# Patient Record
Sex: MALE | Race: WHITE | NOT HISPANIC OR LATINO | Employment: OTHER | URBAN - METROPOLITAN AREA
[De-identification: names, ages, dates, MRNs, and addresses within clinical notes are randomized per-mention and may not be internally consistent; named-entity substitution may affect disease eponyms.]

---

## 2017-08-03 ENCOUNTER — OFFICE VISIT (OUTPATIENT)
Dept: CARDIOLOGY | Facility: CLINIC | Age: 82
End: 2017-08-03

## 2017-08-03 VITALS
WEIGHT: 182 LBS | HEART RATE: 56 BPM | HEIGHT: 65 IN | SYSTOLIC BLOOD PRESSURE: 118 MMHG | DIASTOLIC BLOOD PRESSURE: 80 MMHG | OXYGEN SATURATION: 100 % | BODY MASS INDEX: 30.32 KG/M2

## 2017-08-03 DIAGNOSIS — Z95.5 HISTORY OF CORONARY ARTERY STENT PLACEMENT: ICD-10-CM

## 2017-08-03 DIAGNOSIS — I25.10 CORONARY ARTERY DISEASE INVOLVING NATIVE CORONARY ARTERY OF NATIVE HEART WITHOUT ANGINA PECTORIS: Primary | ICD-10-CM

## 2017-08-03 PROBLEM — E78.5 HLD (HYPERLIPIDEMIA): Status: ACTIVE | Noted: 2017-08-03

## 2017-08-03 PROBLEM — I71.9 AA (AORTIC ANEURYSM) (HCC): Status: ACTIVE | Noted: 2017-08-03

## 2017-08-03 PROCEDURE — 93000 ELECTROCARDIOGRAM COMPLETE: CPT | Performed by: PHYSICIAN ASSISTANT

## 2017-08-03 PROCEDURE — 99213 OFFICE O/P EST LOW 20 MIN: CPT | Performed by: PHYSICIAN ASSISTANT

## 2017-08-03 RX ORDER — LANOLIN ALCOHOL/MO/W.PET/CERES
1000 CREAM (GRAM) TOPICAL DAILY
COMMUNITY
End: 2020-01-01 | Stop reason: HOSPADM

## 2017-08-03 NOTE — PROGRESS NOTES
Date of Office Visit: 2017  Encounter Provider: AVTAR Bojorquez  Place of Service: [facility] CARDIOLOGY  Patient Name: Nicholas Lino  :1928    Chief Complaint   Patient presents with   • Coronary Artery Disease     1 year follow up   :     HPI: Nicholas Lino is a 88 y.o. male , new to me, who presents today for follow-up.  Old records have been obtained and reviewed by me.  He is a patient of Dr. Stewart's with a past medical history significant for hypertension, hyperlipidemia, and coronary artery disease.  In  he had 3 bare metal stents placed in his proximal LAD.  In  he came back with recurrent angina, and it was found that his first diagonal was severely compromised.  He underwent bare metal stent placement to this vessel as well.  At that time, his circumflex and RCA were free of disease and he had normal LV function.  He was last in our office to see Dr. Stewart on 2016.  At that visit, he was doing well without complaints of chest pain or heart failure.   Over the past year he's been doing great.  He really feels fantastic.  He walks on a treadmill 6 days a week, and on his off day he plays golf.  He is able to do all this without difficulty.  He still teaches watercolor classes regularly as well.  He denies any chest pain, shortness of breath, palpitations, edema, dizziness, or syncope.      Past Medical History:   Diagnosis Date   • Aortic aneurysm    • CAD (coronary artery disease)    • Hyperlipidemia    • Hypertension        Past Surgical History:   Procedure Laterality Date   • APPENDECTOMY     • ARTERIAL ANEURYSM REPAIR     • CARDIAC CATHETERIZATION     • CORONARY ANGIOPLASTY WITH STENT PLACEMENT     • COSMETIC SURGERY     • FEMORAL ARTERY REPAIR     • MOLE REMOVAL         Social History     Social History   • Marital status:      Spouse name: N/A   • Number of children: N/A   • Years of education: N/A     Occupational History   •  Not on file.     Social History Main Topics   • Smoking status: Never Smoker   • Smokeless tobacco: Not on file   • Alcohol use No   • Drug use: No   • Sexual activity: Defer     Other Topics Concern   • Not on file     Social History Narrative       Family History   Problem Relation Age of Onset   • Heart disease Father    • Stroke Father    • No Known Problems Mother    • No Known Problems Maternal Grandmother    • No Known Problems Maternal Grandfather    • No Known Problems Paternal Grandmother    • No Known Problems Paternal Grandfather        Review of Systems   Constitution: Negative for chills, fever, malaise/fatigue, weight gain and weight loss.   HENT: Negative for ear pain, headaches, hearing loss, nosebleeds and sore throat.    Eyes: Negative for double vision, pain and visual disturbance.   Cardiovascular: Negative for chest pain, dyspnea on exertion, irregular heartbeat, leg swelling, near-syncope, orthopnea, palpitations, paroxysmal nocturnal dyspnea and syncope.   Respiratory: Negative for cough, shortness of breath, sleep disturbances due to breathing, snoring and wheezing.    Endocrine: Negative for cold intolerance, heat intolerance and polyuria.   Skin: Negative for itching and rash.   Musculoskeletal: Negative for joint pain, joint swelling and myalgias.   Gastrointestinal: Negative for abdominal pain, diarrhea, melena, nausea and vomiting.   Genitourinary: Negative for frequency, hematuria and hesitancy.   Neurological: Negative for excessive daytime sleepiness, light-headedness, numbness, paresthesias and seizures.   Psychiatric/Behavioral: Negative for altered mental status and depression.   Allergic/Immunologic: Negative.    All other systems reviewed and are negative.      Allergies   Allergen Reactions   • Clindamycin/Lincomycin    • Keflex [Cephalexin]          Current Outpatient Prescriptions:   •  aspirin 81 MG chewable tablet, Chew 81 mg daily., Disp: , Rfl:   •  calcium  "citrate-vitamin d (CITRACAL) 200-250 MG-UNIT tablet tablet, Take 1 tablet by mouth Daily., Disp: , Rfl:   •  hydrochlorothiazide (MICROZIDE) 12.5 MG capsule, TAKE ONE CAPSULE BY MOUTH DAILY, Disp: 90 capsule, Rfl: 3  •  lisinopril (PRINIVIL,ZESTRIL) 40 MG tablet, Take 40 mg by mouth daily., Disp: , Rfl:   •  metoprolol tartrate (LOPRESSOR) 25 MG tablet, Take 25 mg by mouth 2 (two) times a day., Disp: , Rfl:   •  MULTIPLE VITAMINS PO, Take 1 tablet by mouth Daily., Disp: , Rfl:   •  Potassium 99 MG tablet, Take 1 tablet by mouth Daily., Disp: , Rfl:   •  simvastatin (ZOCOR) 80 MG tablet, Take 80 mg by mouth every night., Disp: , Rfl:   •  vitamin B-12 (CYANOCOBALAMIN) 1000 MCG tablet, Take 1,000 mcg by mouth Daily., Disp: , Rfl:   •  vitamin C (ASCORBIC ACID) 250 MG tablet, Take 1,000 mg by mouth daily., Disp: , Rfl:      Objective:     Vitals:    08/03/17 1030 08/03/17 1042   BP: 110/70 118/80   BP Location: Right arm Left arm   Pulse: 56    SpO2: 100%    Weight: 182 lb (82.6 kg)    Height: 65\" (165.1 cm)      Body mass index is 30.29 kg/(m^2).    PHYSICAL EXAM:    Physical Exam   Constitutional: He is oriented to person, place, and time. He appears well-developed and well-nourished. No distress.   HENT:   Head: Normocephalic and atraumatic.   Eyes: Pupils are equal, round, and reactive to light.   Neck: No JVD present. No thyromegaly present.   Cardiovascular: Normal rate, regular rhythm, normal heart sounds and intact distal pulses.    No murmur heard.  Pulmonary/Chest: Effort normal and breath sounds normal. No respiratory distress.   Abdominal: Soft. Bowel sounds are normal. He exhibits no distension. There is no splenomegaly or hepatomegaly. There is no tenderness.   Musculoskeletal: Normal range of motion. He exhibits no edema.   Neurological: He is alert and oriented to person, place, and time.   Skin: Skin is warm and dry. He is not diaphoretic. No erythema.   Psychiatric: He has a normal mood and affect. " His behavior is normal. Judgment normal.         ECG 12 Lead  Date/Time: 8/3/2017 10:49 AM  Performed by: OVIDIO RASHEED.  Authorized by: OVIDIO RASHEED   Comparison: compared with previous ECG from 7/25/2016  Similar to previous ECG  Rhythm: sinus rhythm  BPM: 56  Conduction: 1st degree  Q waves: III, aVR and V1  Clinical impression: abnormal ECG  Comments: Indication: Coronary artery disease, status post stent placement.              Assessment:       Diagnosis Plan   1. Coronary artery disease involving native coronary artery of native heart without angina pectoris  ECG 12 Lead   2. History of coronary artery stent placement  ECG 12 Lead     Orders Placed This Encounter   Procedures   • ECG 12 Lead     This order was created via procedure documentation          Plan:       1.  Coronary Artery Disease  Assessment  • The patient has no angina    Plan  • Lifestyle modifications discussed include adhering to a heart healthy diet and regular exercise    Subjective - Objective  • There has been a previous stent procedure using BMS  • Current antiplatelet therapy includes aspirin 81 mg  • He is doing really fantastic.  This is a man who is 88 years old and is truly enjoying life and living it to the fullest.  I'm not going to make any changes to his medical regimen today.  He will follow-up with Dr. Stewart in 1 year or sooner if needed.      As always, it has been a pleasure to participate in your patient's care.      Sincerely,         Ovidio Rasheed PA-C

## 2017-08-14 ENCOUNTER — TRANSCRIBE ORDERS (OUTPATIENT)
Dept: CARDIOLOGY | Facility: HOSPITAL | Age: 82
End: 2017-08-14

## 2017-08-14 ENCOUNTER — HOSPITAL ENCOUNTER (OUTPATIENT)
Dept: GENERAL RADIOLOGY | Facility: HOSPITAL | Age: 82
Discharge: HOME | End: 2017-08-14
Attending: SURGERY | Admitting: SURGERY

## 2017-08-14 DIAGNOSIS — Z95.1 S/P 2-VESSEL CORONARY ARTERY BYPASS: Primary | ICD-10-CM

## 2017-08-14 DIAGNOSIS — Z95.1 S/P 2-VESSEL CORONARY ARTERY BYPASS: ICD-10-CM

## 2017-08-14 DIAGNOSIS — I71.40 ABDOMINAL AORTIC ANEURYSM (AAA) WITHOUT RUPTURE (HCC): ICD-10-CM

## 2017-08-14 PROCEDURE — 74010 HC ABDOMEN SERIES FOR ANEURYSM: CPT

## 2017-08-21 RX ORDER — HYDROCHLOROTHIAZIDE 12.5 MG/1
CAPSULE, GELATIN COATED ORAL
Qty: 90 CAPSULE | Refills: 2 | Status: SHIPPED | OUTPATIENT
Start: 2017-08-21 | End: 2018-05-15 | Stop reason: SDUPTHER

## 2018-05-15 RX ORDER — HYDROCHLOROTHIAZIDE 12.5 MG/1
12.5 CAPSULE, GELATIN COATED ORAL DAILY
Qty: 90 CAPSULE | Refills: 0 | Status: SHIPPED | OUTPATIENT
Start: 2018-05-15 | End: 2018-08-11 | Stop reason: SDUPTHER

## 2018-08-06 ENCOUNTER — OFFICE VISIT (OUTPATIENT)
Dept: CARDIOLOGY | Facility: CLINIC | Age: 83
End: 2018-08-06

## 2018-08-06 VITALS
HEART RATE: 56 BPM | SYSTOLIC BLOOD PRESSURE: 110 MMHG | BODY MASS INDEX: 29.99 KG/M2 | DIASTOLIC BLOOD PRESSURE: 62 MMHG | WEIGHT: 180 LBS | HEIGHT: 65 IN

## 2018-08-06 DIAGNOSIS — E78.49 OTHER HYPERLIPIDEMIA: ICD-10-CM

## 2018-08-06 DIAGNOSIS — Z95.5 HISTORY OF CORONARY ARTERY STENT PLACEMENT: ICD-10-CM

## 2018-08-06 DIAGNOSIS — I25.10 CORONARY ARTERY DISEASE INVOLVING NATIVE CORONARY ARTERY OF NATIVE HEART WITHOUT ANGINA PECTORIS: Primary | ICD-10-CM

## 2018-08-06 PROCEDURE — 99214 OFFICE O/P EST MOD 30 MIN: CPT | Performed by: INTERNAL MEDICINE

## 2018-08-06 PROCEDURE — 93000 ELECTROCARDIOGRAM COMPLETE: CPT | Performed by: INTERNAL MEDICINE

## 2018-08-06 NOTE — PROGRESS NOTES
Date of Office Visit: 2018  Encounter Provider: Kieran Stewart MD  Place of Service: [facility] CARDIOLOGY  Patient Name: Nicholas Lino  :1928  [id number]    CC:CAD    HPI: Nicholas Lino is a 89 y.o. male  He is here for follow-up.  He is a suleman that I have only done this to one time before.  I put three bare-metal stents in his proximal left anterior descending and into his first diagonal in  and , so he has a bare-metal stent bifurcation stent and he has done fantastic ever since then.  He has never had any more problems since then.  He had a normal circumflex and right coronary artery and normal left ventricular function.  He is a watercolor .  He still plays golf and he walks 20 minutes every day on a treadmill, all without limitation and he is doing well.         Past Medical History:   Diagnosis Date   • Aortic aneurysm (CMS/HCC)    • CAD (coronary artery disease)    • Hyperlipidemia    • Hypertension        Past Surgical History:   Procedure Laterality Date   • APPENDECTOMY     • ARTERIAL ANEURYSM REPAIR     • CARDIAC CATHETERIZATION     • CORONARY ANGIOPLASTY WITH STENT PLACEMENT     • COSMETIC SURGERY     • FEMORAL ARTERY REPAIR     • MOLE REMOVAL         Social History     Social History   • Marital status:      Spouse name: N/A   • Number of children: N/A   • Years of education: N/A     Occupational History   • Not on file.     Social History Main Topics   • Smoking status: Never Smoker   • Smokeless tobacco: Not on file   • Alcohol use No   • Drug use: No   • Sexual activity: Defer     Other Topics Concern   • Not on file     Social History Narrative   • No narrative on file       Family History   Problem Relation Age of Onset   • Heart disease Father    • Stroke Father    • No Known Problems Mother    • No Known Problems Maternal Grandmother    • No Known Problems Maternal Grandfather    • No Known Problems Paternal Grandmother    • No  Known Problems Paternal Grandfather        Review of Systems   Constitution: Negative for decreased appetite, fever, malaise/fatigue and weight loss.   HENT: Negative for nosebleeds.    Eyes: Negative for double vision.   Cardiovascular: Negative for chest pain, claudication, cyanosis, dyspnea on exertion, irregular heartbeat, leg swelling, near-syncope, orthopnea, palpitations, paroxysmal nocturnal dyspnea and syncope.   Respiratory: Negative for cough, hemoptysis and shortness of breath.    Hematologic/Lymphatic: Negative for bleeding problem.   Skin: Negative for rash.   Musculoskeletal: Negative for falls and myalgias.   Gastrointestinal: Negative for hematochezia, jaundice, melena, nausea and vomiting.   Genitourinary: Negative for hematuria.   Neurological: Negative for dizziness and seizures.   Psychiatric/Behavioral: Negative for altered mental status and memory loss.       Allergies   Allergen Reactions   • Clindamycin/Lincomycin    • Keflex [Cephalexin]          Current Outpatient Prescriptions:   •  aspirin 81 MG chewable tablet, Chew 81 mg daily., Disp: , Rfl:   •  calcium citrate-vitamin d (CITRACAL) 200-250 MG-UNIT tablet tablet, Take 1 tablet by mouth Daily., Disp: , Rfl:   •  hydrochlorothiazide (MICROZIDE) 12.5 MG capsule, Take 1 capsule by mouth Daily., Disp: 90 capsule, Rfl: 0  •  lisinopril (PRINIVIL,ZESTRIL) 40 MG tablet, Take 40 mg by mouth daily., Disp: , Rfl:   •  metoprolol tartrate (LOPRESSOR) 25 MG tablet, Take 25 mg by mouth 2 (two) times a day., Disp: , Rfl:   •  MULTIPLE VITAMINS PO, Take 1 tablet by mouth Daily., Disp: , Rfl:   •  Potassium 99 MG tablet, Take 1 tablet by mouth Daily., Disp: , Rfl:   •  simvastatin (ZOCOR) 80 MG tablet, Take 80 mg by mouth every night., Disp: , Rfl:   •  vitamin B-12 (CYANOCOBALAMIN) 1000 MCG tablet, Take 1,000 mcg by mouth Daily., Disp: , Rfl:   •  vitamin C (ASCORBIC ACID) 250 MG tablet, Take 1,000 mg by mouth daily., Disp: , Rfl:       Objective:  "    Vitals:    08/06/18 1258   BP: 110/62   Pulse: 56   Weight: 81.6 kg (180 lb)   Height: 165.1 cm (65\")     Body mass index is 29.95 kg/m².    Physical Exam   Constitutional: He is oriented to person, place, and time. He appears well-developed and well-nourished.   HENT:   Head: Normocephalic.   Eyes: No scleral icterus.   Neck: No JVD present. No thyromegaly present.   Cardiovascular: Normal rate, regular rhythm and normal heart sounds.  Exam reveals no gallop and no friction rub.    No murmur heard.  Pulmonary/Chest: Effort normal and breath sounds normal. He has no wheezes. He has no rales.   Abdominal: Soft. There is no hepatosplenomegaly. There is no tenderness.   Musculoskeletal: Normal range of motion. He exhibits no edema.   Lymphadenopathy:     He has no cervical adenopathy.   Neurological: He is alert and oriented to person, place, and time.   Skin: Skin is warm and dry. No rash noted.   Psychiatric: He has a normal mood and affect.         ECG 12 Lead  Date/Time: 8/6/2018 1:35 PM  Performed by: QIAN GUERRA  Authorized by: QIAN GUERRA   Comparison: compared with previous ECG   Similar to previous ECG  Rhythm: sinus bradycardia  Conduction: 1st degree  Clinical impression: normal ECG             Assessment:       Diagnosis Plan   1. Coronary artery disease involving native coronary artery of native heart without angina pectoris     2. History of coronary artery stent placement     3. Other hyperlipidemia            Plan:       He is doing great.  He has no symptoms.  Risk modification is fantastic.  He is 88, so this has been a win.  I am not going to make any changes.  I will have him come back and see us in a year or sooner if he has trouble.    Coronary Artery Disease  Assessment  • The patient has no angina    Plan  • Lifestyle modifications discussed include adhering to a heart healthy diet, maintenance of a healthy weight, medication compliance, regular exercise and regular monitoring of " cholesterol and blood pressure    Subjective - Objective  • There has been a previous stent procedure using BMS  • Current antiplatelet therapy includes aspirin 81 mg        As always, it has been a pleasure to participate in your patient's care.      Sincerely,       Kieran Stewart MD

## 2018-08-13 RX ORDER — HYDROCHLOROTHIAZIDE 12.5 MG/1
CAPSULE, GELATIN COATED ORAL
Qty: 90 CAPSULE | Refills: 2 | Status: SHIPPED | OUTPATIENT
Start: 2018-08-13 | End: 2019-01-01 | Stop reason: SDUPTHER

## 2019-01-01 ENCOUNTER — HOSPITAL ENCOUNTER (OUTPATIENT)
Dept: PHYSICAL THERAPY | Facility: HOSPITAL | Age: 84
Setting detail: THERAPIES SERIES
Discharge: HOME | End: 2019-10-03

## 2019-01-01 ENCOUNTER — TRANSCRIBE ORDERS (OUTPATIENT)
Dept: ADMINISTRATIVE | Facility: HOSPITAL | Age: 84
End: 2019-01-01

## 2019-01-01 ENCOUNTER — APPOINTMENT (OUTPATIENT)
Dept: PREADMISSION TESTING | Facility: HOSPITAL | Age: 84
End: 2019-01-01

## 2019-01-01 ENCOUNTER — TRANSCRIBE ORDERS (OUTPATIENT)
Dept: PHYSICAL THERAPY | Facility: HOSPITAL | Age: 84
End: 2019-01-01

## 2019-01-01 ENCOUNTER — HOSPITAL ENCOUNTER (OUTPATIENT)
Dept: PHYSICAL THERAPY | Facility: HOSPITAL | Age: 84
Setting detail: THERAPIES SERIES
Discharge: HOME | End: 2019-10-24

## 2019-01-01 ENCOUNTER — HOSPITAL ENCOUNTER (OUTPATIENT)
Dept: PHYSICAL THERAPY | Facility: HOSPITAL | Age: 84
Setting detail: THERAPIES SERIES
Discharge: HOME | End: 2019-10-29

## 2019-01-01 ENCOUNTER — HOSPITAL ENCOUNTER (OUTPATIENT)
Dept: CARDIOLOGY | Facility: HOSPITAL | Age: 84
Discharge: HOME | End: 2019-11-26
Admitting: INTERNAL MEDICINE

## 2019-01-01 ENCOUNTER — HOSPITAL ENCOUNTER (OUTPATIENT)
Dept: PHYSICAL THERAPY | Facility: HOSPITAL | Age: 84
Setting detail: THERAPIES SERIES
Discharge: HOME | End: 2019-10-01

## 2019-01-01 ENCOUNTER — OFFICE VISIT (OUTPATIENT)
Dept: CARDIOLOGY | Facility: CLINIC | Age: 84
End: 2019-01-01

## 2019-01-01 ENCOUNTER — HOSPITAL ENCOUNTER (OUTPATIENT)
Dept: PHYSICAL THERAPY | Facility: HOSPITAL | Age: 84
Setting detail: THERAPIES SERIES
Discharge: HOME | End: 2019-10-22

## 2019-01-01 ENCOUNTER — TELEPHONE (OUTPATIENT)
Dept: PHARMACY | Facility: HOSPITAL | Age: 84
End: 2019-01-01

## 2019-01-01 ENCOUNTER — HOSPITAL ENCOUNTER (OUTPATIENT)
Dept: PHYSICAL THERAPY | Facility: HOSPITAL | Age: 84
Setting detail: THERAPIES SERIES
Discharge: HOME | End: 2019-10-17

## 2019-01-01 ENCOUNTER — HOSPITAL ENCOUNTER (OUTPATIENT)
Dept: CARDIOLOGY | Facility: HOSPITAL | Age: 84
Discharge: HOME | End: 2019-08-19
Admitting: INTERNAL MEDICINE

## 2019-01-01 ENCOUNTER — HOSPITAL ENCOUNTER (OUTPATIENT)
Dept: PHYSICAL THERAPY | Facility: HOSPITAL | Age: 84
Setting detail: THERAPIES SERIES
Discharge: HOME | End: 2019-10-10

## 2019-01-01 ENCOUNTER — HOSPITAL ENCOUNTER (OUTPATIENT)
Dept: PHYSICAL THERAPY | Facility: HOSPITAL | Age: 84
Setting detail: THERAPIES SERIES
Discharge: HOME | End: 2019-10-08

## 2019-01-01 ENCOUNTER — HOSPITAL ENCOUNTER (OUTPATIENT)
Dept: GENERAL RADIOLOGY | Facility: HOSPITAL | Age: 84
Discharge: HOME | End: 2019-08-19

## 2019-01-01 ENCOUNTER — TELEPHONE (OUTPATIENT)
Dept: CARDIOLOGY | Facility: CLINIC | Age: 84
End: 2019-01-01

## 2019-01-01 ENCOUNTER — HOSPITAL ENCOUNTER (OUTPATIENT)
Dept: PHYSICAL THERAPY | Facility: HOSPITAL | Age: 84
Setting detail: THERAPIES SERIES
Discharge: HOME | End: 2019-09-19

## 2019-01-01 ENCOUNTER — OFFICE VISIT (OUTPATIENT)
Dept: ORTHOPEDIC SURGERY | Facility: CLINIC | Age: 84
End: 2019-01-01

## 2019-01-01 ENCOUNTER — HOSPITAL ENCOUNTER (OUTPATIENT)
Dept: GENERAL RADIOLOGY | Facility: HOSPITAL | Age: 84
Discharge: HOME | End: 2019-02-18
Admitting: SURGERY

## 2019-01-01 ENCOUNTER — HOSPITAL ENCOUNTER (OUTPATIENT)
Dept: PHYSICAL THERAPY | Facility: HOSPITAL | Age: 84
Setting detail: THERAPIES SERIES
Discharge: HOME | End: 2019-10-15

## 2019-01-01 VITALS
DIASTOLIC BLOOD PRESSURE: 70 MMHG | HEART RATE: 65 BPM | OXYGEN SATURATION: 95 % | SYSTOLIC BLOOD PRESSURE: 128 MMHG | BODY MASS INDEX: 28.45 KG/M2 | WEIGHT: 177 LBS | HEIGHT: 66 IN

## 2019-01-01 VITALS
WEIGHT: 175.2 LBS | BODY MASS INDEX: 28.16 KG/M2 | HEIGHT: 66 IN | DIASTOLIC BLOOD PRESSURE: 62 MMHG | TEMPERATURE: 98.6 F | SYSTOLIC BLOOD PRESSURE: 130 MMHG

## 2019-01-01 VITALS — HEIGHT: 68 IN | WEIGHT: 169 LBS | BODY MASS INDEX: 25.61 KG/M2

## 2019-01-01 VITALS
HEART RATE: 48 BPM | WEIGHT: 175 LBS | SYSTOLIC BLOOD PRESSURE: 146 MMHG | BODY MASS INDEX: 28.12 KG/M2 | HEIGHT: 66 IN | OXYGEN SATURATION: 100 % | TEMPERATURE: 97.6 F | DIASTOLIC BLOOD PRESSURE: 78 MMHG | RESPIRATION RATE: 20 BRPM

## 2019-01-01 VITALS — WEIGHT: 177.2 LBS | HEIGHT: 67 IN | BODY MASS INDEX: 27.81 KG/M2 | TEMPERATURE: 97.8 F

## 2019-01-01 DIAGNOSIS — Z86.79 STATUS POST AAA (ABDOMINAL AORTIC ANEURYSM) REPAIR: Primary | ICD-10-CM

## 2019-01-01 DIAGNOSIS — M79.661 PAIN AND SWELLING OF RIGHT LOWER LEG: Primary | ICD-10-CM

## 2019-01-01 DIAGNOSIS — M16.11 PRIMARY OSTEOARTHRITIS OF RIGHT HIP: ICD-10-CM

## 2019-01-01 DIAGNOSIS — M79.604 RIGHT LEG PAIN: ICD-10-CM

## 2019-01-01 DIAGNOSIS — M25.551 RIGHT HIP PAIN: Primary | ICD-10-CM

## 2019-01-01 DIAGNOSIS — I25.10 CORONARY ARTERY DISEASE INVOLVING NATIVE CORONARY ARTERY OF NATIVE HEART WITHOUT ANGINA PECTORIS: Primary | ICD-10-CM

## 2019-01-01 DIAGNOSIS — M16.11 PRIMARY OSTEOARTHRITIS OF RIGHT HIP: Primary | ICD-10-CM

## 2019-01-01 DIAGNOSIS — M79.89 PAIN AND SWELLING OF RIGHT LOWER LEG: ICD-10-CM

## 2019-01-01 DIAGNOSIS — I25.10 CHRONIC CORONARY ARTERY DISEASE: Primary | ICD-10-CM

## 2019-01-01 DIAGNOSIS — M79.89 PAIN AND SWELLING OF RIGHT LOWER LEG: Primary | ICD-10-CM

## 2019-01-01 DIAGNOSIS — Z98.890 STATUS POST AAA (ABDOMINAL AORTIC ANEURYSM) REPAIR: Primary | ICD-10-CM

## 2019-01-01 DIAGNOSIS — Z98.890 STATUS POST AAA (ABDOMINAL AORTIC ANEURYSM) REPAIR: ICD-10-CM

## 2019-01-01 DIAGNOSIS — M54.31 RIGHT SIDED SCIATICA: Primary | ICD-10-CM

## 2019-01-01 DIAGNOSIS — I10 ESSENTIAL HYPERTENSION: ICD-10-CM

## 2019-01-01 DIAGNOSIS — Z86.79 STATUS POST AAA (ABDOMINAL AORTIC ANEURYSM) REPAIR: ICD-10-CM

## 2019-01-01 DIAGNOSIS — I25.10 CHRONIC CORONARY ARTERY DISEASE: ICD-10-CM

## 2019-01-01 DIAGNOSIS — M79.661 PAIN AND SWELLING OF RIGHT LOWER LEG: ICD-10-CM

## 2019-01-01 LAB
ANION GAP SERPL CALCULATED.3IONS-SCNC: 10.9 MMOL/L (ref 5–15)
BACTERIA UR QL AUTO: ABNORMAL /HPF
BH CV LOWER VASCULAR LEFT COMMON FEMORAL AUGMENT: NORMAL
BH CV LOWER VASCULAR LEFT COMMON FEMORAL COMPETENT: NORMAL
BH CV LOWER VASCULAR LEFT COMMON FEMORAL COMPRESS: NORMAL
BH CV LOWER VASCULAR LEFT COMMON FEMORAL PHASIC: NORMAL
BH CV LOWER VASCULAR LEFT COMMON FEMORAL SPONT: NORMAL
BH CV LOWER VASCULAR RIGHT COMMON FEMORAL AUGMENT: NORMAL
BH CV LOWER VASCULAR RIGHT COMMON FEMORAL COMPETENT: NORMAL
BH CV LOWER VASCULAR RIGHT COMMON FEMORAL COMPRESS: NORMAL
BH CV LOWER VASCULAR RIGHT COMMON FEMORAL PHASIC: NORMAL
BH CV LOWER VASCULAR RIGHT COMMON FEMORAL SPONT: NORMAL
BH CV LOWER VASCULAR RIGHT DISTAL FEMORAL COMPRESS: NORMAL
BH CV LOWER VASCULAR RIGHT GASTRONEMIUS COMPRESS: NORMAL
BH CV LOWER VASCULAR RIGHT GREATER SAPH AK COMPRESS: NORMAL
BH CV LOWER VASCULAR RIGHT GREATER SAPH BK COMPRESS: NORMAL
BH CV LOWER VASCULAR RIGHT MID FEMORAL AUGMENT: NORMAL
BH CV LOWER VASCULAR RIGHT MID FEMORAL COMPETENT: NORMAL
BH CV LOWER VASCULAR RIGHT MID FEMORAL COMPRESS: NORMAL
BH CV LOWER VASCULAR RIGHT MID FEMORAL PHASIC: NORMAL
BH CV LOWER VASCULAR RIGHT MID FEMORAL SPONT: NORMAL
BH CV LOWER VASCULAR RIGHT PERONEAL COMPRESS: NORMAL
BH CV LOWER VASCULAR RIGHT POPLITEAL AUGMENT: NORMAL
BH CV LOWER VASCULAR RIGHT POPLITEAL COMPETENT: NORMAL
BH CV LOWER VASCULAR RIGHT POPLITEAL COMPRESS: NORMAL
BH CV LOWER VASCULAR RIGHT POPLITEAL PHASIC: NORMAL
BH CV LOWER VASCULAR RIGHT POPLITEAL SPONT: NORMAL
BH CV LOWER VASCULAR RIGHT POSTERIOR TIBIAL COMPRESS: NORMAL
BH CV LOWER VASCULAR RIGHT PROXIMAL FEMORAL COMPRESS: NORMAL
BH CV LOWER VASCULAR RIGHT SAPHENOFEMORAL JUNCTION COMPRESS: NORMAL
BH CV NUCLEAR PRIOR STUDY: 3
BH CV STRESS BP STAGE 1: NORMAL
BH CV STRESS COMMENTS STAGE 1: NORMAL
BH CV STRESS DOSE REGADENOSON STAGE 1: 0.4
BH CV STRESS DURATION MIN STAGE 1: 0
BH CV STRESS DURATION SEC STAGE 1: 10
BH CV STRESS HR STAGE 1: 62
BH CV STRESS PROTOCOL 1: NORMAL
BH CV STRESS RECOVERY BP: NORMAL MMHG
BH CV STRESS RECOVERY HR: 62 BPM
BH CV STRESS STAGE 1: 1
BILIRUB UR QL STRIP: NEGATIVE
BUN BLD-MCNC: 14 MG/DL (ref 8–23)
BUN/CREAT SERPL: 14.9 (ref 7–25)
CALCIUM SPEC-SCNC: 10.3 MG/DL (ref 8.2–9.6)
CHLORIDE SERPL-SCNC: 99 MMOL/L (ref 98–107)
CLARITY UR: CLEAR
CO2 SERPL-SCNC: 30.1 MMOL/L (ref 22–29)
COLOR UR: YELLOW
CREAT BLD-MCNC: 0.94 MG/DL (ref 0.76–1.27)
DEPRECATED RDW RBC AUTO: 43.3 FL (ref 37–54)
ERYTHROCYTE [DISTWIDTH] IN BLOOD BY AUTOMATED COUNT: 12.5 % (ref 12.3–15.4)
GFR SERPL CREATININE-BSD FRML MDRD: 75 ML/MIN/1.73
GLUCOSE BLD-MCNC: 73 MG/DL (ref 65–99)
GLUCOSE UR STRIP-MCNC: NEGATIVE MG/DL
HCT VFR BLD AUTO: 44.2 % (ref 37.5–51)
HGB BLD-MCNC: 14.8 G/DL (ref 13–17.7)
HGB UR QL STRIP.AUTO: NEGATIVE
HYALINE CASTS UR QL AUTO: ABNORMAL /LPF
KETONES UR QL STRIP: NEGATIVE
LEUKOCYTE ESTERASE UR QL STRIP.AUTO: ABNORMAL
LV EF NUC BP: 68 %
MAXIMAL PREDICTED HEART RATE: 129 BPM
MCH RBC QN AUTO: 31 PG (ref 26.6–33)
MCHC RBC AUTO-ENTMCNC: 33.5 G/DL (ref 31.5–35.7)
MCV RBC AUTO: 92.7 FL (ref 79–97)
NITRITE UR QL STRIP: NEGATIVE
PERCENT MAX PREDICTED HR: 48.06 %
PH UR STRIP.AUTO: 7 [PH] (ref 5–8)
PLATELET # BLD AUTO: 182 10*3/MM3 (ref 140–450)
PMV BLD AUTO: 10.9 FL (ref 6–12)
POTASSIUM BLD-SCNC: 4.6 MMOL/L (ref 3.5–5.2)
PROT UR QL STRIP: NEGATIVE
RBC # BLD AUTO: 4.77 10*6/MM3 (ref 4.14–5.8)
RBC # UR: ABNORMAL /HPF
REF LAB TEST METHOD: ABNORMAL
SODIUM BLD-SCNC: 140 MMOL/L (ref 136–145)
SP GR UR STRIP: 1.02 (ref 1–1.03)
SQUAMOUS #/AREA URNS HPF: ABNORMAL /HPF
STRESS BASELINE BP: NORMAL MMHG
STRESS BASELINE HR: 52 BPM
STRESS PERCENT HR: 57 %
STRESS POST EXERCISE DUR SEC: 10 SEC
STRESS POST PEAK BP: NORMAL MMHG
STRESS POST PEAK HR: 62 BPM
STRESS TARGET HR: 110 BPM
UROBILINOGEN UR QL STRIP: ABNORMAL
WBC NRBC COR # BLD: 8.18 10*3/MM3 (ref 3.4–10.8)
WBC UR QL AUTO: ABNORMAL /HPF

## 2019-01-01 PROCEDURE — 93016 CV STRESS TEST SUPVJ ONLY: CPT | Performed by: INTERNAL MEDICINE

## 2019-01-01 PROCEDURE — 97110 THERAPEUTIC EXERCISES: CPT | Performed by: PHYSICAL THERAPIST

## 2019-01-01 PROCEDURE — 97110 THERAPEUTIC EXERCISES: CPT

## 2019-01-01 PROCEDURE — 78452 HT MUSCLE IMAGE SPECT MULT: CPT

## 2019-01-01 PROCEDURE — 0 TECHNETIUM TETROFOSMIN KIT: Performed by: INTERNAL MEDICINE

## 2019-01-01 PROCEDURE — 97112 NEUROMUSCULAR REEDUCATION: CPT

## 2019-01-01 PROCEDURE — 72110 X-RAY EXAM L-2 SPINE 4/>VWS: CPT

## 2019-01-01 PROCEDURE — 99204 OFFICE O/P NEW MOD 45 MIN: CPT | Performed by: ORTHOPAEDIC SURGERY

## 2019-01-01 PROCEDURE — 73502 X-RAY EXAM HIP UNI 2-3 VIEWS: CPT | Performed by: NURSE PRACTITIONER

## 2019-01-01 PROCEDURE — 73502 X-RAY EXAM HIP UNI 2-3 VIEWS: CPT

## 2019-01-01 PROCEDURE — 93017 CV STRESS TEST TRACING ONLY: CPT

## 2019-01-01 PROCEDURE — 81001 URINALYSIS AUTO W/SCOPE: CPT | Performed by: ORTHOPAEDIC SURGERY

## 2019-01-01 PROCEDURE — 25010000002 REGADENOSON 0.4 MG/5ML SOLUTION: Performed by: INTERNAL MEDICINE

## 2019-01-01 PROCEDURE — S0260 H&P FOR SURGERY: HCPCS | Performed by: NURSE PRACTITIONER

## 2019-01-01 PROCEDURE — 93018 CV STRESS TEST I&R ONLY: CPT | Performed by: INTERNAL MEDICINE

## 2019-01-01 PROCEDURE — 97162 PT EVAL MOD COMPLEX 30 MIN: CPT | Performed by: PHYSICAL THERAPIST

## 2019-01-01 PROCEDURE — 99214 OFFICE O/P EST MOD 30 MIN: CPT | Performed by: NURSE PRACTITIONER

## 2019-01-01 PROCEDURE — 36415 COLL VENOUS BLD VENIPUNCTURE: CPT

## 2019-01-01 PROCEDURE — 80048 BASIC METABOLIC PNL TOTAL CA: CPT | Performed by: ORTHOPAEDIC SURGERY

## 2019-01-01 PROCEDURE — 93000 ELECTROCARDIOGRAM COMPLETE: CPT | Performed by: NURSE PRACTITIONER

## 2019-01-01 PROCEDURE — 85027 COMPLETE CBC AUTOMATED: CPT | Performed by: ORTHOPAEDIC SURGERY

## 2019-01-01 PROCEDURE — 73560 X-RAY EXAM OF KNEE 1 OR 2: CPT

## 2019-01-01 PROCEDURE — 78452 HT MUSCLE IMAGE SPECT MULT: CPT | Performed by: INTERNAL MEDICINE

## 2019-01-01 PROCEDURE — 74021 RADEX ABDOMEN 3+ VIEWS: CPT

## 2019-01-01 PROCEDURE — 93971 EXTREMITY STUDY: CPT

## 2019-01-01 PROCEDURE — A9502 TC99M TETROFOSMIN: HCPCS | Performed by: INTERNAL MEDICINE

## 2019-01-01 RX ORDER — HYDROCODONE BITARTRATE AND ACETAMINOPHEN 5; 325 MG/1; MG/1
1 TABLET ORAL EVERY 6 HOURS PRN
COMMUNITY
Start: 2019-01-01 | End: 2019-01-01

## 2019-01-01 RX ORDER — CHLORHEXIDINE GLUCONATE 500 MG/1
1 CLOTH TOPICAL
COMMUNITY
End: 2020-01-01 | Stop reason: HOSPADM

## 2019-01-01 RX ORDER — HYDROCHLOROTHIAZIDE 12.5 MG/1
CAPSULE, GELATIN COATED ORAL
Qty: 90 CAPSULE | Refills: 1 | Status: SHIPPED | OUTPATIENT
Start: 2019-01-01 | End: 2019-01-01 | Stop reason: SDUPTHER

## 2019-01-01 RX ORDER — ACETAMINOPHEN 500 MG
1000 TABLET ORAL EVERY 6 HOURS PRN
COMMUNITY
End: 2020-01-01 | Stop reason: HOSPADM

## 2019-01-01 RX ORDER — MELOXICAM 15 MG/1
15 TABLET ORAL ONCE
Status: CANCELLED | OUTPATIENT
Start: 2020-01-01 | End: 2019-01-01

## 2019-01-01 RX ORDER — HYDROCHLOROTHIAZIDE 12.5 MG/1
12.5 CAPSULE, GELATIN COATED ORAL DAILY
Qty: 90 CAPSULE | Refills: 2 | Status: SHIPPED | OUTPATIENT
Start: 2019-01-01

## 2019-01-01 RX ORDER — PREGABALIN 75 MG/1
150 CAPSULE ORAL ONCE
Status: CANCELLED | OUTPATIENT
Start: 2020-01-01 | End: 2019-01-01

## 2019-01-01 RX ORDER — LORATADINE 10 MG/1
10 CAPSULE, LIQUID FILLED ORAL DAILY
COMMUNITY

## 2019-01-01 RX ORDER — MULTIVIT WITH MINERALS/LUTEIN
1000 TABLET ORAL DAILY
COMMUNITY
End: 2020-01-01 | Stop reason: HOSPADM

## 2019-01-01 RX ADMIN — TETROFOSMIN 1 DOSE: 1.38 INJECTION, POWDER, LYOPHILIZED, FOR SOLUTION INTRAVENOUS at 09:39

## 2019-01-01 RX ADMIN — TETROFOSMIN 1 DOSE: 1.38 INJECTION, POWDER, LYOPHILIZED, FOR SOLUTION INTRAVENOUS at 08:40

## 2019-01-01 RX ADMIN — REGADENOSON 0.4 MG: 0.08 INJECTION, SOLUTION INTRAVENOUS at 09:39

## 2019-01-01 ASSESSMENT — HOOS JR
HOOS JR SCORE: 67.516
HOOS JR SCORE: 7

## 2019-07-30 PROBLEM — I10 HYPERTENSION: Status: ACTIVE | Noted: 2019-01-01

## 2019-07-30 NOTE — PROGRESS NOTES
Date of Office Visit: 2019  Encounter Provider: KATIE Hill  Place of Service: [facility] CARDIOLOGY  Patient Name: Nicholas Lino  :1928    Chief Complaint   Patient presents with   • Coronary Artery Disease     1 Yr. Follow Up   :     HPI: Nicholas Lino is a 89 y.o. male, new to me, who presents today for follow-up.  Old records have been obtained and reviewed by me.  He is a patient of Dr. Stewart's with a past cardiac history significant for hypertension, hyperlipidemia, and coronary artery disease.  In , he had 3 bare-metal stents placed to his proximal LAD.  In , he came back with recurrent angina and it was found that his first diagonal was severely compromised.  He underwent bare-metal stent placement to this vessel as well.  At that time, his circumflex and RCA were free of disease and he had normal LV function.  He was last seen in the office on 2018 at which time he was doing well with no complaints of angina or heart failure.  No changes were made to his medical regimen and he was advised to follow-up in 1 year.   Over the past year he has been doing well from a cardiac standpoint.  He denies any chest pain, shortness of air, palpitations, dizziness, or syncope.  He has been having some bilateral lower extremity edema that is worse on the right than the left.  He states that this is worse in the evenings and better when he wakes up in the morning.  He has been having some trouble with his right hip and he attributes the swelling up to this.  He is an avid golfer and golfs every Wednesday.  He walks on the treadmill for 20 minutes a day without any difficulties.  He and his wife cook most of their meals at home and are careful about the sodium.    Past Medical History:   Diagnosis Date   • Aortic aneurysm (CMS/HCC)    • CAD (coronary artery disease)    • Hyperlipidemia    • Hypertension        Past Surgical History:   Procedure Laterality  Date   • APPENDECTOMY     • ARTERIAL ANEURYSM REPAIR     • CARDIAC CATHETERIZATION     • CORONARY ANGIOPLASTY WITH STENT PLACEMENT     • COSMETIC SURGERY     • FEMORAL ARTERY REPAIR     • MOLE REMOVAL         Social History     Socioeconomic History   • Marital status:      Spouse name: Not on file   • Number of children: Not on file   • Years of education: Not on file   • Highest education level: Not on file   Tobacco Use   • Smoking status: Never Smoker   Substance and Sexual Activity   • Alcohol use: No   • Drug use: No   • Sexual activity: Defer       Family History   Problem Relation Age of Onset   • Heart disease Father    • Stroke Father    • No Known Problems Mother    • No Known Problems Maternal Grandmother    • No Known Problems Maternal Grandfather    • No Known Problems Paternal Grandmother    • No Known Problems Paternal Grandfather        Review of Systems   Constitution: Negative for chills, fever and malaise/fatigue.   Cardiovascular: Positive for leg swelling. Negative for chest pain, dyspnea on exertion, near-syncope, orthopnea, palpitations, paroxysmal nocturnal dyspnea and syncope.        R > L   Respiratory: Negative for cough and shortness of breath.    Musculoskeletal: Negative for joint pain, joint swelling and myalgias.   Gastrointestinal: Negative for abdominal pain, diarrhea, melena, nausea and vomiting.   Genitourinary: Negative for frequency and hematuria.   Neurological: Negative for light-headedness, numbness, paresthesias and seizures.   Allergic/Immunologic: Negative.    All other systems reviewed and are negative.      Allergies   Allergen Reactions   • Clindamycin/Lincomycin    • Keflex [Cephalexin]          Current Outpatient Medications:   •  aspirin 81 MG chewable tablet, Chew 81 mg daily., Disp: , Rfl:   •  calcium citrate-vitamin d (CITRACAL) 200-250 MG-UNIT tablet tablet, Take 1 tablet by mouth Daily., Disp: , Rfl:   •  hydrochlorothiazide (MICROZIDE) 12.5 MG capsule,  "TAKE ONE CAPSULE BY MOUTH DAILY, Disp: 90 capsule, Rfl: 1  •  HYDROcodone-acetaminophen (NORCO) 5-325 MG per tablet, Take 1 tablet by mouth Every 6 (Six) Hours As Needed., Disp: , Rfl:   •  lisinopril (PRINIVIL,ZESTRIL) 40 MG tablet, Take 40 mg by mouth daily., Disp: , Rfl:   •  Loratadine 10 MG capsule, Take  by mouth., Disp: , Rfl:   •  metoprolol tartrate (LOPRESSOR) 25 MG tablet, Take 25 mg by mouth 2 (two) times a day., Disp: , Rfl:   •  MULTIPLE VITAMINS PO, Take 1 tablet by mouth Daily., Disp: , Rfl:   •  Potassium 99 MG tablet, Take 1 tablet by mouth Daily., Disp: , Rfl:   •  simvastatin (ZOCOR) 80 MG tablet, Take 80 mg by mouth every night., Disp: , Rfl:   •  vitamin B-12 (CYANOCOBALAMIN) 1000 MCG tablet, Take 1,000 mcg by mouth Daily., Disp: , Rfl:   •  vitamin C (ASCORBIC ACID) 250 MG tablet, Take 1,000 mg by mouth daily., Disp: , Rfl:       Objective:     Vitals:    07/30/19 1306 07/30/19 1307   BP: 124/62 128/70   BP Location: Right arm Left arm   Patient Position: Sitting Sitting   Pulse: 65    SpO2: 95%    Weight: 80.3 kg (177 lb)    Height: 167.6 cm (66\")      Body mass index is 28.57 kg/m².    PHYSICAL EXAM:    Physical Exam   Constitutional: He is oriented to person, place, and time. He appears well-developed and well-nourished. No distress.   HENT:   Head: Normocephalic and atraumatic.   Eyes: Pupils are equal, round, and reactive to light.   Neck: No JVD present. No thyromegaly present.   Cardiovascular: Normal rate, regular rhythm, normal heart sounds and intact distal pulses.   No murmur heard.  Pulmonary/Chest: Effort normal and breath sounds normal. No respiratory distress.   Abdominal: Soft. Bowel sounds are normal. He exhibits no distension. There is no splenomegaly or hepatomegaly. There is no tenderness.   Musculoskeletal: Normal range of motion. He exhibits edema.   BLE R +1 pitting L +2   Neurological: He is alert and oriented to person, place, and time.   Skin: Skin is warm and dry. He " is not diaphoretic. No erythema.   Psychiatric: He has a normal mood and affect. His behavior is normal. Judgment normal.         ECG 12 Lead  Date/Time: 7/30/2019 1:13 PM  Performed by: Priya Stark APRN  Authorized by: Priya Stark APRN   Comparison: compared with previous ECG from 8/6/2018  Similar to previous ECG  Rhythm: sinus rhythm  Rate: normal  BPM: 61  Conduction: 1st degree AV block  T inversion: V1    Clinical impression: normal ECG  Comments: Indication: CAD              Assessment:       Diagnosis Plan   1. Coronary artery disease involving native coronary artery of native heart without angina pectoris  ECG 12 Lead   2. Essential hypertension       Orders Placed This Encounter   Procedures   • ECG 12 Lead     This order was created via procedure documentation          Plan:       1. Coronary Artery Disease  Assessment  • The patient has no angina    Plan  • Lifestyle modifications discussed include adhering to a heart healthy diet, medication compliance, regular exercise and regular monitoring of cholesterol and blood pressure    Subjective - Objective  • There has been a previous stent procedure using BMS  • Current antiplatelet therapy includes aspirin 81 mg      2. Hypertension.  Blood pressure looks good today.  Continue current regimen.      Overall think he is stable from a cardiac standpoint.  He denies any symptoms of angina or heart failure.  I think his lower extremity swelling is likely due to venous insufficiency.  I encouraged him to be mindful of his sodium intake, keep his feet elevated when possible, and wear compression stockings.  Not going to make any changes to his medical regimen and he will follow-up with Dr. Stewart in 1 year or sooner if needed.      As always, it has been a pleasure to participate in your patient's care.      Sincerely,         KATIE Paris

## 2019-09-19 NOTE — THERAPY EVALUATION
Outpatient Physical Therapy Ortho Initial Evaluation  [facility]     Patient Name: Nicholas Lino  : 1928  MRN: [id number]  Today's Date: 2019      Visit Date: 2019    Patient Active Problem List   Diagnosis   • Coronary artery disease involving native coronary artery of native heart without angina pectoris   • AA (aortic aneurysm) (CMS/HCC)   • HLD (hyperlipidemia)   • History of coronary artery stent placement   • Hypertension        Past Medical History:   Diagnosis Date   • Aortic aneurysm (CMS/HCC)    • CAD (coronary artery disease)    • Hyperlipidemia    • Hypertension         Past Surgical History:   Procedure Laterality Date   • APPENDECTOMY     • ARTERIAL ANEURYSM REPAIR     • CARDIAC CATHETERIZATION     • CORONARY ANGIOPLASTY WITH STENT PLACEMENT     • COSMETIC SURGERY     • FEMORAL ARTERY REPAIR     • MOLE REMOVAL         Visit Dx:     ICD-10-CM ICD-9-CM   1. Right hip pain M25.551 719.45         Patient History     Row Name 19 1600             History    Chief Complaint  Difficulty Walking;Difficulty with daily activities;Joint stiffness;Pain  -GJ      Type of Pain  Hip pain R  -GJ      Date Current Problem(s) Began  -- 3-4 months  -GJ      Brief Description of Current Complaint  Mr. Lino is a 89 y/o male.  He reports an apparent insidious onset of R hip pain from posterior lateral hip to anterior thigh to knee.  He had been an avid golfer, once a week, and is now unable to do so.  Aggravating activities include inability to don/doff socks on R, lift leg/move R leg, walking.   Denies red flags, changes in bowel/bladder. Does report intermittent sensation of numbness lateral R knee.  Denies groin pain.  He has been using a cane for a couple of weeks now.  Imaging studies suggest severe R hip joint degeneration, moderate to severe L spine joint space narrowing.    -GJ      Previous treatment for THIS PROBLEM  -- nothing to date  -GJ      Patient/Caregiver Goals  Relieve  pain;Return to prior level of function;Improve mobility;Improve strength;Know what to do to help the symptoms  -GJ      Occupation/sports/leisure activities  golf, painting  -GJ      What clinical tests have you had for this problem?  X-ray  -GJ      Results of Clinical Tests  see epic  -GJ         Pain     Pain Location  Hip R  -GJ      Pain at Present  1  -GJ      Pain at Best  0  -GJ      Pain at Worst  4  -GJ      What Performance Factors Make the Current Problem(s) WORSE?  donning/doffing socks, walking, lifting R leg, change of positios  -GJ         Fall Risk Assessment    Any falls in the past year:  No  -GJ         Daily Activities    Primary Language  English  -GJ      Teaching needs identified  Home Exercise Program;Management of Condition  -GJ      Patient is concerned about/has problems with  Climbing Stairs;Flexibility;Performing home management (household chores, shopping, care of dependents);Performing sports, recreation, and play activities;Sitting;Standing;Transfers (getting out of a chair, bed);Walking  -GJ      Barriers to learning  Hearing  -GJ      Pt Participated in POC and Goals  Yes  -GJ         Safety    Are you being hurt, hit, or frightened by anyone at home or in your life?  No  -GJ      Are you being neglected by a caregiver  No  -GJ        User Key  (r) = Recorded By, (t) = Taken By, (c) = Cosigned By    Initials Name Provider Type    Yosvnay Lindsey PT Physical Therapist          PT Ortho     Row Name 09/19/19 1600       Posture/Observations    Posture/Observations Comments  forward flexed posture,   -GJ       Quarter Clearing    Quarter Clearing  Lower Quarter Clearing  -GJ       DTR- Lower Quarter Clearing    Patellar tendon (L2-4)  Right:;1- Minimal response;Left:;2- Normal response  -GJ    Achilles tendon (S1-2)  Left:;2- Normal response;Right:;1- Minimal response  -GJ       Neural Tension Signs- Lower Quarter Clearing    Slump  Bilateral:;Negative  -GJ    SLR   Bilateral:;Negative  -GJ       Myotomal Screen- Lower Quarter Clearing    Hip flexion (L2)  Left:;4+ (Good +);Right:;4- (Good -)  -GJ    Knee extension (L3)  Bilateral:;4+ (Good +)  -GJ    Ankle DF (L4)  Bilateral:;4+ (Good +)  -GJ    Knee flexion (S2)  Bilateral:;4+ (Good +)  -GJ       SI/Hip Screen- Lower Quarter Clearing    Baylee's/Te's test  Right:;Positive  -GJ       Special Tests/Palpation    Special Tests/Palpation  Lumbar/SI;Hip  -GJ       Lumbar/SI Special Tests    SLR (Neural Tension)  Bilateral:;Negative  -GJ    Thigh Thrust/Posterior Shear (SI Dysfunction)  Bilateral:;Negative  -GJ    BAYLEE (hip vs. SI Dysfunction)  Right:;Positive  -GJ       Hip/Thigh Palpation    Hip/Thigh Palpation?  Yes  -GJ    Gluteus Medius  Right:;Tender;Guarded/taut  -GJ    Gluteus Minimus  Right:;Tender;Guarded/taut  -GJ    Piriformis  Right:;Tender;Guarded/taut  -GJ       Hip Special Tests    Hip scour test (labral vs hip pathology)  Right:;Positive  -GJ    Stinchfield test (hip vs back pathology)  Right:;Positive  -GJ       General ROM    GENERAL ROM COMMENTS  limited R hip PROM danyell in IR, flexion  -GJ       MMT (Manual Muscle Testing)    Rt Lower Ext  Rt Hip ABduction;Rt Hip Extension  -GJ    Lt Lower Ext  Lt Hip ABduction;Lt Hip Extension  -GJ       MMT Right Lower Ext    Rt Hip Extension MMT, Gross Movement  (3-/5) fair minus  -GJ    Rt Hip ABduction MMT, Gross Movement  (3-/5) fair minus  -GJ       MMT Left Lower Ext    Lt Hip Extension MMT, Gross Movement  (4/5) good  -GJ    Lt Hip ABduction MMT, Gross Movement  (4/5) good  -GJ       Flexibility    Flexibility Tested?  Lower Extremity  -GJ       Lower Extremity Flexibility    Hamstrings  Bilateral:;Moderately limited;Severely limited  -GJ    Hip Flexors  Bilateral:;Moderately limited;Severely limited  -GJ    Quadriceps  Bilateral:;Moderately limited;Severely limited  -GJ    Hip External Rotators  Bilateral:;Moderately limited;Severely limited  -GJ    Hip Internal  Rotators  Bilateral:;Moderately limited;Severely limited  -GJ      User Key  (r) = Recorded By, (t) = Taken By, (c) = Cosigned By    Initials Name Provider Type    Yosvany Lindsey, PT Physical Therapist                      Therapy Education  Education Details: discussed dx, px, poc, discussed anatomy of the hip and physiology of healing, discussed realistic expectations and time frames for therapy.  adjusted cane, encouraged use of cane, demonstrated appropriate gait pattern with cane, discussed activity modifications, encoruaged use of recumbent  bike, possibly joinging gym with silver sneakers.    Given: HEP, Symptoms/condition management, Pain management, Mobility training, Fall prevention and home safety, Posture/body mechanics  Program: New, Reinforced, Progressed  How Provided: Verbal, Demonstration, Written  Provided to: Other (comment), Patient(wife)  Level of Understanding: Teach back education performed, Verbalized, Demonstrated     PT OP Goals     Row Name 09/19/19 1600          PT Short Term Goals    STG Date to Achieve  10/18/19  -GJ     STG 1  pt. to be I with initial HEP to facilitate self management of their condition  -GJ     STG 1 Progress  New  -GJ     STG 2  pt. to be educated in/verbalize understanding of the importance of posture/ergonomics in association with their condition to facilitate self management of their condition  -GJ     STG 2 Progress  New  -GJ        Long Term Goals    LTG Date to Achieve  12/20/19  -GJ     LTG 1  pt. to be I with advanced HEP to facilitate self management of their condition  -GJ     LTG 1 Progress  New  -GJ     LTG 2  pt. to report an LEFS >/= 60% to demonstrate decreased level of perceived disability  -GJ     LTG 2 Progress  New  -GJ     LTG 3  pt to report >/= 50% in ability to sadie/doff shoes/socks to facilitate ease with self care/dressing  -GJ     LTG 3 Progress  New  -GJ     LTG 4  pt. to ambulate with near normal heel to toe gait pattern with SC to  facilitate ease/safety with community mobility  -GJ     LTG 4 Progress  New  -GJ        Time Calculation    PT Goal Re-Cert Due Date  12/20/19  -GJ       User Key  (r) = Recorded By, (t) = Taken By, (c) = Cosigned By    Initials Name Provider Type    Yosvany Lindsey, PT Physical Therapist          PT Assessment/Plan     Row Name 09/19/19 1638          PT Assessment    Functional Limitations  Performance in sport activities;Limitations in functional capacity and performance;Impaired gait;Limitations in community activities;Performance in self-care ADL;Performance in leisure activities;Limitation in home management  -     Impairments  Balance;Range of motion;Pain;Joint mobility;Impaired postural alignment;Impaired muscle endurance;Gait;Muscle strength;Posture;Poor body mechanics;Endurance;Impaired flexibility;Impaired muscle length  -     Assessment Comments  Mr. Lino is a 89 y/o male.  He reports an apparent insidious onset of R hip pain from posterior lateral hip to anterior thigh to knee.  He had been an avid golfer, once a week, and is now unable to do so.  Aggravating activities include inability to don/doff socks on R, lift leg/move R leg, walking.   Denies red flags, changes in bowel/bladder. Does report intermittent sensation of numbness lateral R knee.  Denies groin pain.  He has been using a cane for a couple of weeks now.  Imaging studies suggest severe R hip joint degeneration, moderate to severe L spine joint space narrowing.  Mr. Lino is most interested in working of flexibility/balance.  He has an appointment to see Dr. Reeves on 10/31/19.  Mr. Lino presents using cane, forward flexed posture.  He demonstrates difficulty lifting R leg, is painful, demonstrates limited and painful PROM R hip particularly into IR, flexion.  (+) Verito.  He reports an LEFS score of 42%, scored 0-100, 100 represents no perceived disability. Mr. Lino demonstrates s/s consistent with degenerative changes of the  hip and spine which limit his ability to participate in household, self care, community, and golf activities.  Personal/aggravating factors affecting his care include but are not limited to multiple joint involvement, age.  He may benefit from skilled physical therapy intervention to address the above impairments.   -GJ     Please refer to paper survey for additional self-reported information  Yes  -GJ     Rehab Potential  Good  -GJ     Patient/caregiver participated in establishment of treatment plan and goals  Yes  -GJ     Patient would benefit from skilled therapy intervention  Yes  -GJ        PT Plan    PT Frequency  2x/week  -GJ     Predicted Duration of Therapy Intervention (Therapy Eval)  4-6 weeks   -GJ     Planned CPT's?  PT EVAL MOD COMPLELITY: 31942;PT RE-EVAL: 91737;PT THER ACT EA 15 MIN: 13212;PT THER PROC EA 15 MIN: 69354;PT MANUAL THERAPY EA 15 MIN: 80965;PT NEUROMUSC RE-EDUCATION EA 15 MIN: 25175;PT GAIT TRAINING EA 15 MIN: 81864;PT AQUATIC THERAPY EA 15 MIN: 26372;PT HOT OR COLD PACK TREAT MCARE  -GJ     PT Plan Comments  attempt warm up on Nustep, work on hip girdle strengthening as able, seated HS, LAQ, bridge, HR, possible R hip joint mobs, stretch hip flexors  -GJ       User Key  (r) = Recorded By, (t) = Taken By, (c) = Cosigned By    Initials Name Provider Type    GJ Yosvany Edwards, PT Physical Therapist            OP Exercises     Row Name 09/19/19 1648 09/19/19 1600          Total Minutes    42418 - PT Therapeutic Exercise Minutes  15  -GJ  --        Exercise 1    Exercise Name 1  --  supine hL hip abd  -GJ     Cueing 1  --  Verbal;Demo  -GJ     Reps 1  --  15  -GJ     Time 1  --  3 s  -GJ     Additional Comments  --  RTB  -GJ        Exercise 2    Exercise Name 2  --  supine HL Hip add  -GJ     Cueing 2  --  Verbal;Demo  -GJ     Reps 2  --  15  -GJ     Time 2  --  3 s  -GJ     Additional Comments  --  ball  -GJ        Exercise 3    Exercise Name 3  --  prone lying  -GJ     Cueing 3  --   Verbal  -GJ     Reps 3  --  1  -GJ     Time 3  --  2min  -GJ       User Key  (r) = Recorded By, (t) = Taken By, (c) = Cosigned By    Initials Name Provider Type    Yosvany Lindsey, PT Physical Therapist                        Outcome Measure Options: Lower Extremity Functional Scale (LEFS)(42%)         Time Calculation:     Start Time: 1400  Stop Time: 1445  Time Calculation (min): 45 min     Therapy Charges for Today     Code Description Service Date Service Provider Modifiers Qty    23160167821  PT THER PROC EA 15 MIN 9/19/2019 Yosvany Edwards, PT GP 1    [id number]  PT EVAL MOD COMPLEXITY 2 9/19/2019 Yosvany Edwards, PT GP 1          PT G-Codes  Outcome Measure Options: Lower Extremity Functional Scale (LEFS)(42%)         Yosvany Edwards, PT  9/19/2019

## 2019-10-01 NOTE — THERAPY TREATMENT NOTE
Outpatient Physical Therapy Ortho Treatment Note  [facility]     Patient Name: Nicholas Lino  : 1928  MRN: [id number]  Today's Date: 10/1/2019      Visit Date: 10/01/2019    Visit Dx:    ICD-10-CM ICD-9-CM   1. Right hip pain M25.551 719.45       Patient Active Problem List   Diagnosis   • Coronary artery disease involving native coronary artery of native heart without angina pectoris   • AA (aortic aneurysm) (CMS/HCC)   • HLD (hyperlipidemia)   • History of coronary artery stent placement   • Hypertension        Past Medical History:   Diagnosis Date   • Aortic aneurysm (CMS/HCC)    • CAD (coronary artery disease)    • Hyperlipidemia    • Hypertension         Past Surgical History:   Procedure Laterality Date   • APPENDECTOMY     • ARTERIAL ANEURYSM REPAIR     • CARDIAC CATHETERIZATION     • CORONARY ANGIOPLASTY WITH STENT PLACEMENT     • COSMETIC SURGERY     • FEMORAL ARTERY REPAIR     • MOLE REMOVAL         PT Ortho     Row Name 10/01/19 1600       Gait/Stairs Assessment/Training    Gait/Stairs Assessment/Training  gait/ambulation assistive device  -SI    [place] Level (Gait)  conditional independence  -SI    Assistive Device (Gait)  cane, tripod  -SI    Distance in Feet (Gait)  50  -SI    Deviations/Abnormal Patterns (Gait)  base of support, narrow;antalgic;stride length decreased  -SI    Comment (Gait/Stairs)  Gait better with verbal cueing.  Decrease stride lenght  -SI    Row Name 10/01/19 1500       Subjective Comments    Subjective Comments  Only have pain when first stand up and with lot walking.  Points to lateral hip and right knee to lateral calf.  -SI      User Key  (r) = Recorded By, (t) = Taken By, (c) = Cosigned By    Initials Name Provider Type    SI Mica Corrigan PTA Physical Therapy Assistant                      PT Assessment/Plan     Row Name 10/01/19 1652          PT Assessment    Assessment Comments  Re-instruct / review of given eex and upgraded.  No pain during time  of tx.  pt very eager to get back to [employer] once a week  -SI       User Key  (r) = Recorded By, (t) = Taken By, (c) = Cosigned By    Initials Name Provider Type    Mica Sanderson PTA Physical Therapy Assistant            OP Exercises     Row Name 10/01/19 1600 10/01/19 1500          Subjective Comments    Subjective Comments  --  Only have pain when first stand up and with lot walking.  Points to lateral hip and right knee to lateral calf.  -SI        Total Minutes    51823 - Gait Training Minutes   --  -SI  5  -SI     28899 - PT Therapeutic Exercise Minutes  --  40  -SI        Exercise 1    Exercise Name 1  --  supine hL hip abd  -SI     Cueing 1  --  Verbal;Demo  -SI     Reps 1  --  15  -SI     Time 1  --  3 s  -SI        Exercise 2    Exercise Name 2  --  supine HL Hip add  -SI     Cueing 2  --  Verbal;Demo  -SI     Reps 2  --  15  -SI     Time 2  --  3 s  -SI     Additional Comments  --  bal  -SI        Exercise 3    Exercise Name 3  --  prone lying  -SI     Cueing 3  --  Verbal  -SI     Reps 3  --  1  -SI     Time 3  --  2min  -SI     Additional Comments  --  very end of ex session  -SI        Exercise 4    Exercise Name 4  --  1/2 trunk rotation with TA  -SI     Reps 4  --  10  -SI        Exercise 5    Exercise Name 5  --  hip bridge  -SI     Reps 5  --  15  -SI        Exercise 6    Exercise Name 6  --  LAQ  -SI     Reps 6  --  15  -SI        Exercise 7    Exercise Name 7  --  HS stretch sit side mat  -SI     Reps 7  --  5  -SI     Time 7  --  20  -SI       User Key  (r) = Recorded By, (t) = Taken By, (c) = Cosigned By    Initials Name Provider Type    Mica Sanderson PTA Physical Therapy Assistant                           Therapy Education  Given: HEP, Symptoms/condition management(upright posture with gait)  Program: Progressed  How Provided: Verbal, Demonstration, Written  Provided to: Patient  Level of Understanding: Teach back education performed              Time Calculation:   Start Time:  1530  Stop Time: 1615  Time Calculation (min): 45 min  Total Timed Code Minutes- PT: 45 minute(s)  Therapy Charges for Today     Code Description Service Date Service Provider Modifiers Qty    38825493596 HC PT THER PROC EA 15 MIN 10/1/2019 Mica Corrigan, PTA GP 3                    Mica Corrigan, ANNA  10/1/2019

## 2019-10-03 NOTE — THERAPY TREATMENT NOTE
Outpatient Physical Therapy Ortho Treatment Note  [facility]     Patient Name: Nicholas Lino  : 1928  MRN: [id number]  Today's Date: 10/3/2019      Visit Date: 10/03/2019    Visit Dx:    ICD-10-CM ICD-9-CM   1. Right hip pain M25.551 719.45       Patient Active Problem List   Diagnosis   • Coronary artery disease involving native coronary artery of native heart without angina pectoris   • AA (aortic aneurysm) (CMS/HCC)   • HLD (hyperlipidemia)   • History of coronary artery stent placement   • Hypertension        Past Medical History:   Diagnosis Date   • Aortic aneurysm (CMS/HCC)    • CAD (coronary artery disease)    • Hyperlipidemia    • Hypertension         Past Surgical History:   Procedure Laterality Date   • APPENDECTOMY     • ARTERIAL ANEURYSM REPAIR     • CARDIAC CATHETERIZATION     • CORONARY ANGIOPLASTY WITH STENT PLACEMENT     • COSMETIC SURGERY     • FEMORAL ARTERY REPAIR     • MOLE REMOVAL         PT Ortho     Row Name 10/03/19 1700       Posture/Observations    Observations  Edema pitted edema BLE's right greater left lower legs  -SI    Row Name 10/01/19 1600       Gait/Stairs Assessment/Training    Gait/Stairs Assessment/Training  gait/ambulation assistive device  -SI    [place] Level (Gait)  conditional independence  -SI    Assistive Device (Gait)  cane, tripod  -SI    Distance in Feet (Gait)  50  -SI    Deviations/Abnormal Patterns (Gait)  base of support, narrow;antalgic;stride length decreased  -SI    Comment (Gait/Stairs)  Gait better with verbal cueing.  Decrease stride lenght  -SI    Row Name 10/01/19 1500       Subjective Comments    Subjective Comments  Only have pain when first stand up and with lot walking.  Points to lateral hip and right knee to lateral calf.  -SI      User Key  (r) = Recorded By, (t) = Taken By, (c) = Cosigned By    Initials Name Provider Type    SI Mica Corrigan, PTA Physical Therapy Assistant                      PT Assessment/Plan     Row Name  10/03/19 1753          PT Assessment    Assessment Comments  Pt with FF posture suing cane and decrease striide length.  He likes the ex and feels betteer for awhile afterward but pain right hip end ranges and with FWB  -SI       User Key  (r) = Recorded By, (t) = Taken By, (c) = Cosigned By    Initials Name Provider Type    Mica Sanderson PTA Physical Therapy Assistant            OP Exercises     Row Name 10/03/19 1700             Total Minutes    72332 - PT Therapeutic Exercise Minutes  45  -SI         Exercise 1    Exercise Name 1  supine hL hip abd  -SI      Cueing 1  Verbal;Demo  -SI      Reps 1  15  -SI      Time 1  3 s  -SI      Additional Comments  red  -SI         Exercise 2    Exercise Name 2  supine HL Hip add  -SI      Cueing 2  Verbal;Demo  -SI      Reps 2  15  -SI      Time 2  3 s  -SI         Exercise 3    Exercise Name 3  prone lying  -SI      Cueing 3  Verbal  -SI      Reps 3  1  -SI      Time 3  2min  -SI         Exercise 4    Exercise Name 4  1/2 trunk rotation with TA  -SI      Reps 4  10  -SI         Exercise 5    Exercise Name 5  hip bridge  -SI      Reps 5  15  -SI         Exercise 6    Exercise Name 6  LAQ  -SI      Reps 6  15  -SI         Exercise 7    Exercise Name 7  HS stretch sit side mat  -SI      Reps 7  5  -SI      Time 7  20  -SI         Exercise 8    Exercise Name 8  heel fits in parallel bars  -SI      Reps 8  15  -SI         Exercise 9    Exercise Name 9  High knee march in parallel bars  -SI      Time 9  2 min  -SI         Exercise 10    Exercise Name 10  side stepping in parallel bars  -SI         Exercise 11    Exercise Name 11  back against bar, erect posture to neutral  -SI      Reps 11  3  -SI        User Key  (r) = Recorded By, (t) = Taken By, (c) = Cosigned By    Initials Name Provider Type    Mica Sanderson PTA Physical Therapy Assistant                           Therapy Education  Given: HEP, Symptoms/condition management, Edema management(pt and wife  instructed in lelevating 45 min BID (feet and knees higher than heart))  Program: Progressed  How Provided: Verbal, Demonstration, Written  Provided to: Patient, Caregiver  Level of Understanding: Teach back education performed              Time Calculation:   Start Time: 1700  Stop Time: 1745  Time Calculation (min): 45 min  Total Timed Code Minutes- PT: 45 minute(s)  Therapy Charges for Today     Code Description Service Date Service Provider Modifiers Qty    74821849456 HC PT THER PROC EA 15 MIN 10/3/2019 Mica Corrigan, PTA GP 3                    Mica Corrigan, ANNA  10/3/2019

## 2019-10-08 NOTE — THERAPY TREATMENT NOTE
Outpatient Physical Therapy Ortho Treatment Note  [facility]     Patient Name: Nicholas Lino  : 1928  MRN: [id number]  Today's Date: 10/8/2019      Visit Date: 10/08/2019    Visit Dx:    ICD-10-CM ICD-9-CM   1. Right hip pain M25.551 719.45       Patient Active Problem List   Diagnosis   • Coronary artery disease involving native coronary artery of native heart without angina pectoris   • AA (aortic aneurysm) (CMS/HCC)   • HLD (hyperlipidemia)   • History of coronary artery stent placement   • Hypertension        Past Medical History:   Diagnosis Date   • Aortic aneurysm (CMS/HCC)    • CAD (coronary artery disease)    • Hyperlipidemia    • Hypertension         Past Surgical History:   Procedure Laterality Date   • APPENDECTOMY     • ARTERIAL ANEURYSM REPAIR     • CARDIAC CATHETERIZATION     • CORONARY ANGIOPLASTY WITH STENT PLACEMENT     • COSMETIC SURGERY     • FEMORAL ARTERY REPAIR     • MOLE REMOVAL         PT Ortho     Row Name 10/08/19 1600       Subjective Comments    Subjective Comments  pt states he feels better since starting Therpy ex  -SI       Subjective Pain    Able to rate subjective pain?  yes  -SI    Subjective Pain Comment  Pain  located anterior /lateral thigh to knee  -SI       Posture/Observations    Observations  Edema R>L but now elevating BID  -SI       Balance Skills Training    SLS  5 seconds on left , 2-3 seconds on right  -SI      User Key  (r) = Recorded By, (t) = Taken By, (c) = Cosigned By    Initials Name Provider Type    SI Mica Corrigan, PTA Physical Therapy Assistant                      PT Assessment/Plan     Row Name 10/08/19 1550          PT Assessment    Assessment Comments  Righthip joint with decrease ROM.  Right anteriro and lateral thigh pain with AROM at hip (flexon), and sme location with WB'ing on RLE.  Gait is altered and decrease stride length with RLE using cane.   -SI       User Key  (r) = Recorded By, (t) = Taken By, (c) = Cosigned By    Initials  Name Provider Type    Mica Sanderson PTA Physical Therapy Assistant            OP Exercises     Row Name 10/08/19 1600 10/08/19 1500          Subjective Comments    Subjective Comments  pt states he feels better since starting Therpy ex  -SI  --        Subjective Pain    Able to rate subjective pain?  yes  -SI  --     Subjective Pain Comment  Pain  located anterior /lateral thigh to knee  -SI  --        Total Minutes    31209 - PT Therapeutic Exercise Minutes  --  -SI  45  -SI        Exercise 1    Exercise Name 1  --  supine hL hip abd  -SI     Cueing 1  --  Verbal;Demo  -SI     Reps 1  --  15  -SI     Time 1  --  3 s  -SI        Exercise 2    Exercise Name 2  --  supine HL Hip add  -SI     Cueing 2  --  Verbal;Demo  -SI     Reps 2  --  15  -SI     Time 2  --  3 s  -SI        Exercise 3    Exercise Name 3  --  Gastroc stretch with strap  -SI     Time 3  --  20  -SI        Exercise 4    Exercise Name 4  --  1/2 trunk rotation with TA  -SI     Reps 4  --  10  -SI        Exercise 5    Exercise Name 5  --  hip bridge  -SI     Reps 5  --  15  -SI        Exercise 6    Exercise Name 6  --  LAQ and SAQ  -SI     Reps 6  --  15  -SI     Additional Comments  --  3lbs  -SI        Exercise 7    Exercise Name 7  --  HS stretch sit side mat  -SI     Reps 7  --  5  -SI     Time 7  --  20  -SI        Exercise 8    Exercise Name 8  --  heel fits in parallel bars  -SI     Reps 8  --  15  -SI        Exercise 9    Exercise Name 9  --  High knee march in parallel bars  -SI     Time 9  --  2 min  -SI        Exercise 10    Exercise Name 10  --  side stepping in parallel bars  -SI        Exercise 11    Exercise Name 11  --  back against bar, erect posture to neutral  -SI     Reps 11  --  3  -SI       User Key  (r) = Recorded By, (t) = Taken By, (c) = Cosigned By    Initials Name Provider Type    Mica Sanderson PTA Physical Therapy Assistant                       PT OP Goals     Row Name 10/08/19 1600          PT Short Term Goals     STG 1  pt. to be I with initial HEP to facilitate self management of their condition  -SI     STG 1 Progress  Met  -SI     STG 2  pt. to be educated in/verbalize understanding of the importance of posture/ergonomics in association with their condition to facilitate self management of their condition  -SI     STG 2 Progress  Ongoing  -SI       User Key  (r) = Recorded By, (t) = Taken By, (c) = Cosigned By    Initials Name Provider Type    SI Mica Corrigan PTA Physical Therapy Assistant          Therapy Education  Given: HEP, Fall prevention and home safety, Edema management  Program: Progressed  How Provided: Verbal, Demonstration, Written  Provided to: Patient  Level of Understanding: Teach back education performed              Time Calculation:   Start Time: 1525  Stop Time: 1610  Time Calculation (min): 45 min  Total Timed Code Minutes- PT: 45 minute(s)  Therapy Charges for Today     Code Description Service Date Service Provider Modifiers Qty    21820136190 HC PT THER PROC EA 15 MIN 10/8/2019 Mica Corrigan PTA GP 3                    Mica Corrigan PTA  10/8/2019

## 2019-10-10 NOTE — THERAPY TREATMENT NOTE
Outpatient Physical Therapy Ortho Treatment Note  [facility]     Patient Name: Nicholas Lino  : 1928  MRN: [id number]  Today's Date: 10/10/2019      Visit Date: 10/10/2019    Visit Dx:    ICD-10-CM ICD-9-CM   1. Right hip pain M25.551 719.45       Patient Active Problem List   Diagnosis   • Coronary artery disease involving native coronary artery of native heart without angina pectoris   • AA (aortic aneurysm) (CMS/HCC)   • HLD (hyperlipidemia)   • History of coronary artery stent placement   • Hypertension        Past Medical History:   Diagnosis Date   • Aortic aneurysm (CMS/HCC)    • CAD (coronary artery disease)    • Hyperlipidemia    • Hypertension         Past Surgical History:   Procedure Laterality Date   • APPENDECTOMY     • ARTERIAL ANEURYSM REPAIR     • CARDIAC CATHETERIZATION     • CORONARY ANGIOPLASTY WITH STENT PLACEMENT     • COSMETIC SURGERY     • FEMORAL ARTERY REPAIR     • MOLE REMOVAL         PT Ortho     Row Name 10/08/19 1600       Subjective Comments    Subjective Comments  pt states he feels better since starting Therpy ex  -SI       Subjective Pain    Able to rate subjective pain?  yes  -SI    Subjective Pain Comment  Pain  located anterior /lateral thigh to knee  -SI       Posture/Observations    Observations  Edema R>L but now elevating BID  -SI       Balance Skills Training    SLS  5 seconds on left , 2-3 seconds on right  -SI      User Key  (r) = Recorded By, (t) = Taken By, (c) = Cosigned By    Initials Name Provider Type    Mica Sanderson, PTA Physical Therapy Assistant                      PT Assessment/Plan     Row Name 10/10/19 1700          PT Assessment    Assessment Comments  Pt continues to report decreased pain with therapy. Continued to note decreased R hip mobility, gait improved in // bars compared to SPC. Pt reports good compliance with HEP.  -RS        PT Plan    PT Plan Comments  COnsider increased standig/ balance activities as tolerance allows.  -RS        User Key  (r) = Recorded By, (t) = Taken By, (c) = Cosigned By    Initials Name Provider Type    RS Sarah Stafford, PT Physical Therapist            OP Exercises     Row Name 10/10/19 1700             Subjective Comments    Subjective Comments  pt states having legs up above heart has helped with swelling, no pain today.  -RS         Subjective Pain    Able to rate subjective pain?  yes  -RS      Pre-Treatment Pain Level  0  -RS         Total Minutes    63966 - PT Therapeutic Exercise Minutes  40  -RS         Exercise 1    Exercise Name 1  supine hL hip abd  -RS      Cueing 1  Verbal;Demo  -RS      Reps 1  15  -RS      Time 1  3 s  -RS      Additional Comments  RTB  -RS         Exercise 2    Exercise Name 2  supine HL Hip add  -RS      Cueing 2  Verbal;Demo  -RS      Reps 2  15  -RS      Time 2  3 s  -RS      Additional Comments  pillow  -RS         Exercise 3    Exercise Name 3  Gastroc stretch with strap  -RS      Reps 3  3  -RS      Time 3  20  -RS         Exercise 4    Exercise Name 4  1/2 trunk rotation with TA  -RS      Reps 4  10  -RS         Exercise 5    Exercise Name 5  hip bridge  -RS      Reps 5  15  -RS         Exercise 6    Exercise Name 6  LAQ and SAQ  -RS      Reps 6  15  -RS      Additional Comments  3#  -RS         Exercise 7    Exercise Name 7  HS stretch sit side mat  -RS      Reps 7  5  -RS      Time 7  20  -RS         Exercise 8    Exercise Name 8  heel lift in parallel bars  -RS      Reps 8  15  -RS         Exercise 9    Exercise Name 9  High knee march in parallel bars  -RS      Reps 9  2 laps  -RS      Time 9  --  -RS         Exercise 10    Exercise Name 10  side stepping in parallel bars  -RS      Reps 10  2 laps  -RS         Exercise 11    Exercise Name 11  back against bar, erect posture to neutral  -RS      Reps 11  3  -RS         Exercise 12    Exercise Name 12  Nustep  -RS      Cueing 12  Verbal  -RS      Time 12  5 min  -RS      Additional Comments  UE/LE  -RS        User Key   (r) = Recorded By, (t) = Taken By, (c) = Cosigned By    Initials Name Provider Type    RS Sarah Stafford, PT Physical Therapist                                          Time Calculation:   Start Time: 1700  Stop Time: 1745  Time Calculation (min): 45 min  Therapy Charges for Today     Code Description Service Date Service Provider Modifiers Qty    15526915139  PT THER PROC EA 15 MIN 10/10/2019 Sarah Stafford, PT GP 3                    Sarah Stafford, PT  10/10/2019

## 2019-10-15 NOTE — THERAPY TREATMENT NOTE
"    Outpatient Physical Therapy Ortho Treatment Note  [facility]     Patient Name: Nicholas Lino  : 1928  MRN: [id number]  Today's Date: 10/15/2019      Visit Date: 10/15/2019    Visit Dx:    ICD-10-CM ICD-9-CM   1. Right hip pain M25.551 719.45       Patient Active Problem List   Diagnosis   • Coronary artery disease involving native coronary artery of native heart without angina pectoris   • AA (aortic aneurysm) (CMS/HCC)   • HLD (hyperlipidemia)   • History of coronary artery stent placement   • Hypertension        Past Medical History:   Diagnosis Date   • Aortic aneurysm (CMS/HCC)    • CAD (coronary artery disease)    • Hyperlipidemia    • Hypertension         Past Surgical History:   Procedure Laterality Date   • APPENDECTOMY     • ARTERIAL ANEURYSM REPAIR     • CARDIAC CATHETERIZATION     • CORONARY ANGIOPLASTY WITH STENT PLACEMENT     • COSMETIC SURGERY     • FEMORAL ARTERY REPAIR     • MOLE REMOVAL         PT Ortho     Row Name 10/15/19 1700       Subjective Comments    Subjective Comments  Pt states he walks better rwithout the cane.  States he is doing better since started PT but not balalnced enough to play 9 holes of golf like did early september.  -SI       Subjective Pain    Able to rate subjective pain?  yes  -SI    Subjective Pain Comment  \"uncomfortable\" at right lateral hip at night  -SI       Posture/Observations    Observations  Edema He is elevating BID 45 min.  -SI       General ROM    GENERAL ROM COMMENTS  decrease right hip ROM ass on evaal especiially flrxion 90 supine, and IR  -SI       MMT Right Lower Ext    Rt Hip Extension MMT, Gross Movement  (3/5) fair  -SI    Rt Hip ABduction MMT, Gross Movement  (3/5) fair  -SI       MMT Left Lower Ext    Lt Hip Extension MMT, Gross Movement  (4/5) good  -SI    Lt Hip ABduction MMT, Gross Movement  (4/5) good  -SI       Balance Skills Training    06842 -  PT Neuromuscular Reeducation Minutes  10  -SI    Gait Balance Activities  " "backwards;side-stepping in parallel bars  -SI    SLS  5 seconds left, 2-3 right with pain  -SI       Gait/Stairs Assessment/Training    Handrail Location (Stairs)  right side (ascending)  -SI    Number of Steps (Stairs)  5  -SI    Ascending Technique (Stairs)  step-over-step  -SI    Descending Technique (Stairs)  step-over-step  -SI    Comment (Gait/Stairs)  gait in PT without cane.  Sride on RLE better withhout cane than with.  -SI      User Key  (r) = Recorded By, (t) = Taken By, (c) = Cosigned By    Initials Name Provider Type    Mica Sanderson PTA Physical Therapy Assistant                      PT Assessment/Plan     Row Name 10/15/19 1065          PT Assessment    Assessment Comments  He is not going to use the cane in his home, only when oob.  min pain with sitting and lying down ex.  Increase lateral hip pain right with standing activites for balance and gait.  -SI       User Key  (r) = Recorded By, (t) = Taken By, (c) = Cosigned By    Initials Name Provider Type    Mica Sanderson PTA Physical Therapy Assistant            OP Exercises     Row Name 10/15/19 1700             Subjective Comments    Subjective Comments  Pt states he walks better rwithout the cane.  States he is doing better since started PT but not balalnced enough to play 9 holes of golf like did early september.  -SI         Subjective Pain    Able to rate subjective pain?  yes  -SI      Subjective Pain Comment  \"uncomfortable\" at right lateral hip at night  -SI         Total Minutes    81769 - PT Therapeutic Exercise Minutes  35  -SI      28403 -  PT Neuromuscular Reeducation Minutes  10  -SI         Exercise 1    Exercise Name 1  supine hL hip abd  -SI      Cueing 1  Verbal;Demo  -SI      Reps 1  15  -SI      Time 1  3 s  -SI         Exercise 2    Exercise Name 2  supine HL Hip add  -SI      Cueing 2  Verbal;Demo  -SI      Reps 2  15  -SI      Time 2  3 s  -SI         Exercise 3    Exercise Name 3  Gastroc stretch with strap  -SI   "    Reps 3  3  -SI      Time 3  20  -SI         Exercise 4    Exercise Name 4  1/2 trunk rotation with TA  -SI      Reps 4  10  -SI         Exercise 5    Exercise Name 5  hip bridge  -SI      Reps 5  15  -SI         Exercise 6    Exercise Name 6  LAQ and SAQ  -SI      Reps 6  15  -SI         Exercise 7    Exercise Name 7  HS stretch sit side mat  -SI      Reps 7  5  -SI      Time 7  20  -SI         Exercise 8    Exercise Name 8  heel lift in parallel bars  -SI      Reps 8  15  -SI         Exercise 9    Exercise Name 9  High knee march in parallel bars  -SI      Reps 9  2 laps  -SI         Exercise 10    Exercise Name 10  side stepping in parallel bars  -SI      Reps 10  2 laps  -SI         Exercise 11    Exercise Name 11  back against bar, erect posture to neutral  -SI      Reps 11  3  -SI         Exercise 12    Exercise Name 12  Nustep  -SI      Cueing 12  Verbal  -SI      Time 12  5 min  -SI        User Key  (r) = Recorded By, (t) = Taken By, (c) = Cosigned By    Initials Name Provider Type    Mica Sanderson PTA Physical Therapy Assistant                       PT OP Goals     Row Name 10/15/19 1700          PT Short Term Goals    STG 2  pt. to be educated in/verbalize understanding of the importance of posture/ergonomics in association with their condition to facilitate self management of their condition  -SI     STG 2 Progress  Ongoing  -SI        Long Term Goals    LTG 1  pt. to be I with advanced HEP to facilitate self management of their condition  -SI     LTG 1 Progress  Progressing  -SI     LTG 4  pt. to ambulate with near normal heel to toe gait pattern with SC to facilitate ease/safety with community mobility  -SI     LTG 4 Progress  Ongoing  -SI       User Key  (r) = Recorded By, (t) = Taken By, (c) = Cosigned By    Initials Name Provider Type    Mica Sanderson PTA Physical Therapy Assistant                         Time Calculation:   Start Time: 1700  Stop Time: 1747  Time Calculation (min): 47  min  Total Timed Code Minutes- PT: 45 minute(s)  Therapy Charges for Today     Code Description Service Date Service Provider Modifiers Qty    14580967798 HC PT NEUROMUSC RE EDUCATION EA 15 MIN 10/15/2019 Mica Corrigan, PTA GP 1    55703569232 HC PT THER PROC EA 15 MIN 10/15/2019 Mica Corrigan PTA GP 2                    Mica Corrigan, ANNA  10/15/2019

## 2019-10-17 NOTE — THERAPY TREATMENT NOTE
"    Outpatient Physical Therapy Ortho Treatment Note  [facility]     Patient Name: Nicholas Lino  : 1928  MRN: [id number]  Today's Date: 10/17/2019      Visit Date: 10/17/2019    Visit Dx:    ICD-10-CM ICD-9-CM   1. Right hip pain M25.551 719.45       Patient Active Problem List   Diagnosis   • Coronary artery disease involving native coronary artery of native heart without angina pectoris   • AA (aortic aneurysm) (CMS/HCC)   • HLD (hyperlipidemia)   • History of coronary artery stent placement   • Hypertension        Past Medical History:   Diagnosis Date   • Aortic aneurysm (CMS/HCC)    • CAD (coronary artery disease)    • Hyperlipidemia    • Hypertension         Past Surgical History:   Procedure Laterality Date   • APPENDECTOMY     • ARTERIAL ANEURYSM REPAIR     • CARDIAC CATHETERIZATION     • CORONARY ANGIOPLASTY WITH STENT PLACEMENT     • COSMETIC SURGERY     • FEMORAL ARTERY REPAIR     • MOLE REMOVAL         PT Ortho     Row Name 10/17/19 1700       Subjective Comments    Subjective Comments  pain is the limiting factor  -SI       Subjective Pain    Able to rate subjective pain?  yes  -SI    Subjective Pain Comment  increase right groin to lateral hip pain with walking more withoout cane at home..  -SI       Posture/Observations    Observations  Edema dimas   -SI       Gait/Stairs Assessment/Training    Comment (Gait/Stairs)  step to gait with cane.  Few steps reciprocal but an effort.  Seems limited due to pain with WB'ing RLE  -SI    Row Name 10/15/19 170       Subjective Comments    Subjective Comments  Pt states he walks better rwithout the cane.  States he is doing better since started PT but not balalnced enough to play 9 holes of golf like did early september.  -SI       Subjective Pain    Able to rate subjective pain?  yes  -SI    Subjective Pain Comment  \"uncomfortable\" at right lateral hip at night  -SI       Posture/Observations    Observations  Edema He is elevating BID 45 min.  -SI    "    General ROM    GENERAL ROM COMMENTS  decrease right hip ROM ass on evaal especiially flrxion 90 supine, and IR  -SI       MMT Right Lower Ext    Rt Hip Extension MMT, Gross Movement  (3/5) fair  -SI    Rt Hip ABduction MMT, Gross Movement  (3/5) fair  -SI       MMT Left Lower Ext    Lt Hip Extension MMT, Gross Movement  (4/5) good  -SI    Lt Hip ABduction MMT, Gross Movement  (4/5) good  -SI       Balance Skills Training    09673 -  PT Neuromuscular Reeducation Minutes  10  -SI    Gait Balance Activities  backwards;side-stepping in parallel bars  -SI    SLS  5 seconds left, 2-3 right with pain  -SI       Gait/Stairs Assessment/Training    Handrail Location (Stairs)  right side (ascending)  -SI    Number of Steps (Stairs)  5  -SI    Ascending Technique (Stairs)  step-over-step  -SI    Descending Technique (Stairs)  step-over-step  -SI    Comment (Gait/Stairs)  gait in PT without cane.  Sride on RLE better withhout cane than with.  -SI      User Key  (r) = Recorded By, (t) = Taken By, (c) = Cosigned By    Initials Name Provider Type    Mica Sanderson PTA Physical Therapy Assistant                      PT Assessment/Plan     Row Name 10/17/19 7752          PT Assessment    Assessment Comments  pain with ex today supine doing basic ROM.  Gait still step to with cane due to antalgia vs balance issue. walked 1/2 hour on TM prior to hip pain 1.5 minths ago  -SI       User Key  (r) = Recorded By, (t) = Taken By, (c) = Cosigned By    Initials Name Provider Type    Mica Sanderson PTA Physical Therapy Assistant          Modalities     Row Name 10/17/19 1700             Ice    Ice Applied  Yes  -SI      Location  right lateral hip, groin, lateral upper leg  -SI      Rx Minutes  10 mins  -SI      Ice S/P Rx  Yes  -SI        User Key  (r) = Recorded By, (t) = Taken By, (c) = Cosigned By    Initials Name Provider Type    Mica Sanderson PTA Physical Therapy Assistant        OP Exercises     Row Name 10/17/19 1700              Subjective Comments    Subjective Comments  pain is the limiting factor  -SI         Subjective Pain    Able to rate subjective pain?  yes  -SI      Subjective Pain Comment  increase right groin to lateral hip pain with walking more withoout cane at home..  -SI         Total Minutes    19556 - PT Therapeutic Exercise Minutes  40  -SI         Exercise 1    Exercise Name 1  supine hL hip abd  -SI      Cueing 1  Verbal;Demo  -SI      Reps 1  15  -SI      Time 1  3 s  -SI      Additional Comments  seated today  -SI         Exercise 2    Exercise Name 2  supine HL Hip add  -SI      Cueing 2  Verbal;Demo  -SI      Reps 2  15  -SI      Time 2  3 s  -SI      Additional Comments  seated today  -SI         Exercise 3    Exercise Name 3  Gastroc stretch with strap  -SI      Reps 3  3  -SI      Time 3  20  -SI      Additional Comments  ---------------  -SI         Exercise 4    Exercise Name 4  1/2 trunk rotation with TA  -SI      Reps 4  10  -SI      Additional Comments  -----------------  -SI         Exercise 5    Exercise Name 5  hip bridge  -SI      Reps 5  15  -SI      Additional Comments  ----------------  -SI         Exercise 6    Exercise Name 6  LAQ and SAQ  -SI      Reps 6  15  -SI      Additional Comments  2lbs  -SI         Exercise 7    Exercise Name 7  HS stretch sit side mat  -SI      Reps 7  5  -SI      Time 7  20  -SI      Additional Comments  --------------  -SI         Exercise 8    Exercise Name 8  heel lift in parallel bars  -SI      Reps 8  15  -SI      Additional Comments  seted today  -SI         Exercise 9    Exercise Name 9  High knee march in parallel bars  -SI      Reps 9  2 laps  -SI      Additional Comments  seated alternate  -SI         Exercise 10    Exercise Name 10  side stepping in parallel bars  -SI      Reps 10  2 laps  -SI      Additional Comments  ---------  -SI         Exercise 11    Exercise Name 11  back against bar, erect posture to neutral  -SI      Reps 11  3  -SI       Additional Comments  --------------------  -SI         Exercise 12    Exercise Name 12  Nustep  -SI      Cueing 12  Verbal  -SI      Time 12  5 min  -SI         Exercise 13    Exercise Name 13  supine hel slides  -SI      Reps 13  10  -SI      Additional Comments  painful posterior and lateral hip  -SI        User Key  (r) = Recorded By, (t) = Taken By, (c) = Cosigned By    Initials Name Provider Type    SI Mica Corrigan PTA Physical Therapy Assistant                                          Time Calculation:   Start Time: 1700  Stop Time: 1743  Time Calculation (min): 43 min  Total Timed Code Minutes- PT: 40 minute(s)  Therapy Charges for Today     Code Description Service Date Service Provider Modifiers Qty    55255418445 HC PT THER PROC EA 15 MIN 10/17/2019 Mica Corrigan PTA GP 3                    Mica Corrigan PTA  10/17/2019

## 2019-10-22 NOTE — THERAPY TREATMENT NOTE
Outpatient Physical Therapy Ortho Treatment Note  [facility]     Patient Name: Nicholas Lino  : 1928  MRN: [id number]  Today's Date: 10/22/2019      Visit Date: 10/22/2019    Visit Dx:    ICD-10-CM ICD-9-CM   1. Right hip pain M25.551 719.45       Patient Active Problem List   Diagnosis   • Coronary artery disease involving native coronary artery of native heart without angina pectoris   • AA (aortic aneurysm) (CMS/HCC)   • HLD (hyperlipidemia)   • History of coronary artery stent placement   • Hypertension        Past Medical History:   Diagnosis Date   • Aortic aneurysm (CMS/HCC)    • CAD (coronary artery disease)    • Hyperlipidemia    • Hypertension         Past Surgical History:   Procedure Laterality Date   • APPENDECTOMY     • ARTERIAL ANEURYSM REPAIR     • CARDIAC CATHETERIZATION     • CORONARY ANGIOPLASTY WITH STENT PLACEMENT     • COSMETIC SURGERY     • FEMORAL ARTERY REPAIR     • MOLE REMOVAL         PT Ortho     Row Name 10/22/19 1600       Subjective Comments    Subjective Comments  Chipped and put with sone over weekend and did well.  Min hip or groing pain.  -SI       Subjective Pain    Able to rate subjective pain?  yes  -SI       Posture/Observations    Observations  Edema right lower leg (didn't elevate today and increase edema)  -SI       MMT Right Lower Ext    Rt Hip Extension MMT, Gross Movement  (3/5) fair  -SI    Rt Hip ABduction MMT, Gross Movement  (3/5) fair  -SI       MMT Left Lower Ext    Lt Hip Extension MMT, Gross Movement  (4/5) good  -SI    Lt Hip ABduction MMT, Gross Movement  (4/5) good  -SI      User Key  (r) = Recorded By, (t) = Taken By, (c) = Cosigned By    Initials Name Provider Type    SI Mica Corrigan, PTA Physical Therapy Assistant                      PT Assessment/Plan     Row Name 10/22/19 8343          PT Assessment    Assessment Comments  Pt states he is feeling comfortable with the ex given.  He feels the Therapy is helping.  His gait is observed to  be uncahnged.  With verbal lcues he will step through otherwise step to gait.  -SI       User Key  (r) = Recorded By, (t) = Taken By, (c) = Cosigned By    Initials Name Provider Type    Mica Sanderson, PTA Physical Therapy Assistant            OP Exercises     Row Name 10/22/19 1600             Subjective Comments    Subjective Comments  Chipped and put with sone over weekend and did well.  Min hip or groing pain.  -SI         Subjective Pain    Able to rate subjective pain?  yes  -SI         Total Minutes    58929 - PT Therapeutic Exercise Minutes  40  -SI         Exercise 1    Exercise Name 1  supine hL hip abd  -SI      Cueing 1  Verbal;Demo  -SI      Reps 1  15  -SI      Time 1  3 s  -SI         Exercise 2    Exercise Name 2  supine HL Hip add  -SI      Cueing 2  Verbal;Demo  -SI      Reps 2  15  -SI      Time 2  3 s  -SI         Exercise 3    Exercise Name 3  Gastroc stretch with strap  -SI      Reps 3  3  -SI      Time 3  20  -SI         Exercise 4    Exercise Name 4  1/2 trunk rotation with TA  -SI      Reps 4  10  -SI         Exercise 5    Exercise Name 5  hip bridge  -SI      Reps 5  15  -SI         Exercise 6    Exercise Name 6  LAQ and SAQ  -SI      Reps 6  15  -SI         Exercise 7    Exercise Name 7  HS stretch sit side mat  -SI      Reps 7  5  -SI      Time 7  20  -SI         Exercise 8    Exercise Name 8  heel lift in parallel bars  -SI      Reps 8  15  -SI         Exercise 9    Exercise Name 9  High knee march in parallel bars  -SI      Reps 9  2 laps  -SI         Exercise 10    Exercise Name 10  side stepping in parallel bars  -SI      Reps 10  2 laps  -SI         Exercise 11    Exercise Name 11  back against bar, erect posture to neutral  -SI      Reps 11  3  -SI         Exercise 12    Exercise Name 12  Nustep  -SI      Cueing 12  Verbal  -SI      Time 12  5 min  -SI         Exercise 13    Exercise Name 13  supine hel slides  -SI      Reps 13  10  -SI      Additional Comments  better tolerated  today  -SI        User Key  (r) = Recorded By, (t) = Taken By, (c) = Cosigned By    Initials Name Provider Type    Mica Sanderson PTA Physical Therapy Assistant                       PT OP Goals     Row Name 10/22/19 1600          PT Short Term Goals    STG 2  pt. to be educated in/verbalize understanding of the importance of posture/ergonomics in association with their condition to facilitate self management of their condition  -SI     STG 2 Progress  Progressing  -SI        Long Term Goals    LTG 1  pt. to be I with advanced HEP to facilitate self management of their condition  -SI     LTG 1 Progress  Progressing  -SI     LTG 3  pt to report >/= 50% in ability to sadie/doff shoes/socks to facilitate ease with self care/dressing  -SI     LTG 3 Progress  Met  -SI     LTG 4  pt. to ambulate with near normal heel to toe gait pattern with SC to facilitate ease/safety with community mobility  -SI     LTG 4 Progress  Ongoing  -SI       User Key  (r) = Recorded By, (t) = Taken By, (c) = Cosigned By    Initials Name Provider Type    Mica Sanderson PTA Physical Therapy Assistant          Therapy Education  Given: HEP, Symptoms/condition management(fall prevention and home safety emphasized.)  Program: Reinforced  How Provided: Verbal, Demonstration, Written  Provided to: Patient  Level of Understanding: Teach back education performed              Time Calculation:   Start Time: 1630  Stop Time: 1710  Time Calculation (min): 40 min  Total Timed Code Minutes- PT: 40 minute(s)  Therapy Charges for Today     Code Description Service Date Service Provider Modifiers Qty    72090268271 HC PT THER PROC EA 15 MIN 10/22/2019 Mica Corrigan PTA GP 3                    Mica Corrigan PTA  10/22/2019

## 2019-10-24 NOTE — THERAPY TREATMENT NOTE
Outpatient Physical Therapy Ortho Treatment Note  [facility]     Patient Name: Nicholas Lino  : 1928  MRN: [id number]  Today's Date: 10/24/2019      Visit Date: 10/24/2019    Visit Dx:    ICD-10-CM ICD-9-CM   1. Right hip pain M25.551 719.45       Patient Active Problem List   Diagnosis   • Coronary artery disease involving native coronary artery of native heart without angina pectoris   • AA (aortic aneurysm) (CMS/HCC)   • HLD (hyperlipidemia)   • History of coronary artery stent placement   • Hypertension        Past Medical History:   Diagnosis Date   • Aortic aneurysm (CMS/HCC)    • CAD (coronary artery disease)    • Hyperlipidemia    • Hypertension         Past Surgical History:   Procedure Laterality Date   • APPENDECTOMY     • ARTERIAL ANEURYSM REPAIR     • CARDIAC CATHETERIZATION     • CORONARY ANGIOPLASTY WITH STENT PLACEMENT     • COSMETIC SURGERY     • FEMORAL ARTERY REPAIR     • MOLE REMOVAL         PT Ortho     Row Name 10/24/19 1700       Subjective Comments    Subjective Comments  Pt states he has had a good week. He feels his mobility is betterbut still with right grin and lateral hip pain with WB'ing phase of gait.  -SI       Subjective Pain    Able to rate subjective pain?  yes  -SI       Posture/Observations    Observations  Edema RLE greater than left and elevtes BID  -SI       General ROM    GENERAL ROM COMMENTS  decrease right hiip ROM all dirctions compared to his left.  -SI       Balance Skills Training    24892 -  PT Neuromuscular Reeducation Minutes  10  -SI    Gait Balance Activities  backwards;side-stepping high knee march in paarallel bars  -SI       Gait/Stairs Assessment/Training    Handrail Location (Stairs)  right side (ascending)  -SI    Number of Steps (Stairs)  5  -SI    Ascending Technique (Stairs)  step-over-step  -SI    Descending Technique (Stairs)  step-over-step  -SI    Comment (Gait/Stairs)  Increase antalgia if ambbulates without cane  -SI    Row Name  10/22/19 1600       Subjective Comments    Subjective Comments  Chipped and put with sone over weekend and did well.  Min hip or groing pain.  -SI       Subjective Pain    Able to rate subjective pain?  yes  -SI       Posture/Observations    Observations  Edema right lower leg (didn't elevate today and increase edema)  -SI       MMT Right Lower Ext    Rt Hip Extension MMT, Gross Movement  (3/5) fair  -SI    Rt Hip ABduction MMT, Gross Movement  (3/5) fair  -SI       MMT Left Lower Ext    Lt Hip Extension MMT, Gross Movement  (4/5) good  -SI    Lt Hip ABduction MMT, Gross Movement  (4/5) good  -SI      User Key  (r) = Recorded By, (t) = Taken By, (c) = Cosigned By    Initials Name Provider Type    Mica Sanderson PTA Physical Therapy Assistant                      PT Assessment/Plan     Row Name 10/24/19 1730          PT Assessment    Assessment Comments  pt feels he is doing better.  He doesn't feel secure to play 9 holes of golf and agree with him.  He is looking forward to consult with dr. Reeves re OA right hip.  -SI       User Key  (r) = Recorded By, (t) = Taken By, (c) = Cosigned By    Initials Name Provider Type    Mica Sanderson PTA Physical Therapy Assistant            OP Exercises     Row Name 10/24/19 1700             Subjective Comments    Subjective Comments  Pt states he has had a good week. He feels his mobility is betterbut still with right grin and lateral hip pain with WB'ing phase of gait.  -SI         Subjective Pain    Able to rate subjective pain?  yes  -SI         Total Minutes    69706 - PT Therapeutic Exercise Minutes  35  -SI      50274 -  PT Neuromuscular Reeducation Minutes  10  -SI         Exercise 1    Exercise Name 1  supine hL hip abd  -SI      Cueing 1  Verbal;Demo  -SI      Reps 1  15  -SI      Time 1  3 s  -SI      Additional Comments  RTB  -SI         Exercise 2    Exercise Name 2  supine HL Hip add  -SI      Cueing 2  Verbal;Demo  -SI      Reps 2  15  -SI      Time 2  3 s   -SI         Exercise 3    Exercise Name 3  Gastroc stretch with strap  -SI      Reps 3  3  -SI      Time 3  20  -SI         Exercise 4    Exercise Name 4  1/2 trunk rotation with TA  -SI      Reps 4  10  -SI         Exercise 5    Exercise Name 5  hip bridge  -SI      Reps 5  15  -SI         Exercise 6    Exercise Name 6  LAQ and SAQ  -SI      Reps 6  15  -SI      Additional Comments  2lbs  -SI         Exercise 7    Exercise Name 7  HS stretch sit side mat  -SI      Reps 7  5  -SI      Time 7  20  -SI         Exercise 8    Exercise Name 8  heel lift in parallel bars  -SI      Reps 8  15  -SI         Exercise 9    Exercise Name 9  High knee march in parallel bars  -SI      Reps 9  2 laps  -SI         Exercise 10    Exercise Name 10  side stepping in parallel bars  -SI      Reps 10  2 laps  -SI         Exercise 11    Exercise Name 11     -SI      Reps 11     -SI         Exercise 12    Exercise Name 12  Nustep  -SI      Cueing 12  Verbal  -SI      Time 12  5 min  -SI         Exercise 13    Exercise Name 13  supine heel slides with plastic  -SI      Reps 13  10  -SI      Time 13  10  -SI        User Key  (r) = Recorded By, (t) = Taken By, (c) = Cosigned By    Initials Name Provider Type    SI Mica Corrigan, PTA Physical Therapy Assistant                       PT OP Goals     Row Name 10/24/19 1700          PT Short Term Goals    STG 1  pt. to be I with initial HEP to facilitate self management of their condition  -SI     STG 1 Progress  Met  -SI     STG 2  pt. to be educated in/verbalize understanding of the importance of posture/ergonomics in association with their condition to facilitate self management of their condition  -SI     STG 2 Progress  Met  -SI        Long Term Goals    LTG 1  pt. to be I with advanced HEP to facilitate self management of their condition  -SI     LTG 1 Progress  Met as long as has written copy  -SI     LTG 3  pt to report >/= 50% in ability to sadie/doff shoes/socks to facilitate ease with  self care/dressing  -SI     LTG 3 Progress  Met  -SI     LTG 4  pt. to ambulate with near normal heel to toe gait pattern with SC to facilitate ease/safety with community mobility  -SI     LTG 4 Progress  Ongoing step to gait due to pain right  -SI       User Key  (r) = Recorded By, (t) = Taken By, (c) = Cosigned By    Initials Name Provider Type    Mica Sanderson PTA Physical Therapy Assistant          Therapy Education  Given: HEP, Symptoms/condition management, Edema management  Program: Reinforced  How Provided: Verbal, Demonstration, Written  Provided to: Patient  Level of Understanding: Teach back education performed              Time Calculation:   Start Time: 1655  Stop Time: 1735  Time Calculation (min): 40 min  Total Timed Code Minutes- PT: 40 minute(s)  Therapy Charges for Today     Code Description Service Date Service Provider Modifiers Qty    10535434357 HC PT NEUROMUSC RE EDUCATION EA 15 MIN 10/24/2019 Mica Corrigan PTA GP 1    91653708800 HC PT THER PROC EA 15 MIN 10/24/2019 Mica Corrigan PTA GP 2                    Mica Corrigan PTA  10/24/2019

## 2019-10-29 NOTE — THERAPY DISCHARGE NOTE
"     Outpatient Physical Therapy Ortho Treatment Note/Discharge Summary  [facility]     Patient Name: Nicholas Lino  : 1928  MRN: [id number]  Today's Date: 10/29/2019      Visit Date: 10/29/2019    Visit Dx:    ICD-10-CM ICD-9-CM   1. Right hip pain M25.551 719.45       Patient Active Problem List   Diagnosis   • Coronary artery disease involving native coronary artery of native heart without angina pectoris   • AA (aortic aneurysm) (CMS/HCC)   • HLD (hyperlipidemia)   • History of coronary artery stent placement   • Hypertension        Past Medical History:   Diagnosis Date   • Aortic aneurysm (CMS/HCC)    • CAD (coronary artery disease)    • Hyperlipidemia    • Hypertension         Past Surgical History:   Procedure Laterality Date   • APPENDECTOMY     • ARTERIAL ANEURYSM REPAIR     • CARDIAC CATHETERIZATION     • CORONARY ANGIOPLASTY WITH STENT PLACEMENT     • COSMETIC SURGERY     • FEMORAL ARTERY REPAIR     • MOLE REMOVAL                                 OP Exercises     Row Name 10/29/19 1600             Subjective Comments    Subjective Comments  Pt states he has been able to wlk some around the house without jhis cane and has been feeling good, still uses the cane when he is outside the home.  -RS         Subjective Pain    Able to rate subjective pain?  yes  -RS      Pre-Treatment Pain Level  0  -RS      Subjective Pain Comment  \"I'm aware of it\"  -RS         Total Minutes    55738 - PT Therapeutic Exercise Minutes  30  -RS      62020 -  PT Neuromuscular Reeducation Minutes  10  -RS         Exercise 1    Exercise Name 1  supine hL hip abd  -RS      Cueing 1  Verbal;Demo  -RS      Reps 1  15  -RS      Time 1  3 s  -RS      Additional Comments  RTB  -RS         Exercise 2    Exercise Name 2  supine HL Hip add  -RS      Cueing 2  Verbal;Demo  -RS      Reps 2  15  -RS      Time 2  3 s  -RS         Exercise 3    Exercise Name 3  Gastroc stretch with strap  -RS      Reps 3  3  -RS      Time 3  20  -RS   "       Exercise 4    Exercise Name 4  1/2 trunk rotation with TA  -RS      Reps 4  10  -RS         Exercise 5    Exercise Name 5  hip bridge  -RS      Reps 5  15  -RS         Exercise 6    Exercise Name 6  LAQ   -RS      Reps 6  15  -RS      Additional Comments  2#  -RS         Exercise 7    Exercise Name 7  HS stretch sit side mat  -RS      Reps 7  5  -RS      Time 7  20  -RS         Exercise 8    Exercise Name 8  heel lift in parallel bars  -RS      Reps 8  15  -RS         Exercise 9    Exercise Name 9  High knee march in parallel bars  -RS      Reps 9  2 laps  -RS         Exercise 10    Exercise Name 10  side stepping in parallel bars  -RS      Reps 10  2 laps  -RS         Exercise 11    Exercise Name 11     -RS      Reps 11     -RS         Exercise 12    Exercise Name 12  Nustep  -RS      Cueing 12  Verbal  -RS      Time 12  5 min  -RS         Exercise 13    Exercise Name 13  supine heel slides with plastic  -RS      Reps 13  10  -RS      Time 13  10  -RS        User Key  (r) = Recorded By, (t) = Taken By, (c) = Cosigned By    Initials Name Provider Type    RS Sarah Stafford, PT Physical Therapist                         PT OP Goals     Row Name 10/29/19 1600          PT Short Term Goals    STG 1  pt. to be I with initial HEP to facilitate self management of their condition  -RS     STG 1 Progress  Met  -RS     STG 2  pt. to be educated in/verbalize understanding of the importance of posture/ergonomics in association with their condition to facilitate self management of their condition  -RS     STG 2 Progress  Met  -RS        Long Term Goals    LTG 1  pt. to be I with advanced HEP to facilitate self management of their condition  -RS     LTG 1 Progress  Met as long as has written copy  -RS     LTG 2  pt. to report an LEFS >/= 60% to demonstrate decreased level of perceived disability  -RS     LTG 2 Progress  Met  -RS     LTG 2 Progress Comments  scores 61% LEFS  -RS     LTG 3  pt to report >/= 50% in ability  to sadie/doff shoes/socks to facilitate ease with self care/dressing  -RS     LTG 3 Progress  Met  -RS     LTG 4  pt. to ambulate with near normal heel to toe gait pattern with SC to facilitate ease/safety with community mobility  -RS     LTG 4 Progress  Partially Met  -RS     LTG 4 Progress Comments  shorter step length with LLE due to pain with R stance, improved since eval however L foot slightly passes R foot during gait.  -RS       User Key  (r) = Recorded By, (t) = Taken By, (c) = Cosigned By    Initials Name Provider Type    RS Sarah Stafford, PT Physical Therapist               Outcome Measure Options: Lower Extremity Functional Scale (LEFS)  Lower Extremity Functional Index  Any of your usual work, housework or school activities: A little bit of difficulty  Your usual hobbies, recreational or sporting activities: No difficulty  Getting into or out of the bath: No difficulty  Walking between rooms: A little bit of difficulty  Putting on your shoes or socks: A little bit of difficulty  Squatting: No difficulty  Lifting an object, like a bag of groceries from the floor: No difficulty  Performing light activities around your home: No difficulty  Performing heavy activities around your home: Moderate difficulty  Getting into or out of a car: A little bit of difficulty  Walking 2 blocks: Quite a bit of difficulty  Walking a mile: Extreme difficulty or unable to perform activity  Going up or down 10 stairs (about 1 flight of stairs): No difficulty  Standing for 1 hour: Moderate difficulty  Sitting for 1 hour: No difficulty  Running on even ground: Extreme difficulty or unable to perform activity  Running on uneven ground: Extreme difficulty or unable to perform activity  Making sharp turns while running fast: Extreme difficulty or unable to perform activity  Hopping: Extreme difficulty or unable to perform activity  Rolling over in bed: No difficulty  Total: 49      Time Calculation:   Start Time: 1700  Stop  Time: 1745  Time Calculation (min): 45 min  Therapy Charges for Today     Code Description Service Date Service Provider Modifiers Qty    11987601796 HC PT NEUROMUSC RE EDUCATION EA 15 MIN 10/29/2019 Sarah Stafford, PT GP 1    [id number] HC PT THER PROC EA 15 MIN 10/29/2019 Sarah Stafford, PT GP 2          PT G-Codes  Outcome Measure Options: Lower Extremity Functional Scale (LEFS)  Total: 49     OP PT Discharge Summary  Date of Discharge: 10/29/19  Reason for Discharge: All goals achieved  Outcomes Achieved: Patient able to partially acheive established goals  Discharge Destination: Home with home program  Discharge Instructions/Additional Comments: The pt i has been seen in skilled PT for R hip pain, he demonstrates improved R hip strength, WB tolerance, brandon, and gait pattern. He ambulates short distances without SPC in house, recommended continunes to use SPC if has increased pain or is outside his home. Overall, he has met or partially met all goals and is appropriate d agreeable to DC this date.  He scores 61% ability on the LEFS, compared to 42% at [facility].      Sarah Stafford, PT  10/29/2019

## 2019-10-31 NOTE — PROGRESS NOTES
Patient: Nicholas Lino  YOB: 1928 89 y.o. male  Medical Record Number: [id number]     Chief Complaints:   Chief Complaint   Patient presents with   • Right Hip - Establish Care, Pain       History of Present Illness:Nicholas Lino is a 89 y.o. male who presents with right hip pain he has a severe stabbing pain within the hip that is worsened over the last 3 months.  He describes severe intermittent aching worse with walking or any form of activity.  He has been very active throughout his life he has played golf up until recently and now is unable to play golf due to hip pain.  He is done physical therapy and uses a cane but despite this the hip is limiting him in his basic activities of daily living.  He was sent by Dr. Lashon Ruffin for consultation.    Allergies:   Allergies   Allergen Reactions   • Clindamycin/Lincomycin    • Keflex [Cephalexin]        Medications:   Current Outpatient Medications   Medication Sig Dispense Refill   • aspirin 81 MG chewable tablet Chew 81 mg daily.     • calcium citrate-vitamin d (CITRACAL) 200-250 MG-UNIT tablet tablet Take 1 tablet by mouth Daily.     • hydroCHLOROthiazide (MICROZIDE) 12.5 MG capsule Take 1 capsule by mouth Daily. 90 capsule 2   • HYDROcodone-acetaminophen (NORCO) 5-325 MG per tablet Take 1 tablet by mouth Every 6 (Six) Hours As Needed.     • lisinopril (PRINIVIL,ZESTRIL) 40 MG tablet Take 40 mg by mouth daily.     • Loratadine 10 MG capsule Take  by mouth.     • metoprolol tartrate (LOPRESSOR) 25 MG tablet Take 25 mg by mouth 2 (two) times a day.     • MULTIPLE VITAMINS PO Take 1 tablet by mouth Daily.     • Potassium 99 MG tablet Take 1 tablet by mouth Daily.     • simvastatin (ZOCOR) 80 MG tablet Take 80 mg by mouth every night.     • vitamin B-12 (CYANOCOBALAMIN) 1000 MCG tablet Take 1,000 mcg by mouth Daily.     • vitamin C (ASCORBIC ACID) 250 MG tablet Take 1,000 mg by mouth daily.       No current facility-administered medications for this  "visit.          The following portions of the patient's history were reviewed and updated as appropriate: allergies, current medications, past family history, past medical history, past social history, past surgical history and problem list.    Review of Systems:   A 14 point review of systems was performed. All systems negative except pertinent positives/negative listed in HPI above    Physical Exam:   Vitals:    10/31/19 1323   Temp: 97.8 °F (36.6 °C)   TempSrc: Temporal   Weight: 80.4 kg (177 lb 3.2 oz)   Height: 170.2 cm (67\")       General: A and O x 3, ASA, NAD    SCLERA:    Normal    DENTITION:   Normal  Hip:  right    LEG ALIGNMENT:     Neutral        LEG LENGTH DISCREPANCY   :    none    GAIT:     Antalgic    SKIN:     No abnormality    RANGE OF MOTION:     Limited by joint irritability    STRENGTH:     Limited by joint irratibility    DISTAL PULSES:    Paplable    DISTAL SENSATION :   Intact    LYMPHATICS:     No   lymphadenopathy    OTHER:          +   Stinchfeld test      -    log roll      -   Tenderness to palpation trochanteric bursa        Radiology:  Xrays 2views right hip (AP bilateral hips, and lateral of the hip) taken at the hospital were reviewed  demonstrating  advanced, end-satge osteoarthritis with bone on bone articluation, periarticular osteophytes, and subchondral cysts. There are no previous films for comparision.    Assessment/Plan: Severe right hip end-stage osteoarthritis.  He is a very active 89-year-old and his hip is limiting him.  I feel he would respond favorably to hip replacement.  He does understand a hip replacement would carry with it more significant risk than with the average patient.  I have asked that he discuss surgery with Dr. Ruffin and if she is in agreement we will proceed as planned.  I would plan on using spinal anesthesia rather than general.  Given his stiffness and relative osteopenia I would plan on a posterior rather than anterior approach.  Continuation of " conservative management vs. YEISON discussed.  The patient wishes to proceed with total hip replacement.  At this point the patient has failed the full gamut of conservative treatment and stating complete understanding of the risks/benefits/ anternatives wishes to proceed with surgical treatment.    Risk and benefits of surgery were reviewed.  Including, but not limited to, blood clots, anesthesia risk, infection, leg length discrepancy, fracture, skin/leg numbness, failure of the implant, need for future surgeries, continued pain, hematoma, need for transfusion, and death, among others.  The patient understands and wishes to proceed.     The spectrum of treatment options were discussed with the patient in detail including both the nonoperative and operative treatment modalities and their respective risks and benefits.  After thorough discussion, the patient has elected to undergo surgical treatment.  The details of the surgical procedure were explained including the location of probable incisions and a description of the likely implants to be used.  Models and diagrams were used as educational resources. The patient understands the likely convalescence after surgery, as well as the rehabilitation required.  We thoroughly discussed the risks, benefits, and alternatives to surgery.  The risks include but are not limited to the risk of infection, joint stiffness, blood clots (including DVT and/or pulmonary embolus along with the risk of death), neurologic and/or vascular injury, fracture, dislocation, nonunion, malunion, need for further surgery including hardware failure requiring revision, and continued pain.  It was explained that if tissue has been repaired or reconstructed, there is also a chance of failure which may require further management.  Following the completion of the discussion, the patient expressed understanding of this planned course of care, all their questions were answered and consent will be  obtained preoperatively.    Operative Plan: Posterior approach Total Hip Replacement a 1-2 day staywith home health rehab however I did explain if he were not recovering appropriately we would consider rehab stay in which case I would recommend management at home.  He will see Dr. Ruffin for medical clearance.          Leroy Reeves MD  10/31/2019

## 2019-11-05 PROBLEM — M16.11 PRIMARY OSTEOARTHRITIS OF RIGHT HIP: Status: ACTIVE | Noted: 2019-01-01

## 2019-12-16 NOTE — PROGRESS NOTES
"Pharmacy Note - Penicillin Allergy Review    Nicholas Lino is a 89 y.o. male who has a documented allergy of Keflex. Pharmacy has thoroughly reviewed the patient's allergy and past antibiotic tolerability to streamline antibiotic prophylaxis prior to hip arthroplasty.    Beta-lactam antibiotic allergy and date reported in Epic: Keflex (7/21/16) with no reaction documented.    Patient has tolerated the following beta-lactam antibiotics previously per Epic review (with dates): None    Interview of Allergy History:    What was the suspected medication name and route? Keflex, oral    When was the reaction? About 10 years ago    How soon after taking the antibiotic did the reaction occur? Unknown    What symptoms were experienced during the reaction? Small rash, \"nothing concerning\"    Were you hospitalized or have to go to the emergency department? No    Did you have to receive a medication for treatment following antibiotic administration? No    What antibiotics have you tolerated in the past? Unknown of other antibiotics, haven't had any in so long.      Based on the results of this review, it is recommended for this patient to receive cefazolin as surgical site infection prophylaxis for upcoming hip/knee surgery.     Please call with any questions.    Thank you,  Priya Ordaz, PharmD  [phone]  "

## 2019-12-19 NOTE — DISCHARGE INSTRUCTIONS
Take the following medications the morning of surgery:    metoprolol    General Instructions:  • Do not eat solid food after midnight the night before surgery.  • You may drink clear liquids day of surgery but must stop at least one hour before your hospital arrival time.  • It is beneficial for you to have a clear drink that contains carbohydrates the day of surgery.  We suggest a 12 to 20 ounce bottle of Gatorade or Powerade for non-diabetic patients or a 12 to 20 ounce bottle of G2 or Powerade Zero for diabetic patients. (Pediatric patients, are not advised to drink a 12 to 20 ounce carbohydrate drink)    Clear liquids are liquids you can see through.  Nothing red in color.     Plain water                               Sports drinks  Sodas                                   Gelatin (Jell-O)  Fruit juices without pulp such as white grape juice and apple juice  Popsicles that contain no fruit or yogurt  Tea or coffee (no cream or milk added)  Gatorade / Powerade  G2 / Powerade Zero    • Infants may have breast milk up to four hours before surgery.  • Infants drinking formula may drink formula up to six hours before surgery.   • Patients who avoid smoking, chewing tobacco and alcohol for 4 weeks prior to surgery have a reduced risk of post-operative complications.  Quit smoking as many days before surgery as you can.  • Do not smoke, use chewing tobacco or drink alcohol the day of surgery.   • If applicable bring your C-PAP/ BI-PAP machine.  • Bring any papers given to you in the doctor’s office.  • Wear clean comfortable clothes.  • Do not wear contact lenses, false eyelashes or make-up.  Bring a case for your glasses.   • Bring crutches or walker if applicable.  • Remove all piercings.  Leave jewelry and any other valuables at home.  • Hair extensions with metal clips must be removed prior to surgery.  • The Pre-Admission Testing nurse will instruct you to bring medications if unable to obtain an accurate list in  Pre-Admission Testing.        If you were given a blood bank ID arm band remember to bring it with you the day of surgery.    Preventing a Surgical Site Infection:  • For 2 to 3 days before surgery, avoid shaving with a razor because the razor can irritate skin and make it easier to develop an infection.    • Any areas of open skin can increase the risk of a post-operative wound infection by allowing bacteria to enter and travel throughout the body.  Notify your surgeon if you have any skin wounds / rashes even if it is not near the expected surgical site.  The area will need assessed to determine if surgery should be delayed until it is healed.  • The night prior to surgery sleep in a clean bed with clean clothing.  Do not allow pets to sleep with you.  • Shower on the morning of surgery using a fresh bar of anti-bacterial soap (such as Dial) and clean washcloth.  Dry with a clean towel and dress in clean clothing.  • Ask your surgeon if you will be receiving antibiotics prior to surgery.  • Make sure you, your family, and all healthcare providers clean their hands with soap and water or an alcohol based hand  before caring for you or your wound.    Day of surgery:1/6/20   1200  Your arrival time is approximately two hours before your scheduled surgery time.  Upon arrival, a Pre-op nurse and Anesthesiologist will review your health history, obtain vital signs, and answer questions you may have.  The only belongings needed at this time will be a list of your home medications and if applicable your C-PAP/BI-PAP machine.  If you are staying overnight your family can leave the rest of your belongings in the car and bring them to your room later.  A Pre-op nurse will start an IV and you may receive medication in preparation for surgery, including something to help you relax.  Your family will be able to see you in the Pre-op area.  Two visitors at a time will be allowed in the Pre-op room.  While you are in  surgery your family should notify the waiting room  if they leave the waiting room area and provide a contact phone number.    Please be aware that surgery does come with discomfort.  We want to make every effort to control your discomfort so please discuss any uncontrolled symptoms with your nurse.   Your doctor will most likely have prescribed pain medications.      If you are going home after surgery you will receive individualized written care instructions before being discharged.  A responsible adult must drive you to and from the hospital on the day of your surgery and stay with you for 24 hours.    If you are staying overnight following surgery, you will be transported to your hospital room following the recovery period.  [facility] has all private rooms.    If you have any questions please call Pre-Admission Testing at [phone].  Deductibles and co-payments are collected on the day of service. Please be prepared to pay the required co-pay, deductible or deposit on the day of service as defined by your plan.  2% CHLORHEXIDINE GLUCONATE* CLOTH  Preparing or “prepping” skin before surgery can reduce the risk of infection at the surgical site. To make the process easier, [facility] has chosen disposable cloths moistened with a rinse-free, 2% Chlorhexidine Gluconate (CHG) antiseptic solution. The steps below outline the prepping process and should be carefully followed.        Use the prep cloth on the area that is circled in the diagram             Directions Night before Surgery  1) Shower using a fresh bar of anti-bacterial soap (such as Dial) and clean washcloth.  Use a clean towel to completely dry your skin.  2) Do not use any lotions, oils or creams on your skin.  3) Open the package and remove 1 cloth, wipe your skin for 30 seconds in a circular motion.  Allow to dry for 3 minutes.  4) Repeat #3 with second cloth.  5) Do not touch your eyes, ears, or mouth with  the prep cloth.  6) Allow the wet prep solution to air dry.  7) Discard the prep cloth and wash your hands with soap and water.   8) Dress in clean bed clothes and sleep on fresh clean bed sheets.   9) You may experience some temporary itching after the prep.    Directions Day of Surgery  1) Repeat steps 1,2,3,4,5,6,7, and 9.   2) Dress in clean clothes before coming to the hospital.    BACTROBAN NASAL OINTMENT  There are many germs normally in your nose. Bactroban is an ointment that will help reduce these germs. Please follow these instructions for Bactroban use:      __1__The day before surgery in the morning  Date_1/5/20_______    __2__The day before surgery in the evening              Date_1/5/20_____    _3___The day of surgery in the morning    Date__1/6/20______    **Squirt ½ package of Bactroban Ointment onto a cotton applicator and apply to inside of 1st nostril.  Squirt the remaining Bactroban and apply to the inside of the other nostril.    PERIDEX- ORAL:  Use only if your surgeon has ordered  Use the night before and morning of surgery - Swish, gargle, and spit - do not swallow.

## 2019-12-23 NOTE — H&P
Patient: Nicholas Lino    YOB: 1928    Medical Record Number: [id number]    Admitting Physician: Dr. Leroy Reeves    Reason for Admission: End Stage Right Hip OA    History of Present Illness: 89 y.o. male presents with severe end stage hip osteoarthritis which has not been responsive to the full compliment of conservative measures. Despite conservative attempts, there is still severe, constant activity limiting hip pain. Given the severity of the pain, the patient has elected to proceed with hip replacement.    Allergies:   Allergies   Allergen Reactions   • Clindamycin/Lincomycin Rash   • Keflex [Cephalexin] Rash     Per patient interview in December 2019, very mild rash.         Current Medications:  Home Medications:    Current Outpatient Medications on File Prior to Visit   Medication Sig   • acetaminophen (TYLENOL) 500 MG tablet Take 1,000 mg by mouth Every 6 (Six) Hours As Needed for Mild Pain .   • ascorbic acid (VITAMIN C) 1000 MG tablet Take 1,000 mg by mouth Daily.   • aspirin 81 MG chewable tablet Chew 81 mg Daily. To stop 1 week before surgery   • Calcium Carb-Cholecalciferol (CALCIUM 600 + D PO) Take 1 tablet by mouth 2 (Two) Times a Day.   • Chlorhexidine Gluconate Cloth 2 % pads Apply 1 application topically. USE AS DIRECTED PREOP   • hydroCHLOROthiazide (MICROZIDE) 12.5 MG capsule Take 1 capsule by mouth Daily.   • lisinopril (PRINIVIL,ZESTRIL) 40 MG tablet Take 40 mg by mouth daily.   • Loratadine 10 MG capsule Take 10 mg by mouth Daily.   • metoprolol tartrate (LOPRESSOR) 25 MG tablet Take 25 mg by mouth 2 (two) times a day.   • MULTIPLE VITAMINS PO Take 1 tablet by mouth Daily.   • mupirocin (BACTROBAN) 2 % nasal ointment 1 application into the nostril(s) as directed by provider. USE AS DIRECTED PREOP   • Potassium 99 MG tablet Take 1 tablet by mouth Daily.   • Probiotic Product (PROBIOTIC DAILY PO) Take 1 capsule by mouth Daily.   • simvastatin (ZOCOR) 80 MG tablet Take 80 mg by  "mouth every night.   • vitamin B-12 (CYANOCOBALAMIN) 1000 MCG tablet Take 1,000 mcg by mouth Daily.     No current facility-administered medications on file prior to visit.      PRN Meds:.    PMH:  Past Medical History:   Diagnosis Date   • Aortic aneurysm (CMS/HCC)    • Arthritis    • CAD (coronary artery disease)    • Cancer (CMS/HCC)     skin cancer   • Hip pain    • Hyperlipidemia    • Hypertension         PSURGH:  Past Surgical History:   Procedure Laterality Date   • APPENDECTOMY     • ARTERIAL ANEURYSM REPAIR     • CARDIAC CATHETERIZATION     • COLONOSCOPY     • CORONARY ANGIOPLASTY WITH STENT PLACEMENT     • COSMETIC SURGERY     • FEMORAL ARTERY REPAIR     • MOLE REMOVAL         SocialHx:  Social History     Occupational History   • Not on file   Tobacco Use   • Smoking status: Never Smoker   • Smokeless tobacco: Never Used   Substance and Sexual Activity   • Alcohol use: No   • Drug use: No   • Sexual activity: Not on file      Social History     Social History Narrative   • Not on file       FamHx:  Family History   Problem Relation Age of Onset   • Heart disease Father    • Stroke Father    • No Known Problems Mother    • No Known Problems Maternal Grandmother    • No Known Problems Maternal Grandfather    • No Known Problems Paternal Grandmother    • No Known Problems Paternal Grandfather    • Malig Hyperthermia Neg Hx          Review of Systems:   A 14 point review of systems was performed, pertinent positives discussed above, all other systems are negative    Physical Exam: 89 y.o. male  Vital Signs :   Vitals:    12/23/19 0855   BP: 130/62   Temp: 98.6 °F (37 °C)   TempSrc: Temporal   Weight: 79.5 kg (175 lb 3.2 oz)   Height: 167.6 cm (66\")   PainSc:   4   PainLoc: Hip     General: Alert and Oriented x 3, No acute distress.  Psych: mood and affect appropriate; recent and remote memory intact  Eyes: conjunctiva clear; pupils equally round and reactive, sclera nonicteric  CV: no peripheral edema  Resp: " normal respiratory effort  Skin: no rashes or wounds; normal turgor  Musculosketetal; pain with hip range of motion. Positive stinchfeld test. No trochanteric  Tenderness.  Vascular: palpable distal pulses    Labs:    Appointment on 12/19/2019   Component Date Value Ref Range Status   • Glucose 12/19/2019 73  65 - 99 mg/dL Final   • BUN 12/19/2019 14  8 - 23 mg/dL Final   • Creatinine 12/19/2019 0.94  0.76 - 1.27 mg/dL Final   • Sodium 12/19/2019 140  136 - 145 mmol/L Final   • Potassium 12/19/2019 4.6  3.5 - 5.2 mmol/L Final   • Chloride 12/19/2019 99  98 - 107 mmol/L Final   • CO2 12/19/2019 30.1* 22.0 - 29.0 mmol/L Final   • Calcium 12/19/2019 10.3* 8.2 - 9.6 mg/dL Final   • eGFR Non African Amer 12/19/2019 75  >60 mL/min/1.73 Final   • BUN/Creatinine Ratio 12/19/2019 14.9  7.0 - 25.0 Final   • Anion Gap 12/19/2019 10.9  5.0 - 15.0 mmol/L Final   • WBC 12/19/2019 8.18  3.40 - 10.80 10*3/mm3 Final   • RBC 12/19/2019 4.77  4.14 - 5.80 10*6/mm3 Final   • Hemoglobin 12/19/2019 14.8  13.0 - 17.7 g/dL Final   • Hematocrit 12/19/2019 44.2  37.5 - 51.0 % Final   • MCV 12/19/2019 92.7  79.0 - 97.0 fL Final   • MCH 12/19/2019 31.0  26.6 - 33.0 pg Final   • MCHC 12/19/2019 33.5  31.5 - 35.7 g/dL Final   • RDW 12/19/2019 12.5  12.3 - 15.4 % Final   • RDW-SD 12/19/2019 43.3  37.0 - 54.0 fl Final   • MPV 12/19/2019 10.9  6.0 - 12.0 fL Final   • Platelets 12/19/2019 182  140 - 450 10*3/mm3 Final   • Color, UA 12/19/2019 Yellow  Yellow, Straw Final   • Appearance, UA 12/19/2019 Clear  Clear Final   • pH, UA 12/19/2019 7.0  5.0 - 8.0 Final   • Specific Gravity, UA 12/19/2019 1.016  1.005 - 1.030 Final   • Glucose, UA 12/19/2019 Negative  Negative Final   • Ketones, UA 12/19/2019 Negative  Negative Final   • Bilirubin, UA 12/19/2019 Negative  Negative Final   • Blood, UA 12/19/2019 Negative  Negative Final   • Protein, UA 12/19/2019 Negative  Negative Final   • Leuk Esterase, UA 12/19/2019 Trace* Negative Final   • Nitrite, UA  12/19/2019 Negative  Negative Final   • Urobilinogen, UA 12/19/2019 0.2 E.U./dL  0.2 - 1.0 E.U./dL Final   • RBC, UA 12/19/2019 0-2  None Seen, 0-2 /HPF Final   • WBC, UA 12/19/2019 3-5* None Seen, 0-2 /HPF Final   • Bacteria, UA 12/19/2019 None Seen  None Seen /HPF Final   • Squamous Epithelial Cells, UA 12/19/2019 0-2  None Seen, 0-2 /HPF Final   • Hyaline Casts, UA 12/19/2019 None Seen  None Seen /LPF Final   • Methodology 12/19/2019 Automated Microscopy   Final     Xrays:  Xrays AP pelvis and a lateral of the Right hip were ordered and reviewed demonstrating  End stage hip OA with bone on bone articulation, subchondral cysts and periarticular osteophytes.    Assessment:  End-stage Right hip osteoarthritis. Conservative measures have failed.      Plan:  The plan is to proceed with Right Total Hip Replacement. The patient voiced understanding of the risks, benefits, and alternative forms of treatment that were discussed with Dr Reeves at the time of scheduling. 1-2 HH.  Spinal anesthesia    Pearl Kitchen, APRN  12/23/2019

## 2020-01-01 ENCOUNTER — TELEPHONE (OUTPATIENT)
Dept: ORTHOPEDIC SURGERY | Facility: CLINIC | Age: 85
End: 2020-01-01

## 2020-01-01 ENCOUNTER — APPOINTMENT (OUTPATIENT)
Dept: GENERAL RADIOLOGY | Facility: HOSPITAL | Age: 85
End: 2020-01-01

## 2020-01-01 ENCOUNTER — HOSPITAL ENCOUNTER (OUTPATIENT)
Dept: PHYSICAL THERAPY | Facility: HOSPITAL | Age: 85
Setting detail: THERAPIES SERIES
Discharge: HOME | End: 2020-01-21

## 2020-01-01 ENCOUNTER — APPOINTMENT (OUTPATIENT)
Dept: MRI IMAGING | Facility: HOSPITAL | Age: 85
End: 2020-01-01

## 2020-01-01 ENCOUNTER — HOSPITAL ENCOUNTER (INPATIENT)
Facility: HOSPITAL | Age: 85
LOS: 3 days | Discharge: NURSING, REHAB OR LONG-TERM CARE | End: 2020-01-09
Attending: ORTHOPAEDIC SURGERY | Admitting: ORTHOPAEDIC SURGERY

## 2020-01-01 ENCOUNTER — HOSPITAL ENCOUNTER (OUTPATIENT)
Dept: PHYSICAL THERAPY | Facility: HOSPITAL | Age: 85
Setting detail: THERAPIES SERIES
Discharge: HOME | End: 2020-01-24

## 2020-01-01 ENCOUNTER — APPOINTMENT (OUTPATIENT)
Dept: CT IMAGING | Facility: HOSPITAL | Age: 85
End: 2020-01-01

## 2020-01-01 ENCOUNTER — HOSPITAL ENCOUNTER (INPATIENT)
Facility: HOSPITAL | Age: 85
LOS: 5 days | End: 2020-02-05
Attending: HOSPITALIST | Admitting: HOSPITALIST

## 2020-01-01 ENCOUNTER — HOSPITAL ENCOUNTER (INPATIENT)
Facility: HOSPITAL | Age: 85
LOS: 5 days | Discharge: HOSPICE | End: 2020-01-31
Attending: EMERGENCY MEDICINE | Admitting: INTERNAL MEDICINE

## 2020-01-01 ENCOUNTER — OFFICE VISIT (OUTPATIENT)
Dept: ORTHOPEDIC SURGERY | Facility: CLINIC | Age: 85
End: 2020-01-01

## 2020-01-01 ENCOUNTER — APPOINTMENT (OUTPATIENT)
Dept: CARDIOLOGY | Facility: HOSPITAL | Age: 85
End: 2020-01-01

## 2020-01-01 ENCOUNTER — ANESTHESIA EVENT (OUTPATIENT)
Dept: PERIOP | Facility: HOSPITAL | Age: 85
End: 2020-01-01

## 2020-01-01 ENCOUNTER — ANESTHESIA (OUTPATIENT)
Dept: PERIOP | Facility: HOSPITAL | Age: 85
End: 2020-01-01

## 2020-01-01 ENCOUNTER — TRANSCRIBE ORDERS (OUTPATIENT)
Dept: PHYSICAL THERAPY | Facility: HOSPITAL | Age: 85
End: 2020-01-01

## 2020-01-01 ENCOUNTER — APPOINTMENT (OUTPATIENT)
Dept: NEUROLOGY | Facility: HOSPITAL | Age: 85
End: 2020-01-01

## 2020-01-01 VITALS
SYSTOLIC BLOOD PRESSURE: 79 MMHG | HEART RATE: 90 BPM | WEIGHT: 157.19 LBS | DIASTOLIC BLOOD PRESSURE: 49 MMHG | OXYGEN SATURATION: 94 % | HEIGHT: 69 IN | RESPIRATION RATE: 18 BRPM | BODY MASS INDEX: 23.28 KG/M2 | TEMPERATURE: 97 F

## 2020-01-01 VITALS
HEIGHT: 66 IN | TEMPERATURE: 98.9 F | OXYGEN SATURATION: 96 % | DIASTOLIC BLOOD PRESSURE: 70 MMHG | HEART RATE: 92 BPM | WEIGHT: 175.04 LBS | RESPIRATION RATE: 16 BRPM | SYSTOLIC BLOOD PRESSURE: 112 MMHG | BODY MASS INDEX: 28.13 KG/M2

## 2020-01-01 VITALS — HEIGHT: 66 IN | TEMPERATURE: 97.3 F | BODY MASS INDEX: 28.12 KG/M2 | WEIGHT: 175 LBS

## 2020-01-01 VITALS — TEMPERATURE: 100 F | OXYGEN SATURATION: 79 % | SYSTOLIC BLOOD PRESSURE: 96 MMHG | DIASTOLIC BLOOD PRESSURE: 60 MMHG

## 2020-01-01 DIAGNOSIS — Z96.641 STATUS POST TOTAL HIP REPLACEMENT, RIGHT: Primary | ICD-10-CM

## 2020-01-01 DIAGNOSIS — N18.9 CHRONIC KIDNEY DISEASE, UNSPECIFIED CKD STAGE: ICD-10-CM

## 2020-01-01 DIAGNOSIS — R41.82 ALTERED MENTAL STATUS, UNSPECIFIED ALTERED MENTAL STATUS TYPE: Primary | ICD-10-CM

## 2020-01-01 DIAGNOSIS — D64.9 CHRONIC ANEMIA: ICD-10-CM

## 2020-01-01 DIAGNOSIS — Z98.890 STATUS POST HIP SURGERY: Primary | ICD-10-CM

## 2020-01-01 DIAGNOSIS — M25.551 PAIN OF RIGHT HIP JOINT: ICD-10-CM

## 2020-01-01 DIAGNOSIS — M16.11 PRIMARY OSTEOARTHRITIS OF RIGHT HIP: ICD-10-CM

## 2020-01-01 DIAGNOSIS — M79.89 RIGHT LEG SWELLING: ICD-10-CM

## 2020-01-01 DIAGNOSIS — R26.89 DECREASED MOBILITY: ICD-10-CM

## 2020-01-01 DIAGNOSIS — Z96.641 STATUS POST RIGHT HIP REPLACEMENT: Primary | ICD-10-CM

## 2020-01-01 DIAGNOSIS — R31.9 HEMATURIA, UNSPECIFIED TYPE: ICD-10-CM

## 2020-01-01 DIAGNOSIS — I48.91 NEW ONSET ATRIAL FIBRILLATION (HCC): ICD-10-CM

## 2020-01-01 LAB
ABO GROUP BLD: NORMAL
ALBUMIN SERPL-MCNC: 3.4 G/DL (ref 3.5–5.2)
ALBUMIN SERPL-MCNC: 3.8 G/DL (ref 3.5–5.2)
ALBUMIN/GLOB SERPL: 1.3 G/DL
ALBUMIN/GLOB SERPL: 1.4 G/DL
ALP SERPL-CCNC: 115 U/L (ref 39–117)
ALP SERPL-CCNC: 128 U/L (ref 39–117)
ALT SERPL W P-5'-P-CCNC: 11 U/L (ref 1–41)
ALT SERPL W P-5'-P-CCNC: 7 U/L (ref 1–41)
AMMONIA BLD-SCNC: 28 UMOL/L (ref 16–60)
ANION GAP SERPL CALCULATED.3IONS-SCNC: 14.1 MMOL/L (ref 5–15)
ANION GAP SERPL CALCULATED.3IONS-SCNC: 14.9 MMOL/L (ref 5–15)
ANION GAP SERPL CALCULATED.3IONS-SCNC: 16.8 MMOL/L (ref 5–15)
AORTIC DIMENSIONLESS INDEX: 0.7 (DI)
APTT PPP: 29.4 SECONDS (ref 22.7–35.4)
AST SERPL-CCNC: 18 U/L (ref 1–40)
AST SERPL-CCNC: 19 U/L (ref 1–40)
BACTERIA SPEC AEROBE CULT: NORMAL
BACTERIA SPEC AEROBE CULT: NORMAL
BACTERIA UR QL AUTO: ABNORMAL /HPF
BASOPHILS # BLD AUTO: 0.06 10*3/MM3 (ref 0–0.2)
BASOPHILS # BLD AUTO: 0.06 10*3/MM3 (ref 0–0.2)
BASOPHILS NFR BLD AUTO: 0.6 % (ref 0–1.5)
BASOPHILS NFR BLD AUTO: 0.8 % (ref 0–1.5)
BH CV ECHO MEAS - ACS: 2.1 CM
BH CV ECHO MEAS - AO MAX PG: 9 MMHG
BH CV ECHO MEAS - AO MEAN PG (FULL): 2 MMHG
BH CV ECHO MEAS - AO MEAN PG: 4 MMHG
BH CV ECHO MEAS - AO ROOT AREA (BSA CORRECTED): 1.4
BH CV ECHO MEAS - AO ROOT AREA: 5.7 CM^2
BH CV ECHO MEAS - AO ROOT DIAM: 2.7 CM
BH CV ECHO MEAS - AO V2 MAX: 149 CM/SEC
BH CV ECHO MEAS - AO V2 MEAN: 90.7 CM/SEC
BH CV ECHO MEAS - AO V2 VTI: 30.6 CM
BH CV ECHO MEAS - ASC AORTA: 3.7 CM
BH CV ECHO MEAS - AVA(I,A): 3 CM^2
BH CV ECHO MEAS - AVA(I,D): 3 CM^2
BH CV ECHO MEAS - BSA(HAYCOCK): 1.9 M^2
BH CV ECHO MEAS - BSA: 1.9 M^2
BH CV ECHO MEAS - BZI_BMI: 24.1 KILOGRAMS/M^2
BH CV ECHO MEAS - BZI_METRIC_HEIGHT: 175.3 CM
BH CV ECHO MEAS - BZI_METRIC_WEIGHT: 73.9 KG
BH CV ECHO MEAS - EDV(CUBED): 88.1 ML
BH CV ECHO MEAS - EDV(MOD-SP2): 112 ML
BH CV ECHO MEAS - EDV(MOD-SP4): 116 ML
BH CV ECHO MEAS - EDV(TEICH): 90.1 ML
BH CV ECHO MEAS - EF(CUBED): 80.1 %
BH CV ECHO MEAS - EF(MOD-BP): 73 %
BH CV ECHO MEAS - EF(MOD-SP2): 73.2 %
BH CV ECHO MEAS - EF(MOD-SP4): 65.5 %
BH CV ECHO MEAS - EF(TEICH): 72.7 %
BH CV ECHO MEAS - ESV(CUBED): 17.6 ML
BH CV ECHO MEAS - ESV(MOD-SP2): 30 ML
BH CV ECHO MEAS - ESV(MOD-SP4): 40 ML
BH CV ECHO MEAS - ESV(TEICH): 24.6 ML
BH CV ECHO MEAS - FS: 41.6 %
BH CV ECHO MEAS - IVS/LVPW: 1.6
BH CV ECHO MEAS - IVSD: 1.1 CM
BH CV ECHO MEAS - LAT PEAK E' VEL: 16 CM/SEC
BH CV ECHO MEAS - LV DIASTOLIC VOL/BSA (35-75): 61.3 ML/M^2
BH CV ECHO MEAS - LV MASS(C)D: 130.4 GRAMS
BH CV ECHO MEAS - LV MASS(C)DI: 68.9 GRAMS/M^2
BH CV ECHO MEAS - LV MAX PG: 4 MMHG
BH CV ECHO MEAS - LV MEAN PG: 2 MMHG
BH CV ECHO MEAS - LV SYSTOLIC VOL/BSA (12-30): 21.1 ML/M^2
BH CV ECHO MEAS - LV V1 MAX: 100 CM/SEC
BH CV ECHO MEAS - LV V1 MEAN: 65.1 CM/SEC
BH CV ECHO MEAS - LV V1 VTI: 21.9 CM
BH CV ECHO MEAS - LVIDD: 4.5 CM
BH CV ECHO MEAS - LVIDS: 2.6 CM
BH CV ECHO MEAS - LVLD AP2: 8.4 CM
BH CV ECHO MEAS - LVLD AP4: 9.3 CM
BH CV ECHO MEAS - LVLS AP2: 6.1 CM
BH CV ECHO MEAS - LVLS AP4: 8.1 CM
BH CV ECHO MEAS - LVOT AREA (M): 4.2 CM^2
BH CV ECHO MEAS - LVOT AREA: 4.2 CM^2
BH CV ECHO MEAS - LVOT DIAM: 2.3 CM
BH CV ECHO MEAS - LVPWD: 0.7 CM
BH CV ECHO MEAS - MED PEAK E' VEL: 8 CM/SEC
BH CV ECHO MEAS - MV A DUR: 0.17 SEC
BH CV ECHO MEAS - MV A MAX VEL: 71.6 CM/SEC
BH CV ECHO MEAS - MV DEC SLOPE: 253 CM/SEC^2
BH CV ECHO MEAS - MV DEC TIME: 246 SEC
BH CV ECHO MEAS - MV E MAX VEL: 73.5 CM/SEC
BH CV ECHO MEAS - MV E/A: 1
BH CV ECHO MEAS - MV MEAN PG: 2 MMHG
BH CV ECHO MEAS - MV P1/2T MAX VEL: 88.2 CM/SEC
BH CV ECHO MEAS - MV P1/2T: 102.1 MSEC
BH CV ECHO MEAS - MV V2 MEAN: 62.8 CM/SEC
BH CV ECHO MEAS - MV V2 VTI: 26.4 CM
BH CV ECHO MEAS - MVA P1/2T LCG: 2.5 CM^2
BH CV ECHO MEAS - MVA(P1/2T): 2.2 CM^2
BH CV ECHO MEAS - MVA(VTI): 3.4 CM^2
BH CV ECHO MEAS - PA ACC SLOPE: 1361 CM/SEC^2
BH CV ECHO MEAS - PA ACC TIME: 0.06 SEC
BH CV ECHO MEAS - PA MAX PG: 3.1 MMHG
BH CV ECHO MEAS - PA PR(ACCEL): 50.7 MMHG
BH CV ECHO MEAS - PA V2 MAX: 88.7 CM/SEC
BH CV ECHO MEAS - PULM A REVS DUR: 0.16 SEC
BH CV ECHO MEAS - PULM A REVS VEL: 28.6 CM/SEC
BH CV ECHO MEAS - PULM DIAS VEL: 54.8 CM/SEC
BH CV ECHO MEAS - PULM S/D: 1.2
BH CV ECHO MEAS - PULM SYS VEL: 67.6 CM/SEC
BH CV ECHO MEAS - QP/QS: 0.64
BH CV ECHO MEAS - RAP SYSTOLE: 8 MMHG
BH CV ECHO MEAS - RV MEAN PG: 1 MMHG
BH CV ECHO MEAS - RV V1 MEAN: 49.8 CM/SEC
BH CV ECHO MEAS - RV V1 VTI: 15.2 CM
BH CV ECHO MEAS - RVOT AREA: 3.8 CM^2
BH CV ECHO MEAS - RVOT DIAM: 2.2 CM
BH CV ECHO MEAS - RVSP: 35.9 MMHG
BH CV ECHO MEAS - SI(AO): 92.5 ML/M^2
BH CV ECHO MEAS - SI(CUBED): 37.3 ML/M^2
BH CV ECHO MEAS - SI(LVOT): 48 ML/M^2
BH CV ECHO MEAS - SI(MOD-SP2): 43.3 ML/M^2
BH CV ECHO MEAS - SI(MOD-SP4): 40.1 ML/M^2
BH CV ECHO MEAS - SI(TEICH): 34.6 ML/M^2
BH CV ECHO MEAS - SV(AO): 175.2 ML
BH CV ECHO MEAS - SV(CUBED): 70.5 ML
BH CV ECHO MEAS - SV(LVOT): 91 ML
BH CV ECHO MEAS - SV(MOD-SP2): 82 ML
BH CV ECHO MEAS - SV(MOD-SP4): 76 ML
BH CV ECHO MEAS - SV(RVOT): 57.8 ML
BH CV ECHO MEAS - SV(TEICH): 65.4 ML
BH CV ECHO MEAS - TAPSE (>1.6): 2 CM2
BH CV ECHO MEAS - TR MAX VEL: 264 CM/SEC
BH CV ECHO MEASUREMENTS AVERAGE E/E' RATIO: 6.13
BH CV LOWER VASCULAR RIGHT COMMON FEMORAL AUGMENT: NORMAL
BH CV LOWER VASCULAR RIGHT COMMON FEMORAL COMPETENT: NORMAL
BH CV LOWER VASCULAR RIGHT COMMON FEMORAL COMPRESS: NORMAL
BH CV LOWER VASCULAR RIGHT COMMON FEMORAL PHASIC: NORMAL
BH CV LOWER VASCULAR RIGHT COMMON FEMORAL SPONT: NORMAL
BH CV LOWER VASCULAR RIGHT DISTAL FEMORAL COMPRESS: NORMAL
BH CV LOWER VASCULAR RIGHT GREATER SAPH AK COMPRESS: NORMAL
BH CV LOWER VASCULAR RIGHT GREATER SAPH BK COMPRESS: NORMAL
BH CV LOWER VASCULAR RIGHT MID FEMORAL AUGMENT: NORMAL
BH CV LOWER VASCULAR RIGHT MID FEMORAL COMPETENT: NORMAL
BH CV LOWER VASCULAR RIGHT MID FEMORAL COMPRESS: NORMAL
BH CV LOWER VASCULAR RIGHT MID FEMORAL PHASIC: NORMAL
BH CV LOWER VASCULAR RIGHT MID FEMORAL SPONT: NORMAL
BH CV LOWER VASCULAR RIGHT PROXIMAL FEMORAL COMPRESS: NORMAL
BH CV LOWER VASCULAR RIGHT SAPHENOFEMORAL JUNCTION COMPRESS: NORMAL
BH CV VAS BP LEFT ARM: NORMAL MMHG
BH CV XLRA - RV BASE: 3.4 CM
BH CV XLRA - TDI S': 13 CM/SEC
BILIRUB SERPL-MCNC: 0.5 MG/DL (ref 0.2–1.2)
BILIRUB SERPL-MCNC: 0.5 MG/DL (ref 0.2–1.2)
BILIRUB UR QL STRIP: NEGATIVE
BILIRUB UR QL STRIP: NEGATIVE
BLD GP AB SCN SERPL QL: NEGATIVE
BUN BLD-MCNC: 18 MG/DL (ref 8–23)
BUN BLD-MCNC: 20 MG/DL (ref 8–23)
BUN BLD-MCNC: 23 MG/DL (ref 8–23)
BUN/CREAT SERPL: 17.4 (ref 7–25)
BUN/CREAT SERPL: 23.7 (ref 7–25)
BUN/CREAT SERPL: 24.7 (ref 7–25)
CALCIUM SPEC-SCNC: 8.9 MG/DL (ref 8.2–9.6)
CALCIUM SPEC-SCNC: 9 MG/DL (ref 8.2–9.6)
CALCIUM SPEC-SCNC: 9.5 MG/DL (ref 8.2–9.6)
CHLORIDE SERPL-SCNC: 102 MMOL/L (ref 98–107)
CHLORIDE SERPL-SCNC: 99 MMOL/L (ref 98–107)
CHLORIDE SERPL-SCNC: 99 MMOL/L (ref 98–107)
CHOLEST SERPL-MCNC: 100 MG/DL (ref 0–200)
CK SERPL-CCNC: 107 U/L (ref 20–200)
CLARITY UR: CLEAR
CLARITY UR: CLEAR
CO2 SERPL-SCNC: 25.1 MMOL/L (ref 22–29)
CO2 SERPL-SCNC: 25.9 MMOL/L (ref 22–29)
CO2 SERPL-SCNC: 26.2 MMOL/L (ref 22–29)
COLOR UR: YELLOW
COLOR UR: YELLOW
CREAT BLD-MCNC: 0.76 MG/DL (ref 0.76–1.27)
CREAT BLD-MCNC: 0.81 MG/DL (ref 0.76–1.27)
CREAT BLD-MCNC: 1.32 MG/DL (ref 0.76–1.27)
CRP SERPL-MCNC: 1.17 MG/DL (ref 0–0.5)
D-LACTATE SERPL-SCNC: 1.6 MMOL/L (ref 0.5–2)
DEPRECATED RDW RBC AUTO: 39.1 FL (ref 37–54)
DEPRECATED RDW RBC AUTO: 39.6 FL (ref 37–54)
DEPRECATED RDW RBC AUTO: 40.6 FL (ref 37–54)
EOSINOPHIL # BLD AUTO: 0.02 10*3/MM3 (ref 0–0.4)
EOSINOPHIL # BLD AUTO: 0.12 10*3/MM3 (ref 0–0.4)
EOSINOPHIL NFR BLD AUTO: 0.2 % (ref 0.3–6.2)
EOSINOPHIL NFR BLD AUTO: 1.5 % (ref 0.3–6.2)
ERYTHROCYTE [DISTWIDTH] IN BLOOD BY AUTOMATED COUNT: 11.9 % (ref 12.3–15.4)
ERYTHROCYTE [DISTWIDTH] IN BLOOD BY AUTOMATED COUNT: 12 % (ref 12.3–15.4)
ERYTHROCYTE [DISTWIDTH] IN BLOOD BY AUTOMATED COUNT: 12.1 % (ref 12.3–15.4)
FOLATE SERPL-MCNC: 19 NG/ML (ref 4.78–24.2)
GFR SERPL CREATININE-BSD FRML MDRD: 51 ML/MIN/1.73
GFR SERPL CREATININE-BSD FRML MDRD: 89 ML/MIN/1.73
GFR SERPL CREATININE-BSD FRML MDRD: 96 ML/MIN/1.73
GLOBULIN UR ELPH-MCNC: 2.6 GM/DL
GLOBULIN UR ELPH-MCNC: 2.7 GM/DL
GLUCOSE BLD-MCNC: 166 MG/DL (ref 65–99)
GLUCOSE BLD-MCNC: 90 MG/DL (ref 65–99)
GLUCOSE BLD-MCNC: 97 MG/DL (ref 65–99)
GLUCOSE BLDC GLUCOMTR-MCNC: 83 MG/DL (ref 70–130)
GLUCOSE BLDC GLUCOMTR-MCNC: 90 MG/DL (ref 70–130)
GLUCOSE UR STRIP-MCNC: NEGATIVE MG/DL
GLUCOSE UR STRIP-MCNC: NEGATIVE MG/DL
HBA1C MFR BLD: 5.9 % (ref 4.8–5.6)
HCT VFR BLD AUTO: 33.8 % (ref 37.5–51)
HCT VFR BLD AUTO: 34.3 % (ref 37.5–51)
HCT VFR BLD AUTO: 34.9 % (ref 37.5–51)
HCT VFR BLD AUTO: 35.3 % (ref 37.5–51)
HCT VFR BLD AUTO: 35.4 % (ref 37.5–51)
HCT VFR BLD AUTO: 37.7 % (ref 37.5–51)
HDLC SERPL-MCNC: 38 MG/DL (ref 40–60)
HGB BLD-MCNC: 11.7 G/DL (ref 13–17.7)
HGB BLD-MCNC: 11.7 G/DL (ref 13–17.7)
HGB BLD-MCNC: 11.9 G/DL (ref 13–17.7)
HGB BLD-MCNC: 11.9 G/DL (ref 13–17.7)
HGB BLD-MCNC: 12.1 G/DL (ref 13–17.7)
HGB BLD-MCNC: 12.7 G/DL (ref 13–17.7)
HGB UR QL STRIP.AUTO: ABNORMAL
HGB UR QL STRIP.AUTO: NEGATIVE
HOLD SPECIMEN: NORMAL
HOLD SPECIMEN: NORMAL
HYALINE CASTS UR QL AUTO: ABNORMAL /LPF
IMM GRANULOCYTES # BLD AUTO: 0.04 10*3/MM3 (ref 0–0.05)
IMM GRANULOCYTES # BLD AUTO: 0.04 10*3/MM3 (ref 0–0.05)
IMM GRANULOCYTES NFR BLD AUTO: 0.4 % (ref 0–0.5)
IMM GRANULOCYTES NFR BLD AUTO: 0.5 % (ref 0–0.5)
INR PPP: 1.15 (ref 0.9–1.1)
KETONES UR QL STRIP: ABNORMAL
KETONES UR QL STRIP: NEGATIVE
LDLC SERPL CALC-MCNC: 50 MG/DL (ref 0–100)
LDLC/HDLC SERPL: 1.31 {RATIO}
LEFT ATRIUM VOLUME INDEX: 29 ML/M2
LEUKOCYTE ESTERASE UR QL STRIP.AUTO: NEGATIVE
LEUKOCYTE ESTERASE UR QL STRIP.AUTO: NEGATIVE
LIPASE SERPL-CCNC: 23 U/L (ref 13–60)
LV EF 2D ECHO EST: 73 %
LYMPHOCYTES # BLD AUTO: 1.27 10*3/MM3 (ref 0.7–3.1)
LYMPHOCYTES # BLD AUTO: 1.42 10*3/MM3 (ref 0.7–3.1)
LYMPHOCYTES NFR BLD AUTO: 13.1 % (ref 19.6–45.3)
LYMPHOCYTES NFR BLD AUTO: 18.2 % (ref 19.6–45.3)
MAXIMAL PREDICTED HEART RATE: 129 BPM
MCH RBC QN AUTO: 30.6 PG (ref 26.6–33)
MCH RBC QN AUTO: 30.7 PG (ref 26.6–33)
MCH RBC QN AUTO: 31.1 PG (ref 26.6–33)
MCHC RBC AUTO-ENTMCNC: 33.5 G/DL (ref 31.5–35.7)
MCHC RBC AUTO-ENTMCNC: 33.6 G/DL (ref 31.5–35.7)
MCHC RBC AUTO-ENTMCNC: 33.7 G/DL (ref 31.5–35.7)
MCV RBC AUTO: 91.4 FL (ref 79–97)
MCV RBC AUTO: 91.5 FL (ref 79–97)
MCV RBC AUTO: 92.2 FL (ref 79–97)
MONOCYTES # BLD AUTO: 0.68 10*3/MM3 (ref 0.1–0.9)
MONOCYTES # BLD AUTO: 0.7 10*3/MM3 (ref 0.1–0.9)
MONOCYTES NFR BLD AUTO: 7.2 % (ref 5–12)
MONOCYTES NFR BLD AUTO: 8.7 % (ref 5–12)
NEUTROPHILS # BLD AUTO: 5.47 10*3/MM3 (ref 1.7–7)
NEUTROPHILS # BLD AUTO: 7.59 10*3/MM3 (ref 1.7–7)
NEUTROPHILS NFR BLD AUTO: 70.3 % (ref 42.7–76)
NEUTROPHILS NFR BLD AUTO: 78.5 % (ref 42.7–76)
NITRITE UR QL STRIP: NEGATIVE
NITRITE UR QL STRIP: NEGATIVE
NRBC BLD AUTO-RTO: 0 /100 WBC (ref 0–0.2)
NRBC BLD AUTO-RTO: 0 /100 WBC (ref 0–0.2)
PH UR STRIP.AUTO: 5.5 [PH] (ref 5–8)
PH UR STRIP.AUTO: 6 [PH] (ref 5–8)
PLATELET # BLD AUTO: 352 10*3/MM3 (ref 140–450)
PLATELET # BLD AUTO: 358 10*3/MM3 (ref 140–450)
PLATELET # BLD AUTO: 451 10*3/MM3 (ref 140–450)
PMV BLD AUTO: 10.2 FL (ref 6–12)
PMV BLD AUTO: 10.2 FL (ref 6–12)
PMV BLD AUTO: 10.7 FL (ref 6–12)
POTASSIUM BLD-SCNC: 3.5 MMOL/L (ref 3.5–5.2)
POTASSIUM BLD-SCNC: 3.7 MMOL/L (ref 3.5–5.2)
POTASSIUM BLD-SCNC: 3.7 MMOL/L (ref 3.5–5.2)
PROT SERPL-MCNC: 6 G/DL (ref 6–8.5)
PROT SERPL-MCNC: 6.5 G/DL (ref 6–8.5)
PROT UR QL STRIP: NEGATIVE
PROT UR QL STRIP: NEGATIVE
PROTHROMBIN TIME: 14.4 SECONDS (ref 11.7–14.2)
RBC # BLD AUTO: 3.82 10*6/MM3 (ref 4.14–5.8)
RBC # BLD AUTO: 3.87 10*6/MM3 (ref 4.14–5.8)
RBC # BLD AUTO: 4.09 10*6/MM3 (ref 4.14–5.8)
RBC # UR: ABNORMAL /HPF
REF LAB TEST METHOD: ABNORMAL
RH BLD: POSITIVE
SODIUM BLD-SCNC: 139 MMOL/L (ref 136–145)
SODIUM BLD-SCNC: 142 MMOL/L (ref 136–145)
SODIUM BLD-SCNC: 142 MMOL/L (ref 136–145)
SP GR UR STRIP: 1.01 (ref 1–1.03)
SP GR UR STRIP: 1.02 (ref 1–1.03)
SQUAMOUS #/AREA URNS HPF: ABNORMAL /HPF
STRESS TARGET HR: 110 BPM
T&S EXPIRATION DATE: NORMAL
T4 FREE SERPL-MCNC: 1.64 NG/DL (ref 0.93–1.7)
TRIGL SERPL-MCNC: 62 MG/DL (ref 0–150)
TROPONIN T SERPL-MCNC: 0.01 NG/ML (ref 0–0.03)
TROPONIN T SERPL-MCNC: 0.01 NG/ML (ref 0–0.03)
TSH SERPL DL<=0.05 MIU/L-ACNC: 1.34 UIU/ML (ref 0.27–4.2)
TSH SERPL DL<=0.05 MIU/L-ACNC: 1.42 UIU/ML (ref 0.27–4.2)
UROBILINOGEN UR QL STRIP: ABNORMAL
UROBILINOGEN UR QL STRIP: NORMAL
VIT B12 BLD-MCNC: 1428 PG/ML (ref 211–946)
VLDLC SERPL-MCNC: 12.4 MG/DL (ref 5–40)
WBC NRBC COR # BLD: 7.79 10*3/MM3 (ref 3.4–10.8)
WBC NRBC COR # BLD: 8.76 10*3/MM3 (ref 3.4–10.8)
WBC NRBC COR # BLD: 9.68 10*3/MM3 (ref 3.4–10.8)
WBC UR QL AUTO: ABNORMAL /HPF
WHOLE BLOOD HOLD SPECIMEN: NORMAL
WHOLE BLOOD HOLD SPECIMEN: NORMAL

## 2020-01-01 PROCEDURE — 25010000002 PROPOFOL 10 MG/ML EMULSION: Performed by: NURSE ANESTHETIST, CERTIFIED REGISTERED

## 2020-01-01 PROCEDURE — 97150 GROUP THERAPEUTIC PROCEDURES: CPT

## 2020-01-01 PROCEDURE — 70498 CT ANGIOGRAPHY NECK: CPT

## 2020-01-01 PROCEDURE — 25010000002 LORAZEPAM PER 2 MG: Performed by: HOSPITALIST

## 2020-01-01 PROCEDURE — 83036 HEMOGLOBIN GLYCOSYLATED A1C: CPT | Performed by: INTERNAL MEDICINE

## 2020-01-01 PROCEDURE — 25010000002 MORPHINE PER 10 MG: Performed by: HOSPITALIST

## 2020-01-01 PROCEDURE — 0SR90JA REPLACEMENT OF RIGHT HIP JOINT WITH SYNTHETIC SUBSTITUTE, UNCEMENTED, OPEN APPROACH: ICD-10-PCS | Performed by: ORTHOPAEDIC SURGERY

## 2020-01-01 PROCEDURE — 73502 X-RAY EXAM HIP UNI 2-3 VIEWS: CPT | Performed by: ORTHOPAEDIC SURGERY

## 2020-01-01 PROCEDURE — 99024 POSTOP FOLLOW-UP VISIT: CPT | Performed by: ORTHOPAEDIC SURGERY

## 2020-01-01 PROCEDURE — 70496 CT ANGIOGRAPHY HEAD: CPT

## 2020-01-01 PROCEDURE — 99223 1ST HOSP IP/OBS HIGH 75: CPT | Performed by: PSYCHIATRY & NEUROLOGY

## 2020-01-01 PROCEDURE — 71045 X-RAY EXAM CHEST 1 VIEW: CPT

## 2020-01-01 PROCEDURE — 70450 CT HEAD/BRAIN W/O DYE: CPT

## 2020-01-01 PROCEDURE — 82962 GLUCOSE BLOOD TEST: CPT

## 2020-01-01 PROCEDURE — 25010000002 LORAZEPAM PER 2 MG: Performed by: PSYCHIATRY & NEUROLOGY

## 2020-01-01 PROCEDURE — 95816 EEG AWAKE AND DROWSY: CPT | Performed by: PSYCHIATRY & NEUROLOGY

## 2020-01-01 PROCEDURE — 99232 SBSQ HOSP IP/OBS MODERATE 35: CPT | Performed by: INTERNAL MEDICINE

## 2020-01-01 PROCEDURE — 99238 HOSP IP/OBS DSCHRG MGMT 30/<: CPT | Performed by: INTERNAL MEDICINE

## 2020-01-01 PROCEDURE — 25010000002 MORPHINE (PF) 10 MG/ML SOLUTION 1 ML VIAL: Performed by: ORTHOPAEDIC SURGERY

## 2020-01-01 PROCEDURE — 97110 THERAPEUTIC EXERCISES: CPT | Performed by: PHYSICAL THERAPIST

## 2020-01-01 PROCEDURE — 99285 EMERGENCY DEPT VISIT HI MDM: CPT

## 2020-01-01 PROCEDURE — 82550 ASSAY OF CK (CPK): CPT | Performed by: PSYCHIATRY & NEUROLOGY

## 2020-01-01 PROCEDURE — 25010000002 HYDROMORPHONE 1 MG/ML SOLUTION: Performed by: HOSPITALIST

## 2020-01-01 PROCEDURE — 85610 PROTHROMBIN TIME: CPT | Performed by: EMERGENCY MEDICINE

## 2020-01-01 PROCEDURE — 25010000002 HALOPERIDOL LACTATE PER 5 MG: Performed by: EMERGENCY MEDICINE

## 2020-01-01 PROCEDURE — 84484 ASSAY OF TROPONIN QUANT: CPT | Performed by: INTERNAL MEDICINE

## 2020-01-01 PROCEDURE — 81003 URINALYSIS AUTO W/O SCOPE: CPT | Performed by: ORTHOPAEDIC SURGERY

## 2020-01-01 PROCEDURE — 85014 HEMATOCRIT: CPT | Performed by: NURSE PRACTITIONER

## 2020-01-01 PROCEDURE — 97161 PT EVAL LOW COMPLEX 20 MIN: CPT | Performed by: PHYSICAL THERAPIST

## 2020-01-01 PROCEDURE — 95816 EEG AWAKE AND DROWSY: CPT

## 2020-01-01 PROCEDURE — 83605 ASSAY OF LACTIC ACID: CPT | Performed by: EMERGENCY MEDICINE

## 2020-01-01 PROCEDURE — 81001 URINALYSIS AUTO W/SCOPE: CPT | Performed by: NURSE PRACTITIONER

## 2020-01-01 PROCEDURE — 99233 SBSQ HOSP IP/OBS HIGH 50: CPT | Performed by: NURSE PRACTITIONER

## 2020-01-01 PROCEDURE — 93971 EXTREMITY STUDY: CPT

## 2020-01-01 PROCEDURE — 0 IOPAMIDOL PER 1 ML: Performed by: HOSPITALIST

## 2020-01-01 PROCEDURE — 80048 BASIC METABOLIC PNL TOTAL CA: CPT | Performed by: HOSPITALIST

## 2020-01-01 PROCEDURE — 25010000002 PHENYLEPHRINE PER 1 ML: Performed by: NURSE ANESTHETIST, CERTIFIED REGISTERED

## 2020-01-01 PROCEDURE — 84443 ASSAY THYROID STIM HORMONE: CPT | Performed by: INTERNAL MEDICINE

## 2020-01-01 PROCEDURE — 82746 ASSAY OF FOLIC ACID SERUM: CPT | Performed by: PSYCHIATRY & NEUROLOGY

## 2020-01-01 PROCEDURE — 97162 PT EVAL MOD COMPLEX 30 MIN: CPT

## 2020-01-01 PROCEDURE — 25010000002 ROPIVACAINE PER 1 MG: Performed by: ORTHOPAEDIC SURGERY

## 2020-01-01 PROCEDURE — 99221 1ST HOSP IP/OBS SF/LOW 40: CPT | Performed by: INTERNAL MEDICINE

## 2020-01-01 PROCEDURE — 25010000002 MIDAZOLAM PER 1 MG: Performed by: EMERGENCY MEDICINE

## 2020-01-01 PROCEDURE — 27130 TOTAL HIP ARTHROPLASTY: CPT | Performed by: ORTHOPAEDIC SURGERY

## 2020-01-01 PROCEDURE — 99222 1ST HOSP IP/OBS MODERATE 55: CPT | Performed by: PSYCHIATRY & NEUROLOGY

## 2020-01-01 PROCEDURE — 82140 ASSAY OF AMMONIA: CPT | Performed by: PSYCHIATRY & NEUROLOGY

## 2020-01-01 PROCEDURE — 85027 COMPLETE CBC AUTOMATED: CPT | Performed by: HOSPITALIST

## 2020-01-01 PROCEDURE — 97110 THERAPEUTIC EXERCISES: CPT

## 2020-01-01 PROCEDURE — 93306 TTE W/DOPPLER COMPLETE: CPT

## 2020-01-01 PROCEDURE — 85025 COMPLETE CBC W/AUTO DIFF WBC: CPT | Performed by: INTERNAL MEDICINE

## 2020-01-01 PROCEDURE — 86900 BLOOD TYPING SEROLOGIC ABO: CPT | Performed by: ORTHOPAEDIC SURGERY

## 2020-01-01 PROCEDURE — 25010000002 KETOROLAC TROMETHAMINE PER 15 MG: Performed by: ORTHOPAEDIC SURGERY

## 2020-01-01 PROCEDURE — 25010000002 ENOXAPARIN PER 10 MG: Performed by: INTERNAL MEDICINE

## 2020-01-01 PROCEDURE — 86901 BLOOD TYPING SEROLOGIC RH(D): CPT | Performed by: ORTHOPAEDIC SURGERY

## 2020-01-01 PROCEDURE — 92610 EVALUATE SWALLOWING FUNCTION: CPT

## 2020-01-01 PROCEDURE — 86140 C-REACTIVE PROTEIN: CPT | Performed by: PSYCHIATRY & NEUROLOGY

## 2020-01-01 PROCEDURE — 85018 HEMOGLOBIN: CPT | Performed by: NURSE PRACTITIONER

## 2020-01-01 PROCEDURE — 99222 1ST HOSP IP/OBS MODERATE 55: CPT | Performed by: INTERNAL MEDICINE

## 2020-01-01 PROCEDURE — 87040 BLOOD CULTURE FOR BACTERIA: CPT | Performed by: EMERGENCY MEDICINE

## 2020-01-01 PROCEDURE — 25010000002 PERFLUTREN (DEFINITY) 8.476 MG IN SODIUM CHLORIDE 0.9 % 10 ML INJECTION: Performed by: NURSE PRACTITIONER

## 2020-01-01 PROCEDURE — 27130 TOTAL HIP ARTHROPLASTY: CPT | Performed by: NURSE PRACTITIONER

## 2020-01-01 PROCEDURE — 83690 ASSAY OF LIPASE: CPT | Performed by: EMERGENCY MEDICINE

## 2020-01-01 PROCEDURE — 99231 SBSQ HOSP IP/OBS SF/LOW 25: CPT | Performed by: INTERNAL MEDICINE

## 2020-01-01 PROCEDURE — P9612 CATHETERIZE FOR URINE SPEC: HCPCS

## 2020-01-01 PROCEDURE — 73501 X-RAY EXAM HIP UNI 1 VIEW: CPT

## 2020-01-01 PROCEDURE — 84484 ASSAY OF TROPONIN QUANT: CPT | Performed by: EMERGENCY MEDICINE

## 2020-01-01 PROCEDURE — 82607 VITAMIN B-12: CPT | Performed by: PSYCHIATRY & NEUROLOGY

## 2020-01-01 PROCEDURE — 97535 SELF CARE MNGMENT TRAINING: CPT

## 2020-01-01 PROCEDURE — 93005 ELECTROCARDIOGRAM TRACING: CPT | Performed by: EMERGENCY MEDICINE

## 2020-01-01 PROCEDURE — 80053 COMPREHEN METABOLIC PANEL: CPT | Performed by: INTERNAL MEDICINE

## 2020-01-01 PROCEDURE — 25010000002 VANCOMYCIN 10 G RECONSTITUTED SOLUTION: Performed by: NURSE PRACTITIONER

## 2020-01-01 PROCEDURE — 85025 COMPLETE CBC W/AUTO DIFF WBC: CPT | Performed by: NURSE PRACTITIONER

## 2020-01-01 PROCEDURE — 25010000002 VANCOMYCIN 10 G RECONSTITUTED SOLUTION: Performed by: ORTHOPAEDIC SURGERY

## 2020-01-01 PROCEDURE — 70551 MRI BRAIN STEM W/O DYE: CPT

## 2020-01-01 PROCEDURE — 85730 THROMBOPLASTIN TIME PARTIAL: CPT | Performed by: EMERGENCY MEDICINE

## 2020-01-01 PROCEDURE — 93010 ELECTROCARDIOGRAM REPORT: CPT | Performed by: INTERNAL MEDICINE

## 2020-01-01 PROCEDURE — 80053 COMPREHEN METABOLIC PANEL: CPT | Performed by: NURSE PRACTITIONER

## 2020-01-01 PROCEDURE — 97165 OT EVAL LOW COMPLEX 30 MIN: CPT

## 2020-01-01 PROCEDURE — 84439 ASSAY OF FREE THYROXINE: CPT | Performed by: EMERGENCY MEDICINE

## 2020-01-01 PROCEDURE — 93306 TTE W/DOPPLER COMPLETE: CPT | Performed by: INTERNAL MEDICINE

## 2020-01-01 PROCEDURE — 84443 ASSAY THYROID STIM HORMONE: CPT | Performed by: EMERGENCY MEDICINE

## 2020-01-01 PROCEDURE — 25010000002 PROMETHAZINE PER 50 MG: Performed by: HOSPITALIST

## 2020-01-01 PROCEDURE — C1776 JOINT DEVICE (IMPLANTABLE): HCPCS | Performed by: ORTHOPAEDIC SURGERY

## 2020-01-01 PROCEDURE — 86850 RBC ANTIBODY SCREEN: CPT | Performed by: ORTHOPAEDIC SURGERY

## 2020-01-01 PROCEDURE — 80061 LIPID PANEL: CPT | Performed by: PSYCHIATRY & NEUROLOGY

## 2020-01-01 DEVICE — R3 20 DEGREE XLPE ACETABULAR LINER                                    36MM ID X OD 56MM
Type: IMPLANTABLE DEVICE | Site: HIP | Status: FUNCTIONAL
Brand: R3

## 2020-01-01 DEVICE — REFLECTION SPHERICAL HEAD SCREW 35MM
Type: IMPLANTABLE DEVICE | Site: HIP | Status: FUNCTIONAL
Brand: REFLECTION

## 2020-01-01 DEVICE — REFLECTION SPHERICAL HEAD SCREW 25MM
Type: IMPLANTABLE DEVICE | Site: HIP | Status: FUNCTIONAL
Brand: REFLECTION

## 2020-01-01 DEVICE — IMPLANTABLE DEVICE: Type: IMPLANTABLE DEVICE | Site: HIP | Status: FUNCTIONAL

## 2020-01-01 DEVICE — POLARSTEM STANDARD NON-CEMENTED                                    WITH TI/HA 4
Type: IMPLANTABLE DEVICE | Site: HIP | Status: FUNCTIONAL
Brand: POLARSTEM

## 2020-01-01 DEVICE — OXINIUM FEMORAL HEAD 12/14 TAPER                                    36 MM M/+4
Type: IMPLANTABLE DEVICE | Site: HIP | Status: FUNCTIONAL
Brand: OXINIUM

## 2020-01-01 DEVICE — R3 3 HOLE ACETABULAR SHELL 56MM
Type: IMPLANTABLE DEVICE | Site: HIP | Status: FUNCTIONAL
Brand: R3 ACETABULAR

## 2020-01-01 RX ORDER — ACETAMINOPHEN 160 MG/5ML
650 SOLUTION ORAL EVERY 4 HOURS PRN
Status: DISCONTINUED | OUTPATIENT
Start: 2020-01-01 | End: 2020-01-01

## 2020-01-01 RX ORDER — HYDROCODONE BITARTRATE AND ACETAMINOPHEN 7.5; 325 MG/1; MG/1
1 TABLET ORAL ONCE AS NEEDED
Status: DISCONTINUED | OUTPATIENT
Start: 2020-01-01 | End: 2020-01-01 | Stop reason: HOSPADM

## 2020-01-01 RX ORDER — ACETAMINOPHEN 325 MG/1
650 TABLET ORAL EVERY 4 HOURS PRN
Status: DISCONTINUED | OUTPATIENT
Start: 2020-01-01 | End: 2020-01-01 | Stop reason: HOSPADM

## 2020-01-01 RX ORDER — DOCUSATE SODIUM 100 MG/1
100 CAPSULE, LIQUID FILLED ORAL 2 TIMES DAILY
Status: DISCONTINUED | OUTPATIENT
Start: 2020-01-01 | End: 2020-01-01 | Stop reason: HOSPADM

## 2020-01-01 RX ORDER — WOUND DRESSING ADHESIVE - LIQUID
LIQUID MISCELLANEOUS AS NEEDED
Status: DISCONTINUED | OUTPATIENT
Start: 2020-01-01 | End: 2020-01-01 | Stop reason: HOSPADM

## 2020-01-01 RX ORDER — ACETAMINOPHEN 160 MG/5ML
650 SOLUTION ORAL EVERY 4 HOURS PRN
Status: DISCONTINUED | OUTPATIENT
Start: 2020-01-01 | End: 2020-01-01 | Stop reason: HOSPADM

## 2020-01-01 RX ORDER — MORPHINE SULFATE 10 MG/ML
6 INJECTION INTRAMUSCULAR; INTRAVENOUS; SUBCUTANEOUS
Status: DISCONTINUED | OUTPATIENT
Start: 2020-01-01 | End: 2020-01-01 | Stop reason: HOSPADM

## 2020-01-01 RX ORDER — LORAZEPAM 2 MG/ML
0.5 CONCENTRATE ORAL
Status: DISCONTINUED | OUTPATIENT
Start: 2020-01-01 | End: 2020-01-01 | Stop reason: HOSPADM

## 2020-01-01 RX ORDER — MORPHINE SULFATE 4 MG/ML
4 INJECTION, SOLUTION INTRAMUSCULAR; INTRAVENOUS
Status: CANCELLED | OUTPATIENT
Start: 2020-01-01 | End: 2020-02-10

## 2020-01-01 RX ORDER — ACETAMINOPHEN 160 MG/5ML
650 SOLUTION ORAL EVERY 4 HOURS PRN
Status: CANCELLED | OUTPATIENT
Start: 2020-01-01

## 2020-01-01 RX ORDER — MORPHINE SULFATE 4 MG/ML
4 INJECTION, SOLUTION INTRAMUSCULAR; INTRAVENOUS
Status: DISCONTINUED | OUTPATIENT
Start: 2020-01-01 | End: 2020-01-01 | Stop reason: HOSPADM

## 2020-01-01 RX ORDER — HYDROCODONE BITARTRATE AND ACETAMINOPHEN 7.5; 325 MG/1; MG/1
2 TABLET ORAL EVERY 4 HOURS PRN
Status: DISCONTINUED | OUTPATIENT
Start: 2020-01-01 | End: 2020-01-01 | Stop reason: HOSPADM

## 2020-01-01 RX ORDER — NITROGLYCERIN 0.4 MG/1
0.4 TABLET SUBLINGUAL
Status: DISCONTINUED | OUTPATIENT
Start: 2020-01-01 | End: 2020-01-01

## 2020-01-01 RX ORDER — PREGABALIN 75 MG/1
150 CAPSULE ORAL ONCE
Status: COMPLETED | OUTPATIENT
Start: 2020-01-01 | End: 2020-01-01

## 2020-01-01 RX ORDER — PROMETHAZINE HYDROCHLORIDE 25 MG/1
25 TABLET ORAL ONCE AS NEEDED
Status: DISCONTINUED | OUTPATIENT
Start: 2020-01-01 | End: 2020-01-01 | Stop reason: HOSPADM

## 2020-01-01 RX ORDER — PROMETHAZINE HYDROCHLORIDE 6.25 MG/5ML
12.5 SYRUP ORAL EVERY 4 HOURS PRN
Status: DISCONTINUED | OUTPATIENT
Start: 2020-01-01 | End: 2020-01-01 | Stop reason: HOSPADM

## 2020-01-01 RX ORDER — ACETAMINOPHEN 10 MG/ML
1000 INJECTION, SOLUTION INTRAVENOUS ONCE
Status: COMPLETED | OUTPATIENT
Start: 2020-01-01 | End: 2020-01-01

## 2020-01-01 RX ORDER — LORAZEPAM 2 MG/ML
1 CONCENTRATE ORAL
Status: DISCONTINUED | OUTPATIENT
Start: 2020-01-01 | End: 2020-01-01 | Stop reason: HOSPADM

## 2020-01-01 RX ORDER — HYDROCHLOROTHIAZIDE 12.5 MG/1
12.5 CAPSULE, GELATIN COATED ORAL DAILY
Status: DISCONTINUED | OUTPATIENT
Start: 2020-01-01 | End: 2020-01-01 | Stop reason: HOSPADM

## 2020-01-01 RX ORDER — LORAZEPAM 2 MG/ML
1 INJECTION INTRAMUSCULAR
Status: DISCONTINUED | OUTPATIENT
Start: 2020-01-01 | End: 2020-01-01 | Stop reason: HOSPADM

## 2020-01-01 RX ORDER — PROPOFOL 10 MG/ML
VIAL (ML) INTRAVENOUS CONTINUOUS PRN
Status: DISCONTINUED | OUTPATIENT
Start: 2020-01-01 | End: 2020-01-01 | Stop reason: SURG

## 2020-01-01 RX ORDER — ACETAMINOPHEN 325 MG/1
650 TABLET ORAL EVERY 4 HOURS PRN
Status: CANCELLED | OUTPATIENT
Start: 2020-01-01

## 2020-01-01 RX ORDER — POTASSIUM CHLORIDE 750 MG/1
10 CAPSULE, EXTENDED RELEASE ORAL 2 TIMES DAILY WITH MEALS
Status: DISCONTINUED | OUTPATIENT
Start: 2020-01-01 | End: 2020-01-01

## 2020-01-01 RX ORDER — ACETAMINOPHEN 500 MG
500 TABLET ORAL EVERY 4 HOURS PRN
Status: DISCONTINUED | OUTPATIENT
Start: 2020-01-01 | End: 2020-01-01

## 2020-01-01 RX ORDER — LORAZEPAM 2 MG/ML
2 INJECTION INTRAMUSCULAR
Status: DISCONTINUED | OUTPATIENT
Start: 2020-01-01 | End: 2020-01-01 | Stop reason: HOSPADM

## 2020-01-01 RX ORDER — ASPIRIN 81 MG/1
81 TABLET ORAL DAILY
Status: DISCONTINUED | OUTPATIENT
Start: 2020-01-01 | End: 2020-01-01

## 2020-01-01 RX ORDER — ACETAMINOPHEN 650 MG/1
650 SUPPOSITORY RECTAL EVERY 4 HOURS PRN
Status: DISCONTINUED | OUTPATIENT
Start: 2020-01-01 | End: 2020-01-01

## 2020-01-01 RX ORDER — MORPHINE SULFATE 20 MG/ML
10 SOLUTION ORAL
Status: DISCONTINUED | OUTPATIENT
Start: 2020-01-01 | End: 2020-01-01 | Stop reason: HOSPADM

## 2020-01-01 RX ORDER — MORPHINE SULFATE 2 MG/ML
2 INJECTION, SOLUTION INTRAMUSCULAR; INTRAVENOUS
Status: CANCELLED | OUTPATIENT
Start: 2020-01-01 | End: 2020-02-10

## 2020-01-01 RX ORDER — PROMETHAZINE HYDROCHLORIDE 25 MG/1
12.5 TABLET ORAL EVERY 4 HOURS PRN
Status: DISCONTINUED | OUTPATIENT
Start: 2020-01-01 | End: 2020-01-01

## 2020-01-01 RX ORDER — LORAZEPAM 2 MG/ML
2 CONCENTRATE ORAL
Status: DISCONTINUED | OUTPATIENT
Start: 2020-01-01 | End: 2020-01-01 | Stop reason: HOSPADM

## 2020-01-01 RX ORDER — EPHEDRINE SULFATE 50 MG/ML
5 INJECTION, SOLUTION INTRAVENOUS ONCE AS NEEDED
Status: DISCONTINUED | OUTPATIENT
Start: 2020-01-01 | End: 2020-01-01 | Stop reason: HOSPADM

## 2020-01-01 RX ORDER — PROMETHAZINE HYDROCHLORIDE 25 MG/ML
12.5 INJECTION, SOLUTION INTRAMUSCULAR; INTRAVENOUS EVERY 4 HOURS PRN
Status: DISCONTINUED | OUTPATIENT
Start: 2020-01-01 | End: 2020-01-01 | Stop reason: HOSPADM

## 2020-01-01 RX ORDER — HYDROMORPHONE HYDROCHLORIDE 1 MG/ML
0.5 INJECTION, SOLUTION INTRAMUSCULAR; INTRAVENOUS; SUBCUTANEOUS
Status: DISCONTINUED | OUTPATIENT
Start: 2020-01-01 | End: 2020-01-01 | Stop reason: HOSPADM

## 2020-01-01 RX ORDER — MIDAZOLAM HYDROCHLORIDE 1 MG/ML
1 INJECTION INTRAMUSCULAR; INTRAVENOUS
Status: DISCONTINUED | OUTPATIENT
Start: 2020-01-01 | End: 2020-01-01 | Stop reason: HOSPADM

## 2020-01-01 RX ORDER — MAGNESIUM HYDROXIDE 1200 MG/15ML
LIQUID ORAL AS NEEDED
Status: DISCONTINUED | OUTPATIENT
Start: 2020-01-01 | End: 2020-01-01 | Stop reason: HOSPADM

## 2020-01-01 RX ORDER — TAMSULOSIN HYDROCHLORIDE 0.4 MG/1
0.4 CAPSULE ORAL DAILY
Status: DISCONTINUED | OUTPATIENT
Start: 2020-01-01 | End: 2020-01-01 | Stop reason: HOSPADM

## 2020-01-01 RX ORDER — SODIUM CHLORIDE, SODIUM LACTATE, POTASSIUM CHLORIDE, CALCIUM CHLORIDE 600; 310; 30; 20 MG/100ML; MG/100ML; MG/100ML; MG/100ML
9 INJECTION, SOLUTION INTRAVENOUS CONTINUOUS
Status: DISCONTINUED | OUTPATIENT
Start: 2020-01-01 | End: 2020-01-01 | Stop reason: HOSPADM

## 2020-01-01 RX ORDER — ACETAMINOPHEN 325 MG/1
650 TABLET ORAL EVERY 4 HOURS PRN
Status: DISCONTINUED | OUTPATIENT
Start: 2020-01-01 | End: 2020-01-01

## 2020-01-01 RX ORDER — LORAZEPAM 2 MG/ML
1 INJECTION INTRAMUSCULAR EVERY 4 HOURS PRN
Status: DISCONTINUED | OUTPATIENT
Start: 2020-01-01 | End: 2020-01-01

## 2020-01-01 RX ORDER — ACETAMINOPHEN 650 MG/1
650 SUPPOSITORY RECTAL EVERY 4 HOURS PRN
Status: DISCONTINUED | OUTPATIENT
Start: 2020-01-01 | End: 2020-01-01 | Stop reason: HOSPADM

## 2020-01-01 RX ORDER — ASPIRIN 300 MG/1
300 SUPPOSITORY RECTAL DAILY
Status: DISCONTINUED | OUTPATIENT
Start: 2020-01-01 | End: 2020-01-01

## 2020-01-01 RX ORDER — TRANEXAMIC ACID 100 MG/ML
INJECTION, SOLUTION INTRAVENOUS AS NEEDED
Status: DISCONTINUED | OUTPATIENT
Start: 2020-01-01 | End: 2020-01-01 | Stop reason: SURG

## 2020-01-01 RX ORDER — LORAZEPAM 2 MG/ML
2 CONCENTRATE ORAL
Status: CANCELLED | OUTPATIENT
Start: 2020-01-01 | End: 2020-02-10

## 2020-01-01 RX ORDER — PROMETHAZINE HYDROCHLORIDE 25 MG/1
12.5 TABLET ORAL EVERY 4 HOURS PRN
Status: CANCELLED | OUTPATIENT
Start: 2020-01-01

## 2020-01-01 RX ORDER — PROMETHAZINE HYDROCHLORIDE 25 MG/ML
12.5 INJECTION, SOLUTION INTRAMUSCULAR; INTRAVENOUS ONCE AS NEEDED
Status: DISCONTINUED | OUTPATIENT
Start: 2020-01-01 | End: 2020-01-01 | Stop reason: HOSPADM

## 2020-01-01 RX ORDER — ONDANSETRON 2 MG/ML
4 INJECTION INTRAMUSCULAR; INTRAVENOUS ONCE AS NEEDED
Status: DISCONTINUED | OUTPATIENT
Start: 2020-01-01 | End: 2020-01-01 | Stop reason: HOSPADM

## 2020-01-01 RX ORDER — HALOPERIDOL 2 MG/1
4 TABLET ORAL
Status: DISCONTINUED | OUTPATIENT
Start: 2020-01-01 | End: 2020-01-01

## 2020-01-01 RX ORDER — PROMETHAZINE HYDROCHLORIDE 25 MG/1
25 SUPPOSITORY RECTAL ONCE AS NEEDED
Status: DISCONTINUED | OUTPATIENT
Start: 2020-01-01 | End: 2020-01-01 | Stop reason: HOSPADM

## 2020-01-01 RX ORDER — ATORVASTATIN CALCIUM 20 MG/1
40 TABLET, FILM COATED ORAL DAILY
Status: DISCONTINUED | OUTPATIENT
Start: 2020-01-01 | End: 2020-01-01

## 2020-01-01 RX ORDER — SCOLOPAMINE TRANSDERMAL SYSTEM 1 MG/1
1 PATCH, EXTENDED RELEASE TRANSDERMAL
Status: DISCONTINUED | OUTPATIENT
Start: 2020-01-01 | End: 2020-01-01 | Stop reason: HOSPADM

## 2020-01-01 RX ORDER — HALOPERIDOL 5 MG/ML
5 INJECTION INTRAMUSCULAR ONCE
Status: COMPLETED | OUTPATIENT
Start: 2020-01-01 | End: 2020-01-01

## 2020-01-01 RX ORDER — PROPOFOL 10 MG/ML
VIAL (ML) INTRAVENOUS AS NEEDED
Status: DISCONTINUED | OUTPATIENT
Start: 2020-01-01 | End: 2020-01-01 | Stop reason: SURG

## 2020-01-01 RX ORDER — DIPHENOXYLATE HYDROCHLORIDE AND ATROPINE SULFATE 2.5; .025 MG/1; MG/1
1 TABLET ORAL
Status: DISCONTINUED | OUTPATIENT
Start: 2020-01-01 | End: 2020-01-01 | Stop reason: HOSPADM

## 2020-01-01 RX ORDER — PROMETHAZINE HYDROCHLORIDE 25 MG/1
12.5 SUPPOSITORY RECTAL EVERY 4 HOURS PRN
Status: DISCONTINUED | OUTPATIENT
Start: 2020-01-01 | End: 2020-01-01

## 2020-01-01 RX ORDER — PROMETHAZINE HYDROCHLORIDE 25 MG/1
12.5 SUPPOSITORY RECTAL EVERY 4 HOURS PRN
Status: DISCONTINUED | OUTPATIENT
Start: 2020-01-01 | End: 2020-01-01 | Stop reason: HOSPADM

## 2020-01-01 RX ORDER — LORAZEPAM 2 MG/ML
0.5 INJECTION INTRAMUSCULAR
Status: DISCONTINUED | OUTPATIENT
Start: 2020-01-01 | End: 2020-01-01 | Stop reason: HOSPADM

## 2020-01-01 RX ORDER — ASPIRIN 325 MG
325 TABLET, DELAYED RELEASE (ENTERIC COATED) ORAL EVERY 12 HOURS SCHEDULED
Status: DISCONTINUED | OUTPATIENT
Start: 2020-01-01 | End: 2020-01-01 | Stop reason: HOSPADM

## 2020-01-01 RX ORDER — ASPIRIN 325 MG
325 TABLET, DELAYED RELEASE (ENTERIC COATED) ORAL 2 TIMES DAILY
Status: DISCONTINUED | OUTPATIENT
Start: 2020-01-01 | End: 2020-01-01

## 2020-01-01 RX ORDER — LORAZEPAM 2 MG/ML
1 INJECTION INTRAMUSCULAR
Status: CANCELLED | OUTPATIENT
Start: 2020-01-01 | End: 2020-02-10

## 2020-01-01 RX ORDER — PROMETHAZINE HYDROCHLORIDE 25 MG/ML
12.5 INJECTION, SOLUTION INTRAMUSCULAR; INTRAVENOUS EVERY 4 HOURS PRN
Status: DISCONTINUED | OUTPATIENT
Start: 2020-01-01 | End: 2020-01-01

## 2020-01-01 RX ORDER — DIPHENOXYLATE HYDROCHLORIDE AND ATROPINE SULFATE 2.5; .025 MG/1; MG/1
1 TABLET ORAL
Status: CANCELLED | OUTPATIENT
Start: 2020-01-01 | End: 2020-02-10

## 2020-01-01 RX ORDER — PROMETHAZINE HYDROCHLORIDE 25 MG/1
12.5 SUPPOSITORY RECTAL EVERY 4 HOURS PRN
Status: CANCELLED | OUTPATIENT
Start: 2020-01-01

## 2020-01-01 RX ORDER — MELOXICAM 15 MG/1
15 TABLET ORAL ONCE
Status: COMPLETED | OUTPATIENT
Start: 2020-01-01 | End: 2020-01-01

## 2020-01-01 RX ORDER — ASPIRIN 325 MG
325 TABLET ORAL DAILY
Status: DISCONTINUED | OUTPATIENT
Start: 2020-01-01 | End: 2020-01-01

## 2020-01-01 RX ORDER — QUETIAPINE FUMARATE 25 MG/1
25 TABLET, FILM COATED ORAL ONCE
Status: DISCONTINUED | OUTPATIENT
Start: 2020-01-01 | End: 2020-01-01

## 2020-01-01 RX ORDER — HALOPERIDOL 5 MG/ML
2 INJECTION INTRAMUSCULAR EVERY 4 HOURS PRN
Status: DISCONTINUED | OUTPATIENT
Start: 2020-01-01 | End: 2020-01-01

## 2020-01-01 RX ORDER — SODIUM CHLORIDE 0.9 % (FLUSH) 0.9 %
10 SYRINGE (ML) INJECTION AS NEEDED
Status: DISCONTINUED | OUTPATIENT
Start: 2020-01-01 | End: 2020-01-01

## 2020-01-01 RX ORDER — ONDANSETRON 2 MG/ML
4 INJECTION INTRAMUSCULAR; INTRAVENOUS EVERY 6 HOURS PRN
Status: DISCONTINUED | OUTPATIENT
Start: 2020-01-01 | End: 2020-01-01 | Stop reason: HOSPADM

## 2020-01-01 RX ORDER — LIDOCAINE HYDROCHLORIDE 10 MG/ML
0.5 INJECTION, SOLUTION EPIDURAL; INFILTRATION; INTRACAUDAL; PERINEURAL ONCE AS NEEDED
Status: DISCONTINUED | OUTPATIENT
Start: 2020-01-01 | End: 2020-01-01 | Stop reason: HOSPADM

## 2020-01-01 RX ORDER — ACETAMINOPHEN 325 MG/1
325 TABLET ORAL EVERY 4 HOURS PRN
Start: 2020-01-01 | End: 2020-01-01

## 2020-01-01 RX ORDER — PROMETHAZINE HYDROCHLORIDE 25 MG/ML
12.5 INJECTION, SOLUTION INTRAMUSCULAR; INTRAVENOUS EVERY 4 HOURS PRN
Status: CANCELLED | OUTPATIENT
Start: 2020-01-01

## 2020-01-01 RX ORDER — AMIODARONE HYDROCHLORIDE 200 MG/1
400 TABLET ORAL 2 TIMES DAILY WITH MEALS
Status: DISCONTINUED | OUTPATIENT
Start: 2020-01-01 | End: 2020-01-01

## 2020-01-01 RX ORDER — L.ACID,PARA/B.BIFIDUM/S.THERM 8B CELL
1 CAPSULE ORAL DAILY
Status: DISCONTINUED | OUTPATIENT
Start: 2020-01-01 | End: 2020-01-01

## 2020-01-01 RX ORDER — MIDAZOLAM HYDROCHLORIDE 1 MG/ML
2 INJECTION INTRAMUSCULAR; INTRAVENOUS
Status: DISCONTINUED | OUTPATIENT
Start: 2020-01-01 | End: 2020-01-01 | Stop reason: HOSPADM

## 2020-01-01 RX ORDER — FENTANYL CITRATE 50 UG/ML
50 INJECTION, SOLUTION INTRAMUSCULAR; INTRAVENOUS
Status: DISCONTINUED | OUTPATIENT
Start: 2020-01-01 | End: 2020-01-01 | Stop reason: HOSPADM

## 2020-01-01 RX ORDER — SODIUM CHLORIDE 0.9 % (FLUSH) 0.9 %
3 SYRINGE (ML) INJECTION EVERY 12 HOURS SCHEDULED
Status: DISCONTINUED | OUTPATIENT
Start: 2020-01-01 | End: 2020-01-01 | Stop reason: HOSPADM

## 2020-01-01 RX ORDER — NALOXONE HCL 0.4 MG/ML
0.2 VIAL (ML) INJECTION AS NEEDED
Status: DISCONTINUED | OUTPATIENT
Start: 2020-01-01 | End: 2020-01-01 | Stop reason: HOSPADM

## 2020-01-01 RX ORDER — FINASTERIDE 5 MG/1
5 TABLET, FILM COATED ORAL DAILY
Status: DISCONTINUED | OUTPATIENT
Start: 2020-01-01 | End: 2020-01-01 | Stop reason: HOSPADM

## 2020-01-01 RX ORDER — MORPHINE SULFATE 2 MG/ML
2 INJECTION, SOLUTION INTRAMUSCULAR; INTRAVENOUS
Status: DISCONTINUED | OUTPATIENT
Start: 2020-01-01 | End: 2020-01-01 | Stop reason: HOSPADM

## 2020-01-01 RX ORDER — PANTOPRAZOLE SODIUM 40 MG/1
40 TABLET, DELAYED RELEASE ORAL EVERY MORNING
Qty: 14 TABLET | Refills: 0 | Status: SHIPPED | OUTPATIENT
Start: 2020-01-01 | End: 2020-01-01

## 2020-01-01 RX ORDER — SACCHAROMYCES BOULARDII 250 MG
250 CAPSULE ORAL 2 TIMES DAILY
COMMUNITY
End: 2020-01-01

## 2020-01-01 RX ORDER — MORPHINE SULFATE 20 MG/ML
10 SOLUTION ORAL
Status: CANCELLED | OUTPATIENT
Start: 2020-01-01 | End: 2020-02-10

## 2020-01-01 RX ORDER — HALOPERIDOL 5 MG/ML
0.5 INJECTION INTRAMUSCULAR EVERY 6 HOURS PRN
Status: DISCONTINUED | OUTPATIENT
Start: 2020-01-01 | End: 2020-01-01

## 2020-01-01 RX ORDER — SODIUM CHLORIDE 9 MG/ML
100 INJECTION, SOLUTION INTRAVENOUS CONTINUOUS
Status: DISCONTINUED | OUTPATIENT
Start: 2020-01-01 | End: 2020-01-01

## 2020-01-01 RX ORDER — CETIRIZINE HYDROCHLORIDE 10 MG/1
5 TABLET ORAL DAILY
Status: DISCONTINUED | OUTPATIENT
Start: 2020-01-01 | End: 2020-01-01

## 2020-01-01 RX ORDER — LORAZEPAM 2 MG/ML
0.5 INJECTION INTRAMUSCULAR
Status: CANCELLED | OUTPATIENT
Start: 2020-01-01 | End: 2020-02-10

## 2020-01-01 RX ORDER — FINASTERIDE 5 MG/1
TABLET, FILM COATED ORAL
Qty: 14 TABLET | Refills: 0 | OUTPATIENT
Start: 2020-01-01

## 2020-01-01 RX ORDER — SCOLOPAMINE TRANSDERMAL SYSTEM 1 MG/1
1 PATCH, EXTENDED RELEASE TRANSDERMAL
Status: CANCELLED | OUTPATIENT
Start: 2020-01-01

## 2020-01-01 RX ORDER — ACETAMINOPHEN 500 MG
500 TABLET ORAL EVERY 4 HOURS PRN
COMMUNITY

## 2020-01-01 RX ORDER — MORPHINE SULFATE 20 MG/ML
5 SOLUTION ORAL
Status: CANCELLED | OUTPATIENT
Start: 2020-01-01 | End: 2020-02-10

## 2020-01-01 RX ORDER — DIPHENHYDRAMINE HCL 25 MG
25 CAPSULE ORAL
Status: DISCONTINUED | OUTPATIENT
Start: 2020-01-01 | End: 2020-01-01 | Stop reason: HOSPADM

## 2020-01-01 RX ORDER — HYDROCODONE BITARTRATE AND ACETAMINOPHEN 7.5; 325 MG/1; MG/1
2 TABLET ORAL EVERY 4 HOURS PRN
Qty: 60 TABLET | Refills: 0 | Status: SHIPPED | OUTPATIENT
Start: 2020-01-01 | End: 2020-01-01

## 2020-01-01 RX ORDER — HYDRALAZINE HYDROCHLORIDE 20 MG/ML
5 INJECTION INTRAMUSCULAR; INTRAVENOUS
Status: DISCONTINUED | OUTPATIENT
Start: 2020-01-01 | End: 2020-01-01 | Stop reason: HOSPADM

## 2020-01-01 RX ORDER — CHOLECALCIFEROL (VITAMIN D3) 125 MCG
5 CAPSULE ORAL NIGHTLY
Status: DISCONTINUED | OUTPATIENT
Start: 2020-01-01 | End: 2020-01-01

## 2020-01-01 RX ORDER — ATROPINE SULFATE 10 MG/ML
2 SOLUTION/ DROPS OPHTHALMIC 2 TIMES DAILY PRN
Status: CANCELLED | OUTPATIENT
Start: 2020-01-01

## 2020-01-01 RX ORDER — PROMETHAZINE HYDROCHLORIDE 6.25 MG/5ML
12.5 SYRUP ORAL EVERY 4 HOURS PRN
Status: DISCONTINUED | OUTPATIENT
Start: 2020-01-01 | End: 2020-01-01

## 2020-01-01 RX ORDER — OLANZAPINE 10 MG/1
5 INJECTION, POWDER, LYOPHILIZED, FOR SOLUTION INTRAMUSCULAR EVERY 8 HOURS PRN
Status: DISCONTINUED | OUTPATIENT
Start: 2020-01-01 | End: 2020-01-01

## 2020-01-01 RX ORDER — LORAZEPAM 2 MG/ML
1 CONCENTRATE ORAL
Status: CANCELLED | OUTPATIENT
Start: 2020-01-01 | End: 2020-02-10

## 2020-01-01 RX ORDER — PSEUDOEPHEDRINE HCL 30 MG
100 TABLET ORAL 2 TIMES DAILY
Qty: 23 EACH | Refills: 0 | Status: SHIPPED | OUTPATIENT
Start: 2020-01-01 | End: 2020-01-01

## 2020-01-01 RX ORDER — HYDROMORPHONE HYDROCHLORIDE 1 MG/ML
0.5 INJECTION, SOLUTION INTRAMUSCULAR; INTRAVENOUS; SUBCUTANEOUS
Status: CANCELLED | OUTPATIENT
Start: 2020-01-01 | End: 2020-02-10

## 2020-01-01 RX ORDER — OXYCODONE AND ACETAMINOPHEN 7.5; 325 MG/1; MG/1
1 TABLET ORAL ONCE AS NEEDED
Status: DISCONTINUED | OUTPATIENT
Start: 2020-01-01 | End: 2020-01-01 | Stop reason: HOSPADM

## 2020-01-01 RX ORDER — MORPHINE SULFATE 20 MG/ML
5 SOLUTION ORAL
Status: DISCONTINUED | OUTPATIENT
Start: 2020-01-01 | End: 2020-01-01 | Stop reason: HOSPADM

## 2020-01-01 RX ORDER — SODIUM CHLORIDE 0.9 % (FLUSH) 0.9 %
3-10 SYRINGE (ML) INJECTION AS NEEDED
Status: DISCONTINUED | OUTPATIENT
Start: 2020-01-01 | End: 2020-01-01 | Stop reason: HOSPADM

## 2020-01-01 RX ORDER — ACETAMINOPHEN 325 MG/1
650 TABLET ORAL ONCE AS NEEDED
Status: DISCONTINUED | OUTPATIENT
Start: 2020-01-01 | End: 2020-01-01 | Stop reason: HOSPADM

## 2020-01-01 RX ORDER — LISINOPRIL 40 MG/1
40 TABLET ORAL DAILY
Status: DISCONTINUED | OUTPATIENT
Start: 2020-01-01 | End: 2020-01-01 | Stop reason: HOSPADM

## 2020-01-01 RX ORDER — ATROPINE SULFATE 10 MG/ML
2 SOLUTION/ DROPS OPHTHALMIC 2 TIMES DAILY PRN
Status: DISCONTINUED | OUTPATIENT
Start: 2020-01-01 | End: 2020-01-01 | Stop reason: HOSPADM

## 2020-01-01 RX ORDER — ATORVASTATIN CALCIUM 80 MG/1
80 TABLET, FILM COATED ORAL NIGHTLY
Status: DISCONTINUED | OUTPATIENT
Start: 2020-01-01 | End: 2020-01-01

## 2020-01-01 RX ORDER — ONDANSETRON 2 MG/ML
4 INJECTION INTRAMUSCULAR; INTRAVENOUS EVERY 6 HOURS PRN
Status: DISCONTINUED | OUTPATIENT
Start: 2020-01-01 | End: 2020-01-01

## 2020-01-01 RX ORDER — LABETALOL HYDROCHLORIDE 5 MG/ML
5 INJECTION, SOLUTION INTRAVENOUS
Status: DISCONTINUED | OUTPATIENT
Start: 2020-01-01 | End: 2020-01-01 | Stop reason: HOSPADM

## 2020-01-01 RX ORDER — SODIUM CHLORIDE 0.9 % (FLUSH) 0.9 %
10 SYRINGE (ML) INJECTION EVERY 12 HOURS SCHEDULED
Status: DISCONTINUED | OUTPATIENT
Start: 2020-01-01 | End: 2020-01-01

## 2020-01-01 RX ORDER — PROMETHAZINE HYDROCHLORIDE 25 MG/ML
6.25 INJECTION, SOLUTION INTRAMUSCULAR; INTRAVENOUS
Status: DISCONTINUED | OUTPATIENT
Start: 2020-01-01 | End: 2020-01-01 | Stop reason: HOSPADM

## 2020-01-01 RX ORDER — ONDANSETRON 4 MG/1
4 TABLET, FILM COATED ORAL EVERY 6 HOURS PRN
Qty: 10 TABLET | Refills: 0 | Status: SHIPPED | OUTPATIENT
Start: 2020-01-01 | End: 2020-01-01

## 2020-01-01 RX ORDER — FINASTERIDE 5 MG/1
5 TABLET, FILM COATED ORAL DAILY
Qty: 14 TABLET | Refills: 0 | Status: SHIPPED | OUTPATIENT
Start: 2020-01-01 | End: 2020-01-01

## 2020-01-01 RX ORDER — FLUMAZENIL 0.1 MG/ML
0.2 INJECTION INTRAVENOUS AS NEEDED
Status: DISCONTINUED | OUTPATIENT
Start: 2020-01-01 | End: 2020-01-01 | Stop reason: HOSPADM

## 2020-01-01 RX ORDER — ONDANSETRON 4 MG/1
4 TABLET, FILM COATED ORAL EVERY 6 HOURS PRN
Status: DISCONTINUED | OUTPATIENT
Start: 2020-01-01 | End: 2020-01-01 | Stop reason: HOSPADM

## 2020-01-01 RX ORDER — ACETAMINOPHEN 650 MG/1
650 SUPPOSITORY RECTAL EVERY 4 HOURS PRN
Status: CANCELLED | OUTPATIENT
Start: 2020-01-01

## 2020-01-01 RX ORDER — LORAZEPAM 2 MG/ML
2 INJECTION INTRAMUSCULAR
Status: CANCELLED | OUTPATIENT
Start: 2020-01-01 | End: 2020-02-10

## 2020-01-01 RX ORDER — OLANZAPINE 10 MG/1
10 INJECTION, POWDER, LYOPHILIZED, FOR SOLUTION INTRAMUSCULAR EVERY 8 HOURS PRN
Status: DISCONTINUED | OUTPATIENT
Start: 2020-01-01 | End: 2020-01-01

## 2020-01-01 RX ORDER — LORAZEPAM 2 MG/ML
0.5 CONCENTRATE ORAL
Status: CANCELLED | OUTPATIENT
Start: 2020-01-01 | End: 2020-02-10

## 2020-01-01 RX ORDER — FAMOTIDINE 10 MG/ML
20 INJECTION, SOLUTION INTRAVENOUS ONCE
Status: COMPLETED | OUTPATIENT
Start: 2020-01-01 | End: 2020-01-01

## 2020-01-01 RX ORDER — DIPHENHYDRAMINE HYDROCHLORIDE 50 MG/ML
12.5 INJECTION INTRAMUSCULAR; INTRAVENOUS
Status: DISCONTINUED | OUTPATIENT
Start: 2020-01-01 | End: 2020-01-01 | Stop reason: HOSPADM

## 2020-01-01 RX ORDER — PROMETHAZINE HYDROCHLORIDE 25 MG/1
12.5 TABLET ORAL EVERY 4 HOURS PRN
Status: DISCONTINUED | OUTPATIENT
Start: 2020-01-01 | End: 2020-01-01 | Stop reason: HOSPADM

## 2020-01-01 RX ORDER — PANTOPRAZOLE SODIUM 40 MG/1
40 TABLET, DELAYED RELEASE ORAL EVERY MORNING
Status: DISCONTINUED | OUTPATIENT
Start: 2020-01-01 | End: 2020-01-01 | Stop reason: HOSPADM

## 2020-01-01 RX ORDER — BUPIVACAINE HYDROCHLORIDE 7.5 MG/ML
INJECTION, SOLUTION EPIDURAL; RETROBULBAR
Status: COMPLETED | OUTPATIENT
Start: 2020-01-01 | End: 2020-01-01

## 2020-01-01 RX ORDER — PROMETHAZINE HYDROCHLORIDE 6.25 MG/5ML
12.5 SYRUP ORAL EVERY 4 HOURS PRN
Status: CANCELLED | OUTPATIENT
Start: 2020-01-01

## 2020-01-01 RX ORDER — SODIUM CHLORIDE, SODIUM LACTATE, POTASSIUM CHLORIDE, CALCIUM CHLORIDE 600; 310; 30; 20 MG/100ML; MG/100ML; MG/100ML; MG/100ML
INJECTION, SOLUTION INTRAVENOUS CONTINUOUS PRN
Status: DISCONTINUED | OUTPATIENT
Start: 2020-01-01 | End: 2020-01-01 | Stop reason: SURG

## 2020-01-01 RX ORDER — HYDROMORPHONE HCL 110MG/55ML
1.5 PATIENT CONTROLLED ANALGESIA SYRINGE INTRAVENOUS
Status: DISCONTINUED | OUTPATIENT
Start: 2020-01-01 | End: 2020-01-01 | Stop reason: HOSPADM

## 2020-01-01 RX ORDER — OLANZAPINE 10 MG/1
5 INJECTION, POWDER, LYOPHILIZED, FOR SOLUTION INTRAMUSCULAR ONCE
Status: COMPLETED | OUTPATIENT
Start: 2020-01-01 | End: 2020-01-01

## 2020-01-01 RX ORDER — LIDOCAINE HYDROCHLORIDE 20 MG/ML
INJECTION, SOLUTION INFILTRATION; PERINEURAL AS NEEDED
Status: DISCONTINUED | OUTPATIENT
Start: 2020-01-01 | End: 2020-01-01 | Stop reason: SURG

## 2020-01-01 RX ORDER — HYDROCODONE BITARTRATE AND ACETAMINOPHEN 7.5; 325 MG/1; MG/1
1 TABLET ORAL EVERY 4 HOURS PRN
Status: DISCONTINUED | OUTPATIENT
Start: 2020-01-01 | End: 2020-01-01 | Stop reason: HOSPADM

## 2020-01-01 RX ORDER — GLYCOPYRROLATE 0.2 MG/ML
INJECTION INTRAMUSCULAR; INTRAVENOUS AS NEEDED
Status: DISCONTINUED | OUTPATIENT
Start: 2020-01-01 | End: 2020-01-01 | Stop reason: SURG

## 2020-01-01 RX ORDER — MIDAZOLAM HYDROCHLORIDE 1 MG/ML
2 INJECTION INTRAMUSCULAR; INTRAVENOUS ONCE
Status: COMPLETED | OUTPATIENT
Start: 2020-01-01 | End: 2020-01-01

## 2020-01-01 RX ORDER — MORPHINE SULFATE 20 MG/ML
20 SOLUTION ORAL
Status: DISCONTINUED | OUTPATIENT
Start: 2020-01-01 | End: 2020-01-01 | Stop reason: HOSPADM

## 2020-01-01 RX ORDER — ACETAMINOPHEN 325 MG/1
325 TABLET ORAL EVERY 4 HOURS PRN
Status: DISCONTINUED | OUTPATIENT
Start: 2020-01-01 | End: 2020-01-01 | Stop reason: HOSPADM

## 2020-01-01 RX ORDER — PROPOFOL 10 MG/ML
VIAL (ML) INTRAVENOUS CONTINUOUS PRN
Status: DISCONTINUED | OUTPATIENT
Start: 2020-01-01 | End: 2020-01-01

## 2020-01-01 RX ORDER — ASPIRIN 325 MG
325 TABLET, DELAYED RELEASE (ENTERIC COATED) ORAL 2 TIMES DAILY
COMMUNITY

## 2020-01-01 RX ADMIN — MUPIROCIN 1 APPLICATION: 20 OINTMENT TOPICAL at 09:02

## 2020-01-01 RX ADMIN — ASPIRIN 325 MG: 325 TABLET, COATED ORAL at 09:45

## 2020-01-01 RX ADMIN — LORAZEPAM 2 MG: 2 INJECTION INTRAMUSCULAR; INTRAVENOUS at 00:52

## 2020-01-01 RX ADMIN — HYDROCHLOROTHIAZIDE 12.5 MG: 12.5 CAPSULE ORAL at 08:59

## 2020-01-01 RX ADMIN — TRANEXAMIC ACID 1000 MG: 100 INJECTION, SOLUTION INTRAVENOUS at 15:12

## 2020-01-01 RX ADMIN — METOPROLOL TARTRATE 25 MG: 25 TABLET ORAL at 08:42

## 2020-01-01 RX ADMIN — PHENYLEPHRINE HYDROCHLORIDE 200 MCG: 10 INJECTION INTRAVENOUS at 14:16

## 2020-01-01 RX ADMIN — METOPROLOL TARTRATE 25 MG: 25 TABLET ORAL at 20:45

## 2020-01-01 RX ADMIN — IOPAMIDOL 100 ML: 755 INJECTION, SOLUTION INTRAVENOUS at 19:15

## 2020-01-01 RX ADMIN — MORPHINE SULFATE 2 MG: 2 INJECTION, SOLUTION INTRAMUSCULAR; INTRAVENOUS at 17:23

## 2020-01-01 RX ADMIN — SODIUM CHLORIDE, PRESERVATIVE FREE 10 ML: 5 INJECTION INTRAVENOUS at 22:06

## 2020-01-01 RX ADMIN — GLYCOPYRROLATE 0.4 MG: 0.2 INJECTION, SOLUTION INTRAMUSCULAR; INTRAVITREAL at 00:46

## 2020-01-01 RX ADMIN — SODIUM CHLORIDE, PRESERVATIVE FREE 10 ML: 5 INJECTION INTRAVENOUS at 21:11

## 2020-01-01 RX ADMIN — ASPIRIN 325 MG: 325 TABLET, COATED ORAL at 08:42

## 2020-01-01 RX ADMIN — ACETAMINOPHEN 325 MG: 325 TABLET, FILM COATED ORAL at 08:57

## 2020-01-01 RX ADMIN — CETIRIZINE HYDROCHLORIDE 5 MG: 10 TABLET, FILM COATED ORAL at 08:42

## 2020-01-01 RX ADMIN — LORAZEPAM 1 MG: 2 INJECTION INTRAMUSCULAR; INTRAVENOUS at 09:07

## 2020-01-01 RX ADMIN — LORAZEPAM 0.5 MG: 2 INJECTION INTRAMUSCULAR; INTRAVENOUS at 15:56

## 2020-01-01 RX ADMIN — LORAZEPAM 2 MG: 2 INJECTION INTRAMUSCULAR; INTRAVENOUS at 12:56

## 2020-01-01 RX ADMIN — PROPOFOL 30 MG: 10 INJECTION, EMULSION INTRAVENOUS at 14:00

## 2020-01-01 RX ADMIN — ACETAMINOPHEN 325 MG: 325 TABLET, FILM COATED ORAL at 22:00

## 2020-01-01 RX ADMIN — MORPHINE SULFATE 4 MG: 4 INJECTION INTRAVENOUS at 18:02

## 2020-01-01 RX ADMIN — LISINOPRIL 40 MG: 40 TABLET ORAL at 08:56

## 2020-01-01 RX ADMIN — OLANZAPINE 5 MG: 10 INJECTION, POWDER, FOR SOLUTION INTRAMUSCULAR at 11:12

## 2020-01-01 RX ADMIN — ENOXAPARIN SODIUM 30 MG: 30 INJECTION SUBCUTANEOUS at 22:51

## 2020-01-01 RX ADMIN — MORPHINE SULFATE 2 MG: 2 INJECTION, SOLUTION INTRAMUSCULAR; INTRAVENOUS at 12:56

## 2020-01-01 RX ADMIN — GLYCOPYRROLATE 0.4 MG: 0.2 INJECTION, SOLUTION INTRAMUSCULAR; INTRAVITREAL at 09:07

## 2020-01-01 RX ADMIN — LORAZEPAM 2 MG: 2 INJECTION INTRAMUSCULAR; INTRAVENOUS at 05:01

## 2020-01-01 RX ADMIN — MORPHINE SULFATE 4 MG: 4 INJECTION INTRAVENOUS at 04:47

## 2020-01-01 RX ADMIN — LORAZEPAM 2 MG: 2 INJECTION INTRAMUSCULAR; INTRAVENOUS at 00:46

## 2020-01-01 RX ADMIN — GLYCOPYRROLATE 0.4 MG: 0.2 INJECTION, SOLUTION INTRAMUSCULAR; INTRAVITREAL at 17:23

## 2020-01-01 RX ADMIN — MORPHINE SULFATE 2 MG: 2 INJECTION, SOLUTION INTRAMUSCULAR; INTRAVENOUS at 20:43

## 2020-01-01 RX ADMIN — FINASTERIDE 5 MG: 5 TABLET, FILM COATED ORAL at 09:52

## 2020-01-01 RX ADMIN — SODIUM CHLORIDE, PRESERVATIVE FREE 10 ML: 5 INJECTION INTRAVENOUS at 22:07

## 2020-01-01 RX ADMIN — ASPIRIN 325 MG: 325 TABLET ORAL at 23:26

## 2020-01-01 RX ADMIN — MIDAZOLAM 2 MG: 1 INJECTION INTRAMUSCULAR; INTRAVENOUS at 13:13

## 2020-01-01 RX ADMIN — PREGABALIN 150 MG: 75 CAPSULE ORAL at 13:02

## 2020-01-01 RX ADMIN — GLYCOPYRROLATE 0.2 MG: 0.2 INJECTION INTRAMUSCULAR; INTRAVENOUS at 15:01

## 2020-01-01 RX ADMIN — LORAZEPAM 1 MG: 2 INJECTION INTRAMUSCULAR; INTRAVENOUS at 04:47

## 2020-01-01 RX ADMIN — MORPHINE SULFATE 2 MG: 2 INJECTION, SOLUTION INTRAMUSCULAR; INTRAVENOUS at 21:03

## 2020-01-01 RX ADMIN — SODIUM CHLORIDE, PRESERVATIVE FREE 10 ML: 5 INJECTION INTRAVENOUS at 20:58

## 2020-01-01 RX ADMIN — LISINOPRIL 40 MG: 40 TABLET ORAL at 08:59

## 2020-01-01 RX ADMIN — ACETAMINOPHEN 325 MG: 325 TABLET, FILM COATED ORAL at 02:09

## 2020-01-01 RX ADMIN — LORAZEPAM 2 MG: 2 INJECTION INTRAMUSCULAR; INTRAVENOUS at 07:50

## 2020-01-01 RX ADMIN — LORAZEPAM 2 MG: 2 INJECTION INTRAMUSCULAR; INTRAVENOUS at 20:54

## 2020-01-01 RX ADMIN — MELOXICAM 15 MG: 15 TABLET ORAL at 13:02

## 2020-01-01 RX ADMIN — POLYETHYLENE GLYCOL 3350 17 G: 17 POWDER, FOR SOLUTION ORAL at 08:59

## 2020-01-01 RX ADMIN — MUPIROCIN 1 APPLICATION: 20 OINTMENT TOPICAL at 22:15

## 2020-01-01 RX ADMIN — PROMETHAZINE HYDROCHLORIDE 12.5 MG: 25 INJECTION INTRAMUSCULAR; INTRAVENOUS at 14:53

## 2020-01-01 RX ADMIN — MORPHINE SULFATE 2 MG: 2 INJECTION, SOLUTION INTRAMUSCULAR; INTRAVENOUS at 16:58

## 2020-01-01 RX ADMIN — METOPROLOL TARTRATE 25 MG: 25 TABLET ORAL at 21:11

## 2020-01-01 RX ADMIN — LORAZEPAM 2 MG: 2 INJECTION INTRAMUSCULAR; INTRAVENOUS at 04:47

## 2020-01-01 RX ADMIN — POLYETHYLENE GLYCOL 3350 17 G: 17 POWDER, FOR SOLUTION ORAL at 22:15

## 2020-01-01 RX ADMIN — MORPHINE SULFATE 2 MG: 2 INJECTION, SOLUTION INTRAMUSCULAR; INTRAVENOUS at 04:47

## 2020-01-01 RX ADMIN — LORAZEPAM 0.5 MG: 2 INJECTION INTRAMUSCULAR; INTRAVENOUS at 14:44

## 2020-01-01 RX ADMIN — LORAZEPAM 2 MG: 2 INJECTION INTRAMUSCULAR; INTRAVENOUS at 09:59

## 2020-01-01 RX ADMIN — HYDROCHLOROTHIAZIDE 12.5 MG: 12.5 CAPSULE ORAL at 09:46

## 2020-01-01 RX ADMIN — ATORVASTATIN CALCIUM 80 MG: 80 TABLET, FILM COATED ORAL at 23:27

## 2020-01-01 RX ADMIN — METOPROLOL TARTRATE 25 MG: 25 TABLET ORAL at 08:59

## 2020-01-01 RX ADMIN — BUPIVACAINE HYDROCHLORIDE 1.6 ML: 7.5 INJECTION, SOLUTION EPIDURAL; RETROBULBAR at 14:07

## 2020-01-01 RX ADMIN — ACETAMINOPHEN 325 MG: 325 TABLET, FILM COATED ORAL at 05:14

## 2020-01-01 RX ADMIN — ATORVASTATIN CALCIUM 40 MG: 20 TABLET, FILM COATED ORAL at 08:42

## 2020-01-01 RX ADMIN — GLYCOPYRROLATE 0.4 MG: 0.2 INJECTION, SOLUTION INTRAMUSCULAR; INTRAVITREAL at 08:59

## 2020-01-01 RX ADMIN — MORPHINE SULFATE 4 MG: 4 INJECTION INTRAVENOUS at 04:48

## 2020-01-01 RX ADMIN — GLYCOPYRROLATE 0.4 MG: 0.2 INJECTION, SOLUTION INTRAMUSCULAR; INTRAVITREAL at 21:01

## 2020-01-01 RX ADMIN — Medication 1 CAPSULE: at 14:22

## 2020-01-01 RX ADMIN — GLYCOPYRROLATE 0.4 MG: 0.2 INJECTION, SOLUTION INTRAMUSCULAR; INTRAVITREAL at 04:47

## 2020-01-01 RX ADMIN — SCOPALAMINE 1 PATCH: 1 PATCH, EXTENDED RELEASE TRANSDERMAL at 10:02

## 2020-01-01 RX ADMIN — GLYCOPYRROLATE 0.4 MG: 0.2 INJECTION, SOLUTION INTRAMUSCULAR; INTRAVITREAL at 04:48

## 2020-01-01 RX ADMIN — POTASSIUM CHLORIDE 10 MEQ: 750 CAPSULE, EXTENDED RELEASE ORAL at 17:26

## 2020-01-01 RX ADMIN — LORAZEPAM 2 MG: 2 INJECTION INTRAMUSCULAR; INTRAVENOUS at 20:49

## 2020-01-01 RX ADMIN — MORPHINE SULFATE 2 MG: 2 INJECTION, SOLUTION INTRAMUSCULAR; INTRAVENOUS at 05:09

## 2020-01-01 RX ADMIN — ASPIRIN 325 MG: 325 TABLET, COATED ORAL at 08:56

## 2020-01-01 RX ADMIN — POTASSIUM CHLORIDE 10 MEQ: 750 CAPSULE, EXTENDED RELEASE ORAL at 08:29

## 2020-01-01 RX ADMIN — MORPHINE SULFATE 4 MG: 4 INJECTION INTRAVENOUS at 15:42

## 2020-01-01 RX ADMIN — ASPIRIN 325 MG: 325 TABLET, COATED ORAL at 22:51

## 2020-01-01 RX ADMIN — GLYCOPYRROLATE 0.4 MG: 0.2 INJECTION, SOLUTION INTRAMUSCULAR; INTRAVITREAL at 00:52

## 2020-01-01 RX ADMIN — POTASSIUM CHLORIDE 10 MEQ: 750 CAPSULE, EXTENDED RELEASE ORAL at 17:31

## 2020-01-01 RX ADMIN — GLYCOPYRROLATE 0.4 MG: 0.2 INJECTION, SOLUTION INTRAMUSCULAR; INTRAVITREAL at 00:29

## 2020-01-01 RX ADMIN — SODIUM CHLORIDE, PRESERVATIVE FREE 10 ML: 5 INJECTION INTRAVENOUS at 22:53

## 2020-01-01 RX ADMIN — PANTOPRAZOLE SODIUM 40 MG: 40 TABLET, DELAYED RELEASE ORAL at 05:15

## 2020-01-01 RX ADMIN — HYDROCHLOROTHIAZIDE 12.5 MG: 12.5 CAPSULE ORAL at 20:44

## 2020-01-01 RX ADMIN — LORAZEPAM 2 MG: 2 INJECTION INTRAMUSCULAR; INTRAVENOUS at 09:54

## 2020-01-01 RX ADMIN — PERFLUTREN 1.5 ML: 6.52 INJECTION, SUSPENSION INTRAVENOUS at 11:10

## 2020-01-01 RX ADMIN — METOPROLOL TARTRATE 25 MG: 25 TABLET ORAL at 22:51

## 2020-01-01 RX ADMIN — MORPHINE SULFATE 2 MG: 2 INJECTION, SOLUTION INTRAMUSCULAR; INTRAVENOUS at 00:38

## 2020-01-01 RX ADMIN — ACETAMINOPHEN 325 MG: 325 TABLET, FILM COATED ORAL at 09:45

## 2020-01-01 RX ADMIN — MUPIROCIN 1 APPLICATION: 20 OINTMENT TOPICAL at 20:46

## 2020-01-01 RX ADMIN — METOPROLOL TARTRATE 25 MG: 25 TABLET ORAL at 08:56

## 2020-01-01 RX ADMIN — LORAZEPAM 2 MG: 2 INJECTION INTRAMUSCULAR; INTRAVENOUS at 00:50

## 2020-01-01 RX ADMIN — MORPHINE SULFATE 2 MG: 2 INJECTION, SOLUTION INTRAMUSCULAR; INTRAVENOUS at 20:49

## 2020-01-01 RX ADMIN — SODIUM CHLORIDE, PRESERVATIVE FREE 10 ML: 5 INJECTION INTRAVENOUS at 20:14

## 2020-01-01 RX ADMIN — LORAZEPAM 2 MG: 2 INJECTION INTRAMUSCULAR; INTRAVENOUS at 17:30

## 2020-01-01 RX ADMIN — PHENYLEPHRINE HYDROCHLORIDE 100 MCG: 10 INJECTION INTRAVENOUS at 14:13

## 2020-01-01 RX ADMIN — MUPIROCIN 1 APPLICATION: 20 OINTMENT TOPICAL at 08:59

## 2020-01-01 RX ADMIN — DOCUSATE SODIUM 100 MG: 100 CAPSULE, LIQUID FILLED ORAL at 09:45

## 2020-01-01 RX ADMIN — LORAZEPAM 2 MG: 2 INJECTION INTRAMUSCULAR; INTRAVENOUS at 12:58

## 2020-01-01 RX ADMIN — LORAZEPAM 2 MG: 2 INJECTION INTRAMUSCULAR; INTRAVENOUS at 08:59

## 2020-01-01 RX ADMIN — GLYCOPYRROLATE 0.4 MG: 0.2 INJECTION, SOLUTION INTRAMUSCULAR; INTRAVITREAL at 13:16

## 2020-01-01 RX ADMIN — GLYCOPYRROLATE 0.4 MG: 0.2 INJECTION, SOLUTION INTRAMUSCULAR; INTRAVITREAL at 12:58

## 2020-01-01 RX ADMIN — ACETAMINOPHEN 1000 MG: 10 INJECTION, SOLUTION INTRAVENOUS at 14:00

## 2020-01-01 RX ADMIN — SODIUM CHLORIDE 1250 MG: 9 INJECTION, SOLUTION INTRAVENOUS at 01:30

## 2020-01-01 RX ADMIN — LORAZEPAM 2 MG: 2 INJECTION INTRAMUSCULAR; INTRAVENOUS at 18:02

## 2020-01-01 RX ADMIN — POLYETHYLENE GLYCOL 3350 17 G: 17 POWDER, FOR SOLUTION ORAL at 08:56

## 2020-01-01 RX ADMIN — GLYCOPYRROLATE 0.4 MG: 0.2 INJECTION, SOLUTION INTRAMUSCULAR; INTRAVITREAL at 18:03

## 2020-01-01 RX ADMIN — GLYCOPYRROLATE 0.4 MG: 0.2 INJECTION, SOLUTION INTRAMUSCULAR; INTRAVITREAL at 16:58

## 2020-01-01 RX ADMIN — LORAZEPAM 1 MG: 2 INJECTION INTRAMUSCULAR; INTRAVENOUS at 21:03

## 2020-01-01 RX ADMIN — MORPHINE SULFATE 2 MG: 2 INJECTION, SOLUTION INTRAMUSCULAR; INTRAVENOUS at 17:30

## 2020-01-01 RX ADMIN — ATORVASTATIN CALCIUM 80 MG: 80 TABLET, FILM COATED ORAL at 20:14

## 2020-01-01 RX ADMIN — MORPHINE SULFATE 2 MG: 2 INJECTION, SOLUTION INTRAMUSCULAR; INTRAVENOUS at 05:01

## 2020-01-01 RX ADMIN — HALOPERIDOL 4 MG: 2 TABLET ORAL at 17:26

## 2020-01-01 RX ADMIN — DOCUSATE SODIUM 100 MG: 100 CAPSULE, LIQUID FILLED ORAL at 08:56

## 2020-01-01 RX ADMIN — LORAZEPAM 2 MG: 2 INJECTION INTRAMUSCULAR; INTRAVENOUS at 21:00

## 2020-01-01 RX ADMIN — MORPHINE SULFATE 4 MG: 4 INJECTION INTRAVENOUS at 07:50

## 2020-01-01 RX ADMIN — GLYCOPYRROLATE 0.4 MG: 0.2 INJECTION, SOLUTION INTRAMUSCULAR; INTRAVITREAL at 09:58

## 2020-01-01 RX ADMIN — POLYETHYLENE GLYCOL 3350 17 G: 17 POWDER, FOR SOLUTION ORAL at 09:45

## 2020-01-01 RX ADMIN — POTASSIUM CHLORIDE 10 MEQ: 750 CAPSULE, EXTENDED RELEASE ORAL at 22:51

## 2020-01-01 RX ADMIN — MORPHINE SULFATE 2 MG: 2 INJECTION, SOLUTION INTRAMUSCULAR; INTRAVENOUS at 21:02

## 2020-01-01 RX ADMIN — PHENYLEPHRINE HYDROCHLORIDE 100 MCG: 10 INJECTION INTRAVENOUS at 14:56

## 2020-01-01 RX ADMIN — SODIUM CHLORIDE 100 ML/HR: 9 INJECTION, SOLUTION INTRAVENOUS at 22:52

## 2020-01-01 RX ADMIN — GLYCOPYRROLATE 0.4 MG: 0.2 INJECTION, SOLUTION INTRAMUSCULAR; INTRAVITREAL at 04:46

## 2020-01-01 RX ADMIN — MORPHINE SULFATE 4 MG: 4 INJECTION INTRAVENOUS at 09:59

## 2020-01-01 RX ADMIN — GLYCOPYRROLATE 0.4 MG: 0.2 INJECTION, SOLUTION INTRAMUSCULAR; INTRAVITREAL at 17:30

## 2020-01-01 RX ADMIN — LORAZEPAM 2 MG: 2 INJECTION INTRAMUSCULAR; INTRAVENOUS at 04:48

## 2020-01-01 RX ADMIN — LORAZEPAM 2 MG: 2 INJECTION INTRAMUSCULAR; INTRAVENOUS at 05:09

## 2020-01-01 RX ADMIN — MORPHINE SULFATE 2 MG: 2 INJECTION, SOLUTION INTRAMUSCULAR; INTRAVENOUS at 12:58

## 2020-01-01 RX ADMIN — MORPHINE SULFATE 2 MG: 2 INJECTION, SOLUTION INTRAMUSCULAR; INTRAVENOUS at 09:54

## 2020-01-01 RX ADMIN — SODIUM CHLORIDE, PRESERVATIVE FREE 10 ML: 5 INJECTION INTRAVENOUS at 08:44

## 2020-01-01 RX ADMIN — SODIUM CHLORIDE, PRESERVATIVE FREE 10 ML: 5 INJECTION INTRAVENOUS at 20:57

## 2020-01-01 RX ADMIN — SODIUM CHLORIDE, POTASSIUM CHLORIDE, SODIUM LACTATE AND CALCIUM CHLORIDE: 600; 310; 30; 20 INJECTION, SOLUTION INTRAVENOUS at 13:34

## 2020-01-01 RX ADMIN — HYDROMORPHONE HYDROCHLORIDE 1 MG: 1 INJECTION, SOLUTION INTRAMUSCULAR; INTRAVENOUS; SUBCUTANEOUS at 08:19

## 2020-01-01 RX ADMIN — GLYCOPYRROLATE 0.4 MG: 0.2 INJECTION, SOLUTION INTRAMUSCULAR; INTRAVITREAL at 20:54

## 2020-01-01 RX ADMIN — POLYETHYLENE GLYCOL 3350 17 G: 17 POWDER, FOR SOLUTION ORAL at 20:43

## 2020-01-01 RX ADMIN — DOCUSATE SODIUM 100 MG: 100 CAPSULE, LIQUID FILLED ORAL at 22:15

## 2020-01-01 RX ADMIN — MORPHINE SULFATE 2 MG: 2 INJECTION, SOLUTION INTRAMUSCULAR; INTRAVENOUS at 00:50

## 2020-01-01 RX ADMIN — GLYCOPYRROLATE 0.4 MG: 0.2 INJECTION, SOLUTION INTRAMUSCULAR; INTRAVITREAL at 20:49

## 2020-01-01 RX ADMIN — ASPIRIN 325 MG: 325 TABLET, COATED ORAL at 08:58

## 2020-01-01 RX ADMIN — LORAZEPAM 1 MG: 2 INJECTION INTRAMUSCULAR; INTRAVENOUS at 20:14

## 2020-01-01 RX ADMIN — ACETAMINOPHEN 650 MG: 325 TABLET, FILM COATED ORAL at 19:44

## 2020-01-01 RX ADMIN — METOPROLOL TARTRATE 25 MG: 25 TABLET ORAL at 20:14

## 2020-01-01 RX ADMIN — MORPHINE SULFATE 2 MG: 2 INJECTION, SOLUTION INTRAMUSCULAR; INTRAVENOUS at 09:07

## 2020-01-01 RX ADMIN — METOPROLOL TARTRATE 25 MG: 25 TABLET ORAL at 22:16

## 2020-01-01 RX ADMIN — ASPIRIN 325 MG: 325 TABLET, COATED ORAL at 22:15

## 2020-01-01 RX ADMIN — ACETAMINOPHEN 325 MG: 325 TABLET, FILM COATED ORAL at 03:57

## 2020-01-01 RX ADMIN — AMIODARONE HYDROCHLORIDE 400 MG: 200 TABLET ORAL at 13:42

## 2020-01-01 RX ADMIN — SODIUM CHLORIDE, POTASSIUM CHLORIDE, SODIUM LACTATE AND CALCIUM CHLORIDE: 600; 310; 30; 20 INJECTION, SOLUTION INTRAVENOUS at 15:03

## 2020-01-01 RX ADMIN — GLYCOPYRROLATE 0.4 MG: 0.2 INJECTION, SOLUTION INTRAMUSCULAR; INTRAVITREAL at 05:01

## 2020-01-01 RX ADMIN — VANCOMYCIN HYDROCHLORIDE 1250 MG: 10 INJECTION, POWDER, LYOPHILIZED, FOR SOLUTION INTRAVENOUS at 13:00

## 2020-01-01 RX ADMIN — GLYCOPYRROLATE 0.8 MG: 0.2 INJECTION, SOLUTION INTRAMUSCULAR; INTRAVITREAL at 07:50

## 2020-01-01 RX ADMIN — LORAZEPAM 1 MG: 2 INJECTION INTRAMUSCULAR; INTRAVENOUS at 00:38

## 2020-01-01 RX ADMIN — ONDANSETRON HYDROCHLORIDE 4 MG: 4 TABLET, FILM COATED ORAL at 22:24

## 2020-01-01 RX ADMIN — MORPHINE SULFATE 2 MG: 2 INJECTION, SOLUTION INTRAMUSCULAR; INTRAVENOUS at 15:55

## 2020-01-01 RX ADMIN — LORAZEPAM 2 MG: 2 INJECTION INTRAMUSCULAR; INTRAVENOUS at 13:16

## 2020-01-01 RX ADMIN — POTASSIUM CHLORIDE 10 MEQ: 750 CAPSULE, EXTENDED RELEASE ORAL at 08:42

## 2020-01-01 RX ADMIN — OLANZAPINE 5 MG: 10 INJECTION, POWDER, LYOPHILIZED, FOR SOLUTION INTRAMUSCULAR at 09:29

## 2020-01-01 RX ADMIN — GLYCOPYRROLATE 0.4 MG: 0.2 INJECTION, SOLUTION INTRAMUSCULAR; INTRAVITREAL at 00:50

## 2020-01-01 RX ADMIN — LORAZEPAM 2 MG: 2 INJECTION INTRAMUSCULAR; INTRAVENOUS at 12:38

## 2020-01-01 RX ADMIN — ACETAMINOPHEN 325 MG: 325 TABLET, FILM COATED ORAL at 21:54

## 2020-01-01 RX ADMIN — LORAZEPAM 2 MG: 2 INJECTION INTRAMUSCULAR; INTRAVENOUS at 20:43

## 2020-01-01 RX ADMIN — METOPROLOL TARTRATE 25 MG: 25 TABLET ORAL at 22:06

## 2020-01-01 RX ADMIN — MORPHINE SULFATE 4 MG: 4 INJECTION INTRAVENOUS at 20:54

## 2020-01-01 RX ADMIN — LORAZEPAM 2 MG: 2 INJECTION INTRAMUSCULAR; INTRAVENOUS at 00:29

## 2020-01-01 RX ADMIN — GLYCOPYRROLATE 0.4 MG: 0.2 INJECTION, SOLUTION INTRAMUSCULAR; INTRAVITREAL at 12:57

## 2020-01-01 RX ADMIN — MORPHINE SULFATE 4 MG: 4 INJECTION INTRAVENOUS at 00:30

## 2020-01-01 RX ADMIN — POTASSIUM CHLORIDE 10 MEQ: 750 CAPSULE, EXTENDED RELEASE ORAL at 14:22

## 2020-01-01 RX ADMIN — ACETAMINOPHEN 325 MG: 325 TABLET, FILM COATED ORAL at 17:51

## 2020-01-01 RX ADMIN — GLYCOPYRROLATE 0.4 MG: 0.2 INJECTION, SOLUTION INTRAMUSCULAR; INTRAVITREAL at 20:43

## 2020-01-01 RX ADMIN — LISINOPRIL 40 MG: 40 TABLET ORAL at 20:43

## 2020-01-01 RX ADMIN — FAMOTIDINE 20 MG: 10 INJECTION INTRAVENOUS at 13:02

## 2020-01-01 RX ADMIN — TRANEXAMIC ACID 1000 MG: 100 INJECTION, SOLUTION INTRAVENOUS at 13:58

## 2020-01-01 RX ADMIN — DOCUSATE SODIUM 100 MG: 100 CAPSULE, LIQUID FILLED ORAL at 21:37

## 2020-01-01 RX ADMIN — PANTOPRAZOLE SODIUM 40 MG: 40 TABLET, DELAYED RELEASE ORAL at 06:18

## 2020-01-01 RX ADMIN — SODIUM CHLORIDE, POTASSIUM CHLORIDE, SODIUM LACTATE AND CALCIUM CHLORIDE 9 ML/HR: 600; 310; 30; 20 INJECTION, SOLUTION INTRAVENOUS at 13:00

## 2020-01-01 RX ADMIN — LORAZEPAM 1 MG: 2 INJECTION INTRAMUSCULAR; INTRAVENOUS at 00:45

## 2020-01-01 RX ADMIN — MORPHINE SULFATE 2 MG: 2 INJECTION, SOLUTION INTRAMUSCULAR; INTRAVENOUS at 00:46

## 2020-01-01 RX ADMIN — HYDROCHLOROTHIAZIDE 12.5 MG: 12.5 CAPSULE ORAL at 08:56

## 2020-01-01 RX ADMIN — LIDOCAINE HYDROCHLORIDE 80 MG: 20 INJECTION, SOLUTION INFILTRATION; PERINEURAL at 14:00

## 2020-01-01 RX ADMIN — SODIUM CHLORIDE, PRESERVATIVE FREE 10 ML: 5 INJECTION INTRAVENOUS at 08:30

## 2020-01-01 RX ADMIN — DOCUSATE SODIUM 100 MG: 100 CAPSULE, LIQUID FILLED ORAL at 20:43

## 2020-01-01 RX ADMIN — HALOPERIDOL LACTATE 5 MG: 5 INJECTION, SOLUTION INTRAMUSCULAR at 13:12

## 2020-01-01 RX ADMIN — GLYCOPYRROLATE 0.4 MG: 0.2 INJECTION, SOLUTION INTRAMUSCULAR; INTRAVITREAL at 05:09

## 2020-01-01 RX ADMIN — LORAZEPAM 2 MG: 2 INJECTION INTRAMUSCULAR; INTRAVENOUS at 17:24

## 2020-01-01 RX ADMIN — ACETAMINOPHEN 325 MG: 325 TABLET, FILM COATED ORAL at 21:35

## 2020-01-01 RX ADMIN — MORPHINE SULFATE 2 MG: 2 INJECTION, SOLUTION INTRAMUSCULAR; INTRAVENOUS at 00:53

## 2020-01-01 RX ADMIN — ACETAMINOPHEN 325 MG: 325 TABLET, FILM COATED ORAL at 13:33

## 2020-01-01 RX ADMIN — Medication 5 MG: at 20:57

## 2020-01-01 RX ADMIN — PROPOFOL 15 MCG/KG/MIN: 10 INJECTION, EMULSION INTRAVENOUS at 14:39

## 2020-01-01 RX ADMIN — METOPROLOL TARTRATE 25 MG: 25 TABLET ORAL at 21:35

## 2020-01-01 RX ADMIN — GLYCOPYRROLATE 0.8 MG: 0.2 INJECTION, SOLUTION INTRAMUSCULAR; INTRAVITREAL at 12:38

## 2020-01-01 RX ADMIN — LORAZEPAM 2 MG: 2 INJECTION INTRAMUSCULAR; INTRAVENOUS at 15:42

## 2020-01-01 RX ADMIN — GLYCOPYRROLATE 0.4 MG: 0.2 INJECTION, SOLUTION INTRAMUSCULAR; INTRAVITREAL at 15:42

## 2020-01-01 RX ADMIN — DOCUSATE SODIUM 100 MG: 100 CAPSULE, LIQUID FILLED ORAL at 08:57

## 2020-01-01 RX ADMIN — PHENYLEPHRINE HYDROCHLORIDE 100 MCG: 10 INJECTION INTRAVENOUS at 14:25

## 2020-01-01 RX ADMIN — MORPHINE SULFATE 2 MG: 2 INJECTION, SOLUTION INTRAMUSCULAR; INTRAVENOUS at 08:59

## 2020-01-01 RX ADMIN — METOPROLOL TARTRATE 25 MG: 25 TABLET ORAL at 09:46

## 2020-01-01 RX ADMIN — METOPROLOL TARTRATE 25 MG: 25 TABLET ORAL at 14:22

## 2020-01-01 RX ADMIN — TAMSULOSIN HYDROCHLORIDE 0.4 MG: 0.4 CAPSULE ORAL at 09:52

## 2020-01-01 RX ADMIN — MORPHINE SULFATE 2 MG: 2 INJECTION, SOLUTION INTRAMUSCULAR; INTRAVENOUS at 13:16

## 2020-01-01 RX ADMIN — Medication 1 CAPSULE: at 08:42

## 2020-01-01 RX ADMIN — PANTOPRAZOLE SODIUM 40 MG: 40 TABLET, DELAYED RELEASE ORAL at 06:21

## 2020-01-01 RX ADMIN — SCOPALAMINE 1 PATCH: 1 PATCH, EXTENDED RELEASE TRANSDERMAL at 15:13

## 2020-01-01 RX ADMIN — HYDROMORPHONE HYDROCHLORIDE 1 MG: 1 INJECTION, SOLUTION INTRAMUSCULAR; INTRAVENOUS; SUBCUTANEOUS at 12:38

## 2020-01-01 RX ADMIN — AMIODARONE HYDROCHLORIDE 400 MG: 200 TABLET ORAL at 14:21

## 2020-01-01 RX ADMIN — EPHEDRINE SULFATE 10 MG: 50 INJECTION INTRAMUSCULAR; INTRAVENOUS; SUBCUTANEOUS at 14:32

## 2020-01-01 RX ADMIN — LORAZEPAM 2 MG: 2 INJECTION INTRAMUSCULAR; INTRAVENOUS at 16:58

## 2020-01-01 RX ADMIN — PROPOFOL 25 MCG/KG/MIN: 10 INJECTION, EMULSION INTRAVENOUS at 14:10

## 2020-01-01 RX ADMIN — ASPIRIN 325 MG: 325 TABLET, COATED ORAL at 21:35

## 2020-01-06 PROBLEM — M16.9 OA (OSTEOARTHRITIS) OF HIP: Status: ACTIVE | Noted: 2020-01-01

## 2020-01-06 NOTE — ANESTHESIA PREPROCEDURE EVALUATION
Anesthesia Evaluation     Patient summary reviewed and Nursing notes reviewed                Airway   Mallampati: II  Neck ROM: limited  Dental      Pulmonary - negative pulmonary ROS   Cardiovascular     ECG reviewed  Rhythm: regular    (+) hypertension, CAD, cardiac stents hyperlipidemia,       Neuro/Psych- negative ROS  GI/Hepatic/Renal/Endo - negative ROS     Musculoskeletal     Abdominal    Substance History - negative use     OB/GYN negative ob/gyn ROS         Other   arthritis,    history of cancer                  Anesthesia Plan    ASA 3     spinal   (SAB with sedation)    Anesthetic plan, all risks, benefits, and alternatives have been provided, discussed and informed consent has been obtained with: patient.

## 2020-01-06 NOTE — ANESTHESIA PROCEDURE NOTES
Spinal Block      Patient reassessed immediately prior to procedure    Patient location during procedure: OR  Start Time: 1/6/2020 2:02 PM  Stop Time: 1/6/2020 2:07 PM  Indication:at surgeon's request  Performed By  Anesthesiologist: Tej Pulido MD  Preanesthetic Checklist  Completed: patient identified, site marked, surgical consent, pre-op evaluation, timeout performed, IV checked, risks and benefits discussed and monitors and equipment checked  Spinal Block Prep:  Patient Position:sitting  Sterile Tech:cap, gloves, mask and sterile barriers  Prep:Betadine  Patient Monitoring:blood pressure monitoring, continuous pulse oximetry and EKG  Spinal Block Procedure  Approach:midline  Guidance:landmark technique  Location:L4-L5  Needle Type:Pencan  Needle Gauge:25 G  Placement of Spinal needle event:cerebrospinal fluid aspirated  Paresthesia: no  Fluid Appearance:clear  Medications: bupivacaine PF (MARCAINE) 0.75 % injection, 1.6 mL  Med Administered at 1/6/2020 2:07 PM   Post Assessment  Patient Tolerance:patient tolerated the procedure well with no apparent complications  Complications no

## 2020-01-06 NOTE — PLAN OF CARE
Problem: Patient Care Overview  Goal: Plan of Care Review  Outcome: Ongoing (interventions implemented as appropriate)  Flowsheets  Taken 1/6/2020 1800  Progress: no change  Outcome Summary: Pt Post op today of right total hip. VSS. A&Ox4. Numbness to BLE r/t spinal. Dressing clean dry and intact. Will continue to monitor.  Taken 1/6/2020 4803  Plan of Care Reviewed With: patient;family

## 2020-01-06 NOTE — OP NOTE
Name: Nicholas Lino  YOB: 1928    DATE OF SURGERY: 1/6/2020    PREOPERATIVE DIAGNOSIS: Right hip end-stage osteoarthritis    POSTOPERATIVE DIAGNOSIS: Right hip end-stage osteoarthritis    PROCEDURE PERFORMED: Right  total hip replacement    SURGEON: Leroy Reeves M.D.    ASSISTANT: NANCIE ELENA    IMPLANTS:  Implant Name Type Inv. Item Serial No.  Lot No. LRB No. Used   LINER ACET R3 XLPE 20D [id number] - [id number] Implant LINER ACET R3 XLPE 20D [id number]  [employer] [id number] Right 1   SHLL ACET R3 3H STD 56MM - [id number] Implant SHLL ACET R3 3H STD 56MM  [employer] [id number] Right 1   SCRW SPH HD REFLECTION 6.5X25MM - [id number] Implant SCRW SPH HD REFLECTION 6.5X25MM  [employer] [id number] Right 1   SCRW SPH HD REFLECTION 6.5X35MM - [id number] Implant SCRW SPH HD REFLECTION 6.5X35MM  [employer] [id number] Right 1   STEM POLARSTEM CMTLESS STD TI/HA SZ4 - [id number] Implant STEM POLARSTEM CMTLESS STD TI/HA SZ4  [employer] [id number] Right 1   HD FEM OXINIUM TPR 12 14 36MM PLS4 - [id number] Implant HD FEM OXINIUM TPR 12 14 36MM PLS4  [employer] [id number] Right 1       Estimated Blood Loss: 200cc  Specimens : none  Complications: none    DESCRIPTION OF PROCEDURE:    The patient was taken to the operating room and placed in the supine position. A sequential compression device was carefully placed on the non-operative leg. Preoperative antibiotics were administered. Surgical time out was performed. After adequate induction of anesthesia the patient was then transferred onto the  table and positioned appropriately in the lateral decubitus position. The hip was then prepped and draped in the usual sterile fashion.    A posterior lateral surgical incision was then made.  The gluteus jaki fascia was then divided the gluteus jaki muscle was then bluntly dissected.  A Charnley self-retaining retractor was then placed.  A posterior capsulotomy was then  performed.  The superior limb was divided in line with the piriformis tendon.  The hip was then dislocated.  There were end-stage arthritic findings.  The femoral neck osteotomy was performed according to the level dictated by the template.  The acetabulum was exposed with standard retractors.  The labrum and pulvinar were then excised.  The cup was then medialized with the starting acetabular reamer to the medial wall.  I then progressively reamed up to the appropriate size and 45° of abduction and 20° of anteversion. Line to line reaming was performed. At this point the bone was nicely prepared there was excellent bleeding bone. We then impacted the acetabular component in 45 of abduction and 20 of anteversion the cup was stable.  Per routine we augmented the fixation with 2 screws in the posterior and superior quadrant  The final liner was then placed and it locked in nicely.  At this point we injected the hip with anesthetic cocktail solution.  We then turned our attention to the femur.   Standard retractors were placed to expose the femur.  The box osteotome was used to create a starting point.  The canal finder was then used to sound the canal.  The lateralizing reamer was then used to lateralize into the greater trochanter.  We progressively reamed and broached until the broach was very solid to axial and rotational stresses.  At this point we placed the trial neck and head hip was very stable to flexion and internal rotation as well as extension and external rotation.  The leg lengths were measured to be equal.  We then removed the trial components,copiously irrigated the hip, and then impacted the final stem.  At this point we then trialed and chose the final head. The head was placed on a clean dry taper.  The leg lengths were again measured to be equal.  At this point the hip was copiously irrigated with pulse lavage and the capsule was then reapproximated with #1 PDS suture, and the remainder of the hip  was closed in multiple layers in standard fashion..  There was excellent hemostasis. We placed a one-eighth inch Hemovac drain.  Sterile dressing were applied. At the end of the case, the sponge and needle counts were reported as being correct. There were no known complications. The patient was then transported to the recovery room.      Leroy Reeves M.D.  1/6/2020

## 2020-01-06 NOTE — ANESTHESIA POSTPROCEDURE EVALUATION
Patient: Nicholas Lino    Procedure Summary     Date:  01/06/20 Room / Location:  [facility] OR [address] [place] [address] MAIN OR    Anesthesia Start:  1351 Anesthesia Stop:  1543    Procedure:  TOTAL HIP ARTHROPLASTY (Right Hip) Diagnosis:       Primary osteoarthritis of right hip      (Primary osteoarthritis of right hip [M16.11])    Surgeon:  Leroy Reeves MD Provider:  Tej Pulido MD    Anesthesia Type:  spinal ASA Status:  3          Anesthesia Type: spinal    Vitals  No vitals data found for the desired time range.          Post Anesthesia Care and Evaluation    Patient location during evaluation: PACU  Patient participation: complete - patient participated  Level of consciousness: awake and alert  Pain management: adequate  Airway patency: patent  Anesthetic complications: No anesthetic complications    Cardiovascular status: acceptable  Respiratory status: acceptable  Hydration status: acceptable    Comments: -------------------------              01/06/20                    1228        -------------------------   BP:         142/75        Pulse:        71          Resp:         20          Temp:   36.7 °C (98 °F)   SpO2:         98%        -------------------------

## 2020-01-07 NOTE — DISCHARGE PLACEMENT REQUEST
"Nicholas Lino (89 y.o. Male)     Date of Birth Social Security Number Address Home Phone MRN    09/16/1928  [address] [address] [phone] [id number]    [employer] Marital Status          Evangelical        Admission Date Admission Type Admitting Provider Attending Provider Department, Room/Bed    1/6/20 Elective Leroy Reeves MD Brown, Reid B, MD [address], P797/1    Discharge Date Discharge Disposition Discharge Destination                       Attending Provider:  Leroy Reeves MD    Allergies:  Clindamycin/lincomycin, Keflex [Cephalexin]    Isolation:  None   Infection:  None   Code Status:  CPR    Ht:  167.6 cm (66\")   Wt:  79.4 kg (175 lb 0.7 oz)    Admission Cmt:  None   Principal Problem:  Primary osteoarthritis of right hip [M16.11] More...                 Active Insurance as of 1/6/2020     Primary Coverage     Payor Plan Insurance Group Employer/Plan Group    MEDICARE MEDICARE A & B      Payor Plan Address Payor Plan Phone Number Payor Plan Fax Number Effective Dates    [address] [phone]  9/1/1993 - None Entered    [facility] [id number]       Subscriber Name Subscriber Birth Date Member ID       NICHOLAS LINO 9/16/1928 [id number]           Secondary Coverage     Payor Plan Insurance Group Employer/Plan Group    [facility] SUPP [id number]     Payor Plan Address Payor Plan Phone Number Payor Plan Fax Number Effective Dates    [address]   12/1/2016 - None Entered    [facility] [id number]       Subscriber Name Subscriber Birth Date Member ID       NICHOLAS LINO 9/16/1928 [id number]                 Emergency Contacts      (Rel.) Home Phone Work Phone Mobile Phone    Sydnee Lino (Spouse) [phone] -- [phone]    Nicholas Lino Jr (Son) [phone] -- --    Yulia Bettencourt (Daughter) [phone] -- [phone]    Judit Hyman (Daughter) -- -- [phone]              "

## 2020-01-07 NOTE — PROGRESS NOTES
Continued Stay Note  [facility]     Patient Name: Nicholas Lino  MRN: [id number]  Today's Date: 1/7/2020    Admit Date: 1/6/2020    Discharge Plan     Row Name 01/07/20 1254       Plan    Plan  Skilled bed at [facility] vs. Home with home health     Patient/Family in Agreement with Plan  yes    Plan Comments  CCP spoke with [facility] able to accept and will have bed available Thursday. CCP will follow up with patient tomorrow regarding SNF at [place] vs. Home with home health. Anahi WASHINGTON     Row Name 01/07/20 1016       Plan    Plan  Pending referral to [facility] vs. home with HH pending PT eval     Patient/Family in Agreement with Plan  yes    Plan Comments  CCP met with patient and patient's son in law at bedside. Patient consented to CCP discussing discharge planning with son in law present. CCP role explained and discharge planning discussed. Face sheet verified and IMM noted. Patient's PCP is Dr. Ruffin. Patient lives with his spouse, with one step to the entrance of his home. Patient's bedroom/bathroom are on the main level. Patient has a cane he uses for mobility. Patient denies any HH/SNF history. CCP discussed SNF; patient states his MD recommended [facility] but would like to work with PT to see if he can return home with home health. Patient agreeable to CCP making referral to [facility] incase SNF is needed. CCP gave referral to [facility]. CCP will follow for PT eval and discuss recommendations with patient and follow for pending referral to [facility]. Anahi WASHINGTON           Discharge Codes    No documentation.             PADMINI Charlton

## 2020-01-07 NOTE — PLAN OF CARE
Problem: Patient Care Overview  Goal: Plan of Care Review  Outcome: Ongoing (interventions implemented as appropriate)  Flowsheets (Taken 1/7/2020 1515)  Progress: improving  Plan of Care Reviewed With: patient; daughter  Outcome Summary: 89/M, POD 0 Right YEISON.  Pt ambulating w/walker and assist x1. Pt did not void when due.  Straight cath utilized.  Pain controlled with Tylenol.  D/C plan is rehab when able.

## 2020-01-07 NOTE — PROGRESS NOTES
Discharge Planning Assessment  [facility]     Patient Name: Nicholas Lino  MRN: [id number]  Today's Date: 1/7/2020    Admit Date: 1/6/2020    Discharge Needs Assessment     Row Name 01/07/20 1016       Living Environment    Lives With  spouse    Current Living Arrangements  home/apartment/condo    Potentially Unsafe Housing Conditions  other (see comments) no concerns     Primary Care Provided by  self    Provides Primary Care For  no one    Family Caregiver if Needed  child(shelly), adult    Quality of Family Relationships  helpful;involved;supportive    Able to Return to Prior Arrangements  yes       Resource/Environmental Concerns    Resource/Environmental Concerns  none    Transportation Concerns  car, none       Transition Planning    Patient/Family Anticipates Transition to  inpatient rehabilitation facility;home    Patient/Family Anticipated Services at Transition  home health care;skilled nursing    Transportation Anticipated  family or friend will provide       Discharge Needs Assessment    Readmission Within the Last 30 Days  no previous admission in last 30 days    Concerns to be Addressed  no discharge needs identified;denies needs/concerns at this time    Equipment Currently Used at Home  cane, straight    Anticipated Changes Related to Illness  none    Outpatient/Agency/Support Group Needs  skilled nursing facility;homecare agency    Discharge Facility/Level of Care Needs  home with home health;nursing facility, skilled        Discharge Plan     Row Name 01/07/20 1016       Plan    Plan  Pending referral to [facility] vs. home with HH pending PT eval     Patient/Family in Agreement with Plan  yes    Plan Comments  CCP met with patient and patient's son in law at bedside. Patient consented to CCP discussing discharge planning with son in law present. CCP role explained and discharge planning discussed. Face sheet verified and IMM noted. Patient's PCP is Dr. Ruffin. Patient lives with his  spouse, with one step to the entrance of his home. Patient's bedroom/bathroom are on the main level. Patient has a cane he uses for mobility. Patient denies any HH/SNF history. CCP discussed SNF; patient states his MD recommended [facility] but would like to work with PT to see if he can return home with home health. Patient agreeable to CCP making referral to [facility] incase SNF is needed. CCP gave referral to [facility]. CCP will follow for PT eval and discuss recommendations with patient and follow for pending referral to [facility]. [facility]           Destination      Service Provider Request Status Selected Services Address Phone Number Fax Number    [facility] Pending - Request Sent N/A [address] [facility] [address] [phone]      Durable Medical Equipment      Coordination has not been started for this encounter.      Dialysis/Infusion      Coordination has not been started for this encounter.      Home Medical Care      Coordination has not been started for this encounter.      Therapy      Coordination has not been started for this encounter.      Community Resources      Coordination has not been started for this encounter.          Demographic Summary     Row Name 01/07/20 1015       General Information    Admission Type  inpatient    Required Notices Provided  Important Message from Medicare    Referral Source  admission list    Reason for Consult  discharge planning    Preferred Language  English     Used During This Interaction  no        Functional Status     Row Name 01/07/20 1016       Functional Status    Usual Activity Tolerance  good    Current Activity Tolerance  moderate       Functional Status, IADL    Medications  assistive equipment    Meal Preparation  assistive equipment    Housekeeping  assistive equipment    Laundry  assistive equipment    Shopping  assistive equipment       Mental Status    General  Appearance WDL  WDL       Mental Status Summary    Recent Changes in Mental Status/Cognitive Functioning  no changes        Psychosocial    No documentation.       Abuse/Neglect    No documentation.       Legal    No documentation.       Substance Abuse    No documentation.       Patient Forms    No documentation.           PADMINI Charlton

## 2020-01-07 NOTE — THERAPY EVALUATION
Patient Name: Nicholas Lino  : 1928    MRN: [id number]                              Today's Date: 2020       Admit Date: 2020    Visit Dx:     ICD-10-CM ICD-9-CM   1. Primary osteoarthritis of right hip M16.11 715.15     Patient Active Problem List   Diagnosis   • Coronary artery disease involving native coronary artery of native heart without angina pectoris   • AA (aortic aneurysm) (CMS/HCC)   • HLD (hyperlipidemia)   • History of coronary artery stent placement   • Hypertension   • Primary osteoarthritis of right hip   • OA (osteoarthritis) of hip     Past Medical History:   Diagnosis Date   • Aortic aneurysm (CMS/HCC)    • Arthritis    • CAD (coronary artery disease)    • Cancer (CMS/HCC)     skin cancer   • Hip pain    • Hyperlipidemia    • Hypertension      Past Surgical History:   Procedure Laterality Date   • APPENDECTOMY     • ARTERIAL ANEURYSM REPAIR     • CARDIAC CATHETERIZATION     • COLONOSCOPY     • CORONARY ANGIOPLASTY WITH STENT PLACEMENT     • COSMETIC SURGERY     • FEMORAL ARTERY REPAIR     • MOLE REMOVAL     • TOTAL HIP ARTHROPLASTY Right 2020    Procedure: TOTAL HIP ARTHROPLASTY;  Surgeon: Leroy Reeves MD;  Location: [facility];  Service: Orthopedics     General Information     Row Name 20 1043          PT Evaluation Time/Intention    Document Type  evaluation  -MS     Mode of Treatment  physical therapy  -MS     Row Name 20 1043          General Information    Patient Profile Reviewed?  yes  -MS     Prior Level of Function  independent:;all household mobility  -MS     Existing Precautions/Restrictions  fall;hip, posterior  -MS     Barriers to Rehab  none identified  -MS     Row Name 20 1043          Relationship/Environment    Lives With  spouse  -MS     Row Name 20 1043          Resource/Environmental Concerns    Current Living Arrangements  home/apartment/condo  -MS     Row Name 20 1043          Home Main Entrance    Number of Stairs,  Main Entrance  one  -MS     Stair Railings, Main Entrance  railings safe and in good condition  -MS     Row Name 01/07/20 1043          Stairs Within Home, Primary    Number of Stairs, Within Home, Primary  none  -MS     Row Name 01/07/20 1043          Cognitive Assessment/Intervention- PT/OT    Orientation Status (Cognition)  oriented x 3  -MS     Row Name 01/07/20 1043          Safety Issues, Functional Mobility    Safety Issues Affecting Function (Mobility)  positioning of assistive device;sequencing abilities  -MS     Impairments Affecting Function (Mobility)  pain;range of motion (ROM);strength;endurance/activity tolerance;balance  -MS       User Key  (r) = Recorded By, (t) = Taken By, (c) = Cosigned By    Initials Name Provider Type    MS Enriqueta Faye, PT Physical Therapist        Mobility     Row Name 01/07/20 1046          Bed Mobility Assessment/Treatment    Bed Mobility Assessment/Treatment  supine-sit  -MS     Supine-Sit [place] (Bed Mobility)  contact guard;verbal cues;nonverbal cues (demo/gesture)  -MS     Assistive Device (Bed Mobility)  bed rails;head of bed elevated  -MS     Row Name 01/07/20 1046          Transfer Assessment/Treatment    Comment (Transfers)  Hand placement cues for safety awareness.  -MS     Row Name 01/07/20 1046          Sit-Stand Transfer    Sit-Stand [place] (Transfers)  contact guard;verbal cues;nonverbal cues (demo/gesture)  -MS     Assistive Device (Sit-Stand Transfers)  walker, front-wheeled  -MS     Row Name 01/07/20 1046          Gait/Stairs Assessment/Training    Gait/Stairs Assessment/Training  gait/ambulation independence  -MS     [place] Level (Gait)  contact guard;verbal cues;nonverbal cues (demo/gesture)  -MS     Assistive Device (Gait)  walker, front-wheeled  -MS     Distance in Feet (Gait)  100  -MS     Pattern (Gait)  step-to  -MS     Deviations/Abnormal Patterns (Gait)  gait speed decreased;ana decreased  -MS     Right Sided Gait  Deviations  weight shift ability decreased  -MS     Comment (Gait/Stairs)  Sequencing and walker placement cues.  -MS     Row Name 01/07/20 1046          Mobility Assessment/Intervention    Extremity Weight-bearing Status  right lower extremity  -MS     Right Lower Extremity (Weight-bearing Status)  weight-bearing as tolerated (WBAT)  -MS       User Key  (r) = Recorded By, (t) = Taken By, (c) = Cosigned By    Initials Name Provider Type    MS Enriqueta Faye, PT Physical Therapist        Obj/Interventions     Row Name 01/07/20 1047          General ROM    GENERAL ROM COMMENTS  R LE Limited 2/2 pain  -MS     Row Name 01/07/20 1047          MMT (Manual Muscle Testing)    General MMT Comments  R LE post op weakness  -MS     Row Name 01/07/20 1047          Therapeutic Exercise    Comment (Therapeutic Exercise)  L YEISON protocol x 10 reps  -MS     Row Name 01/07/20 1047          Static Sitting Balance    Level of [place] (Unsupported Sitting, Static Balance)  supervision  -MS     Sitting Position (Unsupported Sitting, Static Balance)  sitting on edge of bed  -MS     Row Name 01/07/20 1047          Static Standing Balance    Level of [place] (Supported Standing, Static Balance)  contact guard assist  -MS     Assistive Device Utilized (Supported Standing, Static Balance)  walker, rolling  -MS     Row Name 01/07/20 1047          Sensory Assessment/Intervention    Sensory General Assessment  no sensation deficits identified  -MS       User Key  (r) = Recorded By, (t) = Taken By, (c) = Cosigned By    Initials Name Provider Type    Enriqueta Dominguez, PT Physical Therapist        Goals/Plan     Row Name 01/07/20 1049          Bed Mobility Goal 1 (PT)    Activity/Assistive Device (Bed Mobility Goal 1, PT)  bed mobility activities, all  -MS     [place] Level/Cues Needed (Bed Mobility Goal 1, PT)  supervision required  -MS     Time Frame (Bed Mobility Goal 1, PT)  1 week  -MS     Row Name 01/07/20 1049           Transfer Goal 1 (PT)    Activity/Assistive Device (Transfer Goal 1, PT)  transfers, all;walker, rolling  -MS     [place] Level/Cues Needed (Transfer Goal 1, PT)  supervision required  -MS     Time Frame (Transfer Goal 1, PT)  1 week  -MS     Row Name 01/07/20 1049          Gait Training Goal 1 (PT)    Activity/Assistive Device (Gait Training Goal 1, PT)  gait (walking locomotion);walker, rolling  -MS     [place] Level (Gait Training Goal 1, PT)  supervision required  -MS     Distance (Gait Goal 1, PT)  150  -MS     Time Frame (Gait Training Goal 1, PT)  1 week  -MS     Row Name 01/07/20 1049          Stairs Goal 1 (PT)    Activity/Assistive Device (Stairs Goal 1, PT)  stairs, all skills  -MS     [place] Level/Cues Needed (Stairs Goal 1, PT)  contact guard assist  -MS     Number of Stairs (Stairs Goal 1, PT)  1  -MS     Time Frame (Stairs Goal 1, PT)  1 week  -MS       User Key  (r) = Recorded By, (t) = Taken By, (c) = Cosigned By    Initials Name Provider Type    MS YunierEnriqueta, PT Physical Therapist        Clinical Impression     Row Name 01/07/20 1048          Pain Assessment    Additional Documentation  Pain Scale: Numbers Pre/Post-Treatment (Group)  -MS     Row Name 01/07/20 1048          Pain Scale: Numbers Pre/Post-Treatment    Pain Scale: Numbers, Pretreatment  5/10  -MS     Pain Scale: Numbers, Post-Treatment  5/10  -MS     Pain Location - Side  Right  -MS     Pain Location - Orientation  incisional  -MS     Pain Location  hip  -MS     Pain Intervention(s)  Repositioned;Ambulation/increased activity;Rest  -MS     Row Name 01/07/20 1048          Plan of Care Review    Plan of Care Reviewed With  patient;spouse;family  -MS     Row Name 01/07/20 1048          Physical Therapy Clinical Impression    Patient/Family Goals Statement (PT Clinical Impression)  POD1 R YEISON- posterior  -MS     Criteria for Skilled Interventions Met (PT Clinical Impression)  yes;treatment indicated  -MS     Rehab  Potential (PT Clinical Summary)  good, to achieve stated therapy goals  -MS     Row Name 01/07/20 1048          Vital Signs    O2 Delivery Pre Treatment  room air  -MS     Row Name 01/07/20 1048          Positioning and Restraints    Pre-Treatment Position  in bed  -MS     Post Treatment Position  bathroom  -MS     Bathroom  -- shower chair with family- RN aware  -MS       User Key  (r) = Recorded By, (t) = Taken By, (c) = Cosigned By    Initials Name Provider Type    Enriqueta Dominguez, PT Physical Therapist        Outcome Measures     Row Name 01/07/20 1049          How much help from another person do you currently need...    Turning from your back to your side while in flat bed without using bedrails?  4  -MS     Moving from lying on back to sitting on the side of a flat bed without bedrails?  3  -MS     Moving to and from a bed to a chair (including a wheelchair)?  3  -MS     Standing up from a chair using your arms (e.g., wheelchair, bedside chair)?  3  -MS     Climbing 3-5 steps with a railing?  2  -MS     To walk in hospital room?  3  -MS     AM-PAC 6 Clicks Score (PT)  18  -MS     Row Name 01/07/20 1049          Functional Assessment    Outcome Measure Options  AM-PAC 6 Clicks Basic Mobility (PT)  -MS       User Key  (r) = Recorded By, (t) = Taken By, (c) = Cosigned By    Initials Name Provider Type    Enriqueta Dominguez, PT Physical Therapist          PT Recommendation and Plan  Planned Therapy Interventions (PT Eval): balance training, bed mobility training, gait training, home exercise program, patient/family education, postural re-education, ROM (range of motion), stair training, strengthening, transfer training  Outcome Summary/Treatment Plan (PT)  Anticipated Discharge Disposition (PT): home with home health, skilled nursing facility(pending patient progress made)  Plan of Care Reviewed With: patient, spouse, family     Time Calculation:   PT Charges     Row Name 01/07/20 1042             Time  Calculation    Start Time  1021  -MS      Stop Time  1041  -MS      Time Calculation (min)  20 min  -MS      PT Received On  01/07/20  -MS      PT - Next Appointment  01/08/20  -MS      PT Goal Re-Cert Due Date  01/14/20  -MS        User Key  (r) = Recorded By, (t) = Taken By, (c) = Cosigned By    Initials Name Provider Type    Enriqueta Dominguez, JEREMY Physical Therapist        Therapy Charges for Today     Code Description Service Date Service Provider Modifiers Qty    59078511968 HC PT EVAL MOD COMPLEXITY 2 1/7/2020 Enriqueta Faye, PT GP 1    26813626502 HC PT THER PROC EA 15 MIN 1/7/2020 Enriqueta Faye, PT GP 1    22170523409 HC PT THER SUPP EA 15 MIN 1/7/2020 Enriqueta Faye, PT GP 1          PT G-Codes  Outcome Measure Options: AM-PAC 6 Clicks Basic Mobility (PT)  AM-PAC 6 Clicks Score (PT): 18    Enriqueta Faye PT  1/7/2020

## 2020-01-07 NOTE — PLAN OF CARE
Problem: Patient Care Overview  Goal: Plan of Care Review  Flowsheets (Taken 1/7/2020 0939)  Plan of Care Reviewed With: patient;spouse;family  Note:   Patient is a pleasant 89 y.o. male POD1 R YEISON-posterior with expected post op weakness and impaired functional mobility. Patient is independent at baseline and lives at home with his spouse- access to RW. Today, patient performed bed mobility with CGA, required CGA for transfers, and ambulated 100' CGA with a RW. Patient will benefit from skilled PT services acutely to address functional deficits as well as improve level of independence prior to discharge. Anticipate home with HH PT vs. SNF upon DC.

## 2020-01-07 NOTE — PROGRESS NOTES
Orthopedic Total Hip Progress Note      Patient: Nicholas Lino  Date of Admission: 1/6/2020  YOB: 1928  Medical Record Number: [id number]    POD # :  1 Day Post-Op Procedure(s) (LRB):  TOTAL HIP ARTHROPLASTY (Right)    Systemic or Specific Complaints: No Complaints    Pain Relief: some relief    Physical Exam:  89 y.o.  male  Vitals:  Temp:  [96.6 °F (35.9 °C)-99.2 °F (37.3 °C)] 99.2 °F (37.3 °C)  Heart Rate:  [50-71] 62  Resp:  [16-20] 16  BP: ()/(45-90) 118/74  alert and oriented  Chest: Clear to auscultation  CV: Regular Rate and Rhythm  Abd: Soft, NT, with BS +  Ext: NV intact. ROM appropriate. Calf is soft and nontender. Negative Homans Sn  Skin: Incision clean dry and intact w/out signs or  symptoms of infection.    Activity: Mobilizing Per P.T.   Weight Bearing: As Tolerated    Data Review     Admission on 01/06/2020   Component Date Value Ref Range Status   • ABO Type 01/06/2020 O   Final   • RH type 01/06/2020 Positive   Final   • Antibody Screen 01/06/2020 Negative   Final   • T&S Expiration Date 01/06/2020 1/9/2020 11:59:59 PM   Final   • Hemoglobin 01/07/2020 12.1* 13.0 - 17.7 g/dL Final   • Hematocrit 01/07/2020 35.3* 37.5 - 51.0 % Final       No results found.    Medications:    aspirin 325 mg Oral Q12H   docusate sodium 100 mg Oral BID   hydroCHLOROthiazide 12.5 mg Oral Daily   lisinopril 40 mg Oral Daily   metoprolol tartrate 25 mg Oral Q12H   mupirocin  Each Nare BID   pantoprazole 40 mg Oral QAM   polyethylene glycol 17 g Oral BID     •  acetaminophen  •  HYDROcodone-acetaminophen  •  HYDROcodone-acetaminophen  •  ondansetron **OR** ondansetron    Assessment:  Doing well POD  # 1 Day Post-Op Procedure(s) (LRB):  TOTAL HIP ARTHROPLASTY (Right)  Problem List Items Addressed This Visit        Musculoskeletal and Integument    * (Principal) Primary osteoarthritis of right hip    Relevant Medications    meloxicam (MOBIC) tablet 15 mg (Completed)    pregabalin (LYRICA) capsule 150  mg (Completed)    vancomycin 1250 mg/250 mL 0.9% NS IVPB ([facility]) (Completed)          Plan:  NO hip precautions  Continue efforts to mobilize  Continue Pain Control Measures  Continue incisional Care  DVT prophylaxis    Discharge Plan:Home vs rehab  - nicole follow over next 24 hrs-  Concerns about advanced age and gait instability. May consider [place] home for rehab if not stable for home     Leroy Reeves MD    Date: 1/7/2020  Time: 7:09 AM

## 2020-01-07 NOTE — THERAPY TREATMENT NOTE
Acute Care - Physical Therapy Treatment Note  [facility]     Patient Name: Nicholas Lino  : 1928  MRN: [id number]  Today's Date: 2020             Admit Date: 2020    Visit Dx:    ICD-10-CM ICD-9-CM   1. Primary osteoarthritis of right hip M16.11 715.15     Patient Active Problem List   Diagnosis   • Coronary artery disease involving native coronary artery of native heart without angina pectoris   • AA (aortic aneurysm) (CMS/HCC)   • HLD (hyperlipidemia)   • History of coronary artery stent placement   • Hypertension   • Primary osteoarthritis of right hip   • OA (osteoarthritis) of hip       Therapy Treatment    Rehabilitation Treatment Summary     Row Name 20 1432             Treatment Time/Intention    Discipline  physical therapy assistant  -      Document Type  therapy note (daily note)  -      Subjective Information  no complaints  -      Mode of Treatment  group therapy;physical therapy  -      Existing Precautions/Restrictions  fall  -      Treatment Considerations/Comments  no hip prec  -      Recorded by [] Erin Diaz PTA 20 1449      Row Name 20 1432             Sit-Stand Transfer    Sit-Stand [place] (Transfers)  minimum assist (75% patient effort);contact guard;verbal cues  -      Assistive Device (Sit-Stand Transfers)  walker, front-wheeled  -      Recorded by [] Erin Diaz PTA 20 1458      Row Name 20 1432             Gait/Stairs Assessment/Training    [place] Level (Gait)  contact guard;verbal cues  -      Assistive Device (Gait)  walker, front-wheeled  -      Distance in Feet (Gait)  100  -      Deviations/Abnormal Patterns (Gait)  stride length decreased  -      Bilateral Gait Deviations  forward flexed posture  -      Comment (Gait/Stairs)  improved posture w/cues  -      Recorded by [] Erin Diaz, ANNA 20 1458      Row Name 20 1432             Therapeutic Exercise    Comment  (Therapeutic Exercise)  THR protocol x 15 reps  -      Recorded by [JM] Erin Diaz, ANNA 01/07/20 1458      Row Name 01/07/20 1432             Positioning and Restraints    Pre-Treatment Position  sitting in chair/recliner  -      Post Treatment Position  --  -      Bathroom  sitting;call light within reach;encouraged to call for assist;with family/caregiver wife aware pt has to wait until nsg comes to stand  -      Recorded by [JM] Erin Diaz PTA 01/07/20 1458      Row Name 01/07/20 1432             Pain Scale: Numbers Pre/Post-Treatment    Pain Scale: Numbers, Pretreatment  2/10  -      Pain Location - Side  Right  -      Pain Location  hip  -JM      Recorded by [SELENA] Erin Diaz PTA 01/07/20 1458      Row Name                Wound 01/06/20 Right posterior hip Incision    Wound - Properties Group Date first assessed: 01/06/20 [KR] Side: Right [HH] Orientation: posterior [HH] Location: hip [HH] Primary Wound Type: Incision [HH] Recorded by:  [HH] Rdaha Sevilla RN 01/06/20 1504 [KR] Katarina Anderson RN 01/06/20 1608      User Key  (r) = Recorded By, (t) = Taken By, (c) = Cosigned By    Initials Name Effective Dates Discipline    HH Radha Sevilla RN 06/16/16 -  Nurse    Erin Meng PTA 03/07/18 -  PT    KR Katarina Anderson RN 06/16/16 -  Nurse          Wound 01/06/20 Right posterior hip Incision (Active)   Dressing Appearance dry;intact;no drainage 1/7/2020 12:00 PM   Closure RONALD 1/7/2020 12:00 PM   Base dressing in place, unable to visualize 1/7/2020 12:00 PM   Drainage Amount none 1/6/2020  8:40 PM   Dressing Care, Wound other (see comments);elastic bandage 1/6/2020  5:00 PM       Rehab Goal Summary     Row Name 01/07/20 1049             Bed Mobility Goal 1 (PT)    Activity/Assistive Device (Bed Mobility Goal 1, PT)  bed mobility activities, all  -MS      [place] Level/Cues Needed (Bed Mobility Goal 1, PT)  supervision required  -MS      Time Frame (Bed  Mobility Goal 1, PT)  1 week  -MS         Transfer Goal 1 (PT)    Activity/Assistive Device (Transfer Goal 1, PT)  transfers, all;walker, rolling  -MS      [place] Level/Cues Needed (Transfer Goal 1, PT)  supervision required  -MS      Time Frame (Transfer Goal 1, PT)  1 week  -MS         Gait Training Goal 1 (PT)    Activity/Assistive Device (Gait Training Goal 1, PT)  gait (walking locomotion);walker, rolling  -MS      [place] Level (Gait Training Goal 1, PT)  supervision required  -MS      Distance (Gait Goal 1, PT)  150  -MS      Time Frame (Gait Training Goal 1, PT)  1 week  -MS         Stairs Goal 1 (PT)    Activity/Assistive Device (Stairs Goal 1, PT)  stairs, all skills  -MS      [place] Level/Cues Needed (Stairs Goal 1, PT)  contact guard assist  -MS      Number of Stairs (Stairs Goal 1, PT)  1  -MS      Time Frame (Stairs Goal 1, PT)  1 week  -MS        User Key  (r) = Recorded By, (t) = Taken By, (c) = Cosigned By    Initials Name Provider Type Discipline    Enriqueta Dominguez, PT Physical Therapist PT              PT Recommendation and Plan           Time Calculation:   PT Charges     Row Name 01/07/20 1458 01/07/20 1042          Time Calculation    Start Time  1400  -  1021  -MS     Stop Time  1431  -  1041  -MS     Time Calculation (min)  31 min  -  20 min  -MS     PT Received On  01/07/20  -  01/07/20  -MS     PT - Next Appointment  01/08/20  -  01/08/20  -MS     PT Goal Re-Cert Due Date  --  01/14/20  -MS        Time Calculation- PT    Total Timed Code Minutes- PT  20 minute(s)  -  --       User Key  (r) = Recorded By, (t) = Taken By, (c) = Cosigned By    Initials Name Provider Type    Erin Meng PTA Physical Therapy Assistant    Enriqueta Dominguez, PT Physical Therapist        Therapy Charges for Today     Code Description Service Date Service Provider Modifiers Qty    15963579170  PT THER PROC EA 15 MIN 1/7/2020 Erin Diaz PTA GP 1    76563804623  HC PT THER PROC GROUP 1/7/2020 Erin Diaz, PTA GP 1          PT G-Codes  Outcome Measure Options: AM-PAC 6 Clicks Basic Mobility (PT)  AM-PAC 6 Clicks Score (PT): 18    Erin Diaz, PTA  1/7/2020

## 2020-01-07 NOTE — PLAN OF CARE
Problem: Patient Care Overview  Goal: Plan of Care Review  Outcome: Ongoing (interventions implemented as appropriate)  Flowsheets  Taken 1/7/2020 0815 by Efrain Bhatt RN  Progress: improving  Taken 1/7/2020 1048 by Enriqueta Faye PT  Plan of Care Reviewed With: patient;spouse;family  Note:   VSS. Pt denies pain this shift. MULU dressing c/d/I. Voiding per BRP. Up to chair and worked with PT. Ambulates with assist and walker. Discussed bp med and monitoring r/t HTN. D/c plan pending.

## 2020-01-08 NOTE — PLAN OF CARE
Problem: Patient Care Overview  Goal: Plan of Care Review  Outcome: Ongoing (interventions implemented as appropriate)  Flowsheets (Taken 1/8/2020 5024)  Plan of Care Reviewed With: patient; spouse  Outcome Summary: pt lneka incr exer reps, low pain reported; still req assist to stand from chair and unsure wife can physically assist; dtrs and wife feel SNU would benefit pt to get stronger and build endurance before returning home; in agreement with that plan, dtrs not available all the time to assist pt at home

## 2020-01-08 NOTE — PROGRESS NOTES
Continued Stay Note  [facility]     Patient Name: Nicholas Lino  MRN: [id number]  Today's Date: 1/8/2020    Admit Date: 1/6/2020    Discharge Plan     Row Name 01/08/20 1359       Plan    Plan  Skilled bed at [facility] available tomorrow     Patient/Family in Agreement with Plan  yes    Plan Comments  [facility] met with patient and patient's spouse and daughter at bedside. Discussed [facility] having a bed available for tomorrow. Patient and family agreeable to SNF at discharge. Patient's family to transport to [facility] tomorrow. Anahi W[place]         Discharge Codes    No documentation.       Expected Discharge Date and Time     Expected Discharge Date Expected Discharge Time    Jan 9, 2020             PADMINI Charlton

## 2020-01-08 NOTE — PLAN OF CARE
Problem: Patient Care Overview  Goal: Plan of Care Review  Outcome: Ongoing (interventions implemented as appropriate)  Flowsheets  Taken 1/7/2020 0815  Progress: improving  Taken 1/8/2020 0812  Plan of Care Reviewed With: patient  Outcome Summary: 89/M POD 1 Right YEISON.  Ambulating w/walker and assist x1.  Voiding function intact.  Pain controlled with Tylenol.  D/C plan is rehab when able.

## 2020-01-08 NOTE — THERAPY TREATMENT NOTE
Acute Care - Physical Therapy Treatment Note  [facility]     Patient Name: Nicholas Lino  : 1928  MRN: [id number]  Today's Date: 2020             Admit Date: 2020    Visit Dx:    ICD-10-CM ICD-9-CM   1. Primary osteoarthritis of right hip M16.11 715.15     Patient Active Problem List   Diagnosis   • Coronary artery disease involving native coronary artery of native heart without angina pectoris   • AA (aortic aneurysm) (CMS/HCC)   • HLD (hyperlipidemia)   • History of coronary artery stent placement   • Hypertension   • Primary osteoarthritis of right hip   • OA (osteoarthritis) of hip       Therapy Treatment    Rehabilitation Treatment Summary     Row Name 20 1541 20          Treatment Time/Intention    Discipline  physical therapy assistant  -  physical therapy assistant  -     Document Type  therapy note (daily note)  -  therapy note (daily note)  -     Subjective Information  complains of;fatigue;pain  -SELENA  complains of;fatigue  -     Mode of Treatment  group therapy;physical therapy  -  group therapy;physical therapy  -     Care Plan Review  patient/other agree to care plan  -  patient/other agree to care plan  -     Care Plan Review, Other Participant(s)  spouse;daughter  -  spouse  -     Comment  NO HIP PREC  -  NO HIP PREC  -     Existing Precautions/Restrictions  fall  -  fall  -     Recorded by [] Erin Diaz, [facility] 20 1544 [] Erin Diaz, [facility] 20 1534     Row Name 20 0930             Bed Mobility Assessment/Treatment    Supine-Sit [place] (Bed Mobility)  minimum assist (75% patient effort)  -      Recorded by [] Erin Diaz, ANNA 20 1534      Row Name 20 1541 20 0930          Sit-Stand Transfer    Sit-Stand [place] (Transfers)  minimum assist (75% patient effort);contact guard;verbal cues cues for hand placement  -  minimum assist (75% patient effort);contact guard;verbal  cues cues for hand placement  -     Assistive Device (Sit-Stand Transfers)  walker, front-wheeled  -  walker, front-wheeled  -JM     Recorded by [] Erin Diaz, ANNA 01/08/20 1544 [] Erin Diaz, [facility] 01/08/20 1534     Row Name 01/08/20 1541 01/08/20 0930          Gait/Stairs Assessment/Training    [place] Level (Gait)  contact guard;verbal cues  -  contact guard;verbal cues  -     Assistive Device (Gait)  walker, front-wheeled  -JM  walker, front-wheeled  -JM     Distance in Feet (Gait)  100  -JM  100  -JM     Deviations/Abnormal Patterns (Gait)  stride length decreased  -  stride length decreased  -     Bilateral Gait Deviations  forward flexed posture some improvement of posture before cued  -JM  forward flexed posture some improvement of posture before cued  -JM     Comment (Gait/Stairs)  urgency for BR limiting incr amb  -JM  --     Recorded by [] Erin Diaz, ANNA 01/08/20 1544 [JM] Erin Diaz, [facility] 01/08/20 1534     Row Name 01/08/20 1541 01/08/20 0930          Therapeutic Exercise    Comment (Therapeutic Exercise)  THR protocol x 25 reps, incr pn w/HS   -JM  THR protocol x 20 reps  -     Recorded by [] Erin Diaz, [facility] 01/08/20 1544 [] Erin Diaz, [facility] 01/08/20 1534     Row Name 01/08/20 1541 01/08/20 0930          Positioning and Restraints    Pre-Treatment Position  sitting in chair/recliner  -  sitting in chair/recliner  -     Bathroom  sitting;call light within reach;encouraged to call for assist;with family/caregiver;notified nsg nsg aware and states pt calls out when ready  -  sitting;call light within reach;encouraged to call for assist;with family/caregiver  -     Recorded by [SELENA] Erin Diaz, ANNA 01/08/20 1544 [] Erin Diaz, ANNA 01/08/20 1534     Row Name 01/08/20 1541 01/08/20 0930          Pain Scale: Numbers Pre/Post-Treatment    Pain Scale: Numbers, Pretreatment  2/10  -JM  2/10  -JM     Pain Scale: Numbers,  Post-Treatment  3/10  -JM  --     Pain Location - Side  Right  -JM  Right  -JM     Pain Location  hip  -JM  hip  -JM     Recorded by [SELENA] Erin Diaz PTA 01/08/20 1544 [SELENA] Erin Diaz PTA 01/08/20 1534     Row Name                Wound 01/06/20 Right posterior hip Incision    Wound - Properties Group Date first assessed: 01/06/20 [KR] Side: Right [HH] Orientation: posterior [HH] Location: hip [HH] Primary Wound Type: Incision [HH] Recorded by:  [HH] Radha Sevilla RN 01/06/20 1504 [KR] Katarina Anderson RN 01/06/20 1608      User Key  (r) = Recorded By, (t) = Taken By, (c) = Cosigned By    Initials Name Effective Dates Discipline    HH Radha Sevilla RN 06/16/16 -  Nurse    Erin Meng PTA 03/07/18 -  PT    KR Katarina Anderson RN 06/16/16 -  Nurse          Wound 01/06/20 Right posterior hip Incision (Active)   Dressing Appearance dry;intact;no drainage 1/8/2020 12:19 PM   Closure RONALD 1/8/2020 12:19 PM   Base clean;dry 1/8/2020 12:19 PM   Drainage Amount none 1/8/2020 12:19 PM   Dressing Care, Wound dressing changed 1/8/2020  3:45 AM               PT Recommendation and Plan     Plan of Care Reviewed With: patient, spouse  Outcome Summary: pt lenka incr exer reps, low pain reported; still req assist to stand from chair and unsure wife can physically assist; dtrs and wife feel SNU would benefit pt to get stronger and build endurance before returning home  Outcome Measures     Row Name 01/08/20 1500             How much help from another person do you currently need...    Turning from your back to your side while in flat bed without using bedrails?  3  -JM      Moving from lying on back to sitting on the side of a flat bed without bedrails?  3  -JM      Moving to and from a bed to a chair (including a wheelchair)?  3  -JM      Standing up from a chair using your arms (e.g., wheelchair, bedside chair)?  3  -JM      Climbing 3-5 steps with a railing?  2  -JM      To walk in hospital room?  3   -SELENA      AM-PAC 6 Clicks Score (PT)  17  -        User Key  (r) = Recorded By, (t) = Taken By, (c) = Cosigned By    Initials Name Provider Type    Erin Meng PTA Physical Therapy Assistant         Time Calculation:   PT Charges     Row Name 01/08/20 1544 01/08/20 1538          Time Calculation    Start Time  1400  -  0930  -     Stop Time  1436  -  1006  -JM     Time Calculation (min)  36 min  -  36 min  -     PT Received On  01/08/20  -  01/08/20  -SELENA     PT - Next Appointment  01/09/20  -  01/08/20  -       User Key  (r) = Recorded By, (t) = Taken By, (c) = Cosigned By    Initials Name Provider Type    Erin Meng PTA Physical Therapy Assistant        Therapy Charges for Today     Code Description Service Date Service Provider Modifiers Qty    51169754590 HC PT THER PROC EA 15 MIN 1/7/2020 Erin Diaz, PTA GP 1    23367346330 HC PT THER PROC GROUP 1/7/2020 Erin Diaz, PTA GP 1    04565697571 HC PT THER PROC EA 15 MIN 1/8/2020 Erin Diaz, PTA GP 1    09680206673 HC PT THER PROC GROUP 1/8/2020 Cindy Diaznifer, PTA GP 1    80056965355 HC PT THER PROC EA 15 MIN 1/8/2020 Erin Diaz, PTA GP 1    27691232776 HC PT THER PROC GROUP 1/8/2020 Cindy Diaznifer, PTA GP 1          PT G-Codes  Outcome Measure Options: AM-PAC 6 Clicks Basic Mobility (PT)  AM-PAC 6 Clicks Score (PT): 17    Erin Diaz PTA  1/8/2020

## 2020-01-08 NOTE — PROGRESS NOTES
Orthopedic Progress Note      Patient: Nicholas Lino    YOB: 1928    Medical Record Number: [id number]    Attending Physician: Leroy Reeves MD    Date of Admission: 1/6/2020 12:00 PM    Admitting Dx:  Primary osteoarthritis of right hip [M16.11]  OA (osteoarthritis) of hip [M16.9]    Status Post: TOTAL HIP ARTHROPLASTY    Post Operative Day Number: 2    Current Problem List:   Patient Active Problem List   Diagnosis   • Coronary artery disease involving native coronary artery of native heart without angina pectoris   • AA (aortic aneurysm) (CMS/HCC)   • HLD (hyperlipidemia)   • History of coronary artery stent placement   • Hypertension   • Primary osteoarthritis of right hip   • OA (osteoarthritis) of hip         Past Medical History:   Diagnosis Date   • Aortic aneurysm (CMS/HCC)    • Arthritis    • CAD (coronary artery disease)    • Cancer (CMS/HCC)     skin cancer   • Hip pain    • Hyperlipidemia    • Hypertension        Current Medications:  Scheduled Meds:  aspirin 325 mg Oral Q12H   docusate sodium 100 mg Oral BID   hydroCHLOROthiazide 12.5 mg Oral Daily   lisinopril 40 mg Oral Daily   metoprolol tartrate 25 mg Oral Q12H   pantoprazole 40 mg Oral QAM   polyethylene glycol 17 g Oral BID     PRN Meds:.•  acetaminophen  •  HYDROcodone-acetaminophen  •  HYDROcodone-acetaminophen  •  ondansetron **OR** ondansetron    SUBJECTIVE: 89 y.o.  male. Awake and alert.  No complaints    OBJECTIVE:   Vitals:    01/07/20 1938 01/07/20 2321 01/08/20 0307 01/08/20 0629   BP: 108/65 114/64 116/64 104/60   BP Location: Left arm Left arm Left arm Left arm   Patient Position: Lying Lying Lying Lying   Pulse: 82 70 77 84   Resp: 16 16 16 16   Temp: 99.1 °F (37.3 °C) 100.4 °F (38 °C) 98.1 °F (36.7 °C) 97.6 °F (36.4 °C)   TempSrc: Oral Oral Oral Oral   SpO2: 94% 95% 94% 93%   Weight:       Height:         I/O last 3 completed shifts:  In: 600 [P.O.:600]  Out: 150 [Urine:150]    Diagnostic Tests:   Lab Results  (last 24 hours)     Procedure Component Value Units Date/Time    Hemoglobin & Hematocrit, Blood [[id number]]  (Abnormal) Collected:  01/08/20 0450    Specimen:  Blood Updated:  01/08/20 0521     Hemoglobin 11.9 g/dL      Hematocrit 33.8 %           PHYSICAL EXAM:  Right hip MULU dressing dry and intact.  Calf soft and nontender.  Good motion and sensation to right foot and ankle.  Pain well controlled.  Progressing well with therapy.  Hg 11.9.     ASSESSMENT & PLAN:  Continue to mobilize patient as he can tolerate.   Encouraged use of IS frequently throughout the day  Voiding well.   ABD soft with BS.  No BM  ASA for DVT prophylaxis.     Planning to go to [facility] tomorrow.          Date: 1/8/2020    ETTA Roberts MD

## 2020-01-09 NOTE — CONSULTS
"  [facility] Urinary Retention Consultation    Assessment   89 y.o. male with post-operative urinary retention due to post-operative state and benign prostatic hypertrophy     Plan   Add flomax and proscar  Office followup in one month    Subjective   Nicholas Lino is a 89 y.o. male who was admitted for Primary osteoarthritis of right hip [M16.11]  OA (osteoarthritis) of hip [M16.9]. Patient was referred by DR GALICIA for evaluation and treatment of urinary retention that began 2 days ago. Patient currently does not have a urinary catheter in place. Prior to this event, voiding symptoms consisted of slow stream, hesitancy, nocturia. There were no prior treatments. Recent medications that may have affected voiding include none. Patient has had recent constipation. Patient has had the following surgeries: hip.    Patient admits to history of underlying bladder outlet obstruction with baseline lower urinary tract symptoms. Patient denies history of previous retention. Prior workup included  cysto, with significant findings being cysto.    Outside records reviewed. Pertinent radiology and labs reviewed.    Review of Systems  A comprehensive review of systems was negative except for: Gastrointestinal: positive for constipation     Objective   Physicial Exam  /83 (BP Location: Left arm, Patient Position: Lying)   Pulse 105   Temp (!) 100.8 °F (38.2 °C) (Oral)   Resp 16   Ht 167.6 cm (66\")   Wt 79.4 kg (175 lb 0.7 oz)   SpO2 94%   BMI 28.25 kg/m²     General Appearance:    Alert, cooperative, no distress, appears stated age   Head:    Normocephalic, without obvious abnormality, atraumatic   Eyes:    PERRL, conjunctiva/corneas clear, EOM's intact, fundi     benign, both eyes        Ears:    Normal TM's and external ear canals, both ears   Nose:   Nares normal, septum midline, mucosa normal, no drainage    or sinus tenderness   Throat:   Lips, mucosa, and tongue normal; teeth and gums normal   Neck:   Supple, " symmetrical, trachea midline, no adenopathy;        thyroid:  No enlargement/tenderness/nodules; no carotid    bruit or JVD   Back:     Symmetric, no curvature, ROM normal, no CVA tenderness   Lungs:     Clear to auscultation bilaterally, respirations unlabored   Chest wall:    No tenderness or deformity   Heart:    Regular rate and rhythm, S1 and S2 normal, no murmur, rub   or gallop   Abdomen:     Soft, non-tender, bowel sounds active all four quadrants,     no masses, no organomegaly   Genitalia:    Normal male without lesion, discharge or tenderness   Rectal:    Normal tone, normal prostate, no masses or tenderness;    guaiac negative stool   Extremities:   Extremities normal, atraumatic, no cyanosis or edema   Pulses:   2+ and symmetric all extremities   Skin:   Skin color, texture, turgor normal, no rashes or lesions   Lymph nodes:   Cervical, supraclavicular, and axillary nodes normal   Neurologic:   CNII-XII intact. Normal strength, sensation and reflexes       throughout       Imaging  none    Labs  none

## 2020-01-09 NOTE — THERAPY TREATMENT NOTE
Acute Care - Physical Therapy Treatment Note  [facility]     Patient Name: Nicholas Lino  : 1928  MRN: [id number]  Today's Date: 2020             Admit Date: 2020    Visit Dx:    ICD-10-CM ICD-9-CM   1. Status post hip surgery Z98.890 V45.89   2. Primary osteoarthritis of right hip M16.11 715.15     Patient Active Problem List   Diagnosis   • Coronary artery disease involving native coronary artery of native heart without angina pectoris   • AA (aortic aneurysm) (CMS/HCC)   • HLD (hyperlipidemia)   • History of coronary artery stent placement   • Hypertension   • Primary osteoarthritis of right hip   • OA (osteoarthritis) of hip       Therapy Treatment    Rehabilitation Treatment Summary     Row Name 20             Treatment Time/Intention    Discipline  physical therapy assistant  -      Document Type  therapy note (daily note);discharge treatment  -      Subjective Information  no complaints  -      Mode of Treatment  group therapy;physical therapy  -      Existing Precautions/Restrictions  fall  -      Recorded by [] Erin Diaz PTA 20      Row Name 20             Sit-Stand Transfer    Sit-Stand [place] (Transfers)  contact guard;verbal cues cues for hand placement  -      Assistive Device (Sit-Stand Transfers)  walker, front-wheeled  -      Recorded by [] rEin Diaz PTA 20      Row Name 20             Gait/Stairs Assessment/Training    [place] Level (Gait)  contact guard;verbal cues  -      Assistive Device (Gait)  walker, front-wheeled  -      Distance in Feet (Gait)  110  -      Deviations/Abnormal Patterns (Gait)  stride length decreased  -      Bilateral Gait Deviations  forward flexed posture  -      Recorded by [] Erin Diaz PTA 20      Row Name 20             Therapeutic Exercise    Comment (Therapeutic Exercise)  THR protocol x 30 reps  -SELENA       Recorded by [SELENA] Erin Diaz PTA 01/09/20 1758      Row Name 01/09/20 0900             Positioning and Restraints    Pre-Treatment Position  sitting in chair/recliner  -      Bathroom  with family/caregiver;with nsg  -JM      Recorded by [SELENA] Erin Diaz PTA 01/09/20 1758      Row Name 01/09/20 0900             Pain Scale: Numbers Pre/Post-Treatment    Pain Scale: Numbers, Pretreatment  0/10 - no pain  -      Pain Scale: Numbers, Post-Treatment  2/10  -JM      Pain Location - Side  Right  -      Pain Location  hip  -JM      Recorded by [SELENA] Erin Diaz PTA 01/09/20 1758      Row Name                Wound 01/06/20 Right posterior hip Incision    Wound - Properties Group Date first assessed: 01/06/20 [KR] Side: Right [HH] Orientation: posterior [HH] Location: hip [HH] Primary Wound Type: Incision [HH] Recorded by:  [HH] Radha Sevilla RN 01/06/20 1500 [KR] Katarina Anderson RN 01/06/20 1608      User Key  (r) = Recorded By, (t) = Taken By, (c) = Cosigned By    Initials Name Effective Dates Discipline    HH Radha Sevilla RN 06/16/16 -  Nurse    JM Erin Diaz PTA 03/07/18 -  PT    KR Katarina Anderson RN 06/16/16 -  Nurse          Wound 01/06/20 Right posterior hip Incision (Active)   Dressing Appearance dry;intact;no drainage 1/9/2020  9:45 AM   Closure RONALD 1/9/2020  9:45 AM   Base dressing in place, unable to visualize 1/9/2020  9:45 AM               PT Recommendation and Plan     Plan of Care Reviewed With: patient, spouse  Outcome Summary: pt lenka incr exer reps, low pain reported; still req assist to stand from chair and unsure wife can physically assist; dtrs and wife feel SNU would benefit pt to get stronger and build endurance before returning home  Outcome Measures     Row Name 01/08/20 1500             How much help from another person do you currently need...    Turning from your back to your side while in flat bed without using bedrails?  3  -JM      Moving from lying  on back to sitting on the side of a flat bed without bedrails?  3  -JM      Moving to and from a bed to a chair (including a wheelchair)?  3  -JM      Standing up from a chair using your arms (e.g., wheelchair, bedside chair)?  3  -JM      Climbing 3-5 steps with a railing?  2  -JM      To walk in hospital room?  3  -JM      AM-PAC 6 Clicks Score (PT)  17  -        User Key  (r) = Recorded By, (t) = Taken By, (c) = Cosigned By    Initials Name Provider Type    Erin Meng PTA Physical Therapy Assistant         Time Calculation:   PT Charges     Row Name 01/09/20 1758             Time Calculation    Start Time  0900  -      Stop Time  0928  -      Time Calculation (min)  28 min  -         Time Calculation- PT    Total Timed Code Minutes- PT  15 minute(s)  -        User Key  (r) = Recorded By, (t) = Taken By, (c) = Cosigned By    Initials Name Provider Type    Erin Meng PTA Physical Therapy Assistant        Therapy Charges for Today     Code Description Service Date Service Provider Modifiers Qty    24844527131 HC PT THER PROC EA 15 MIN 1/8/2020 Erin Diaz, PTA GP 1    16563411459 HC PT THER PROC GROUP 1/8/2020 Erin Diaz, PTA GP 1    41309230034 HC PT THER PROC EA 15 MIN 1/8/2020 Erin Diaz, PTA GP 1    17557794270 HC PT THER PROC GROUP 1/8/2020 Erin Diaz, PTA GP 1    78894272282 HC PT THER PROC EA 15 MIN 1/9/2020 Erin Diaz, PTA GP 1    06233181447 HC PT THER PROC GROUP 1/9/2020 Erin Diaz, PTA GP 1          PT G-Codes  Outcome Measure Options: AM-PAC 6 Clicks Basic Mobility (PT)  AM-PAC 6 Clicks Score (PT): 17    Erin Diaz PTA  1/9/2020

## 2020-01-09 NOTE — PROGRESS NOTES
Case Management Discharge Note      Final Note: DC'd to skilled bed at [facility]. Constance Smith RN         Destination - Selection Complete      Service Provider Request Status Selected Services Address Phone Number Fax Number    [facility] Selected Skilled Nursing [address] [id number] [phone]      Durable Medical Equipment      No service has been selected for the patient.      Dialysis/Infusion      No service has been selected for the patient.      Home Medical Care      No service has been selected for the patient.      Therapy      No service has been selected for the patient.      Community Resources      No service has been selected for the patient.        Transportation Services  Private: Car    Final Discharge Disposition Code: 03 - skilled nursing facility (SNF)

## 2020-01-09 NOTE — PLAN OF CARE
Problem: Patient Care Overview  Goal: Plan of Care Review  Outcome: Ongoing (interventions implemented as appropriate)  Flowsheets  Taken 1/8/2020 1827 by Richmond Quezada, RN  Progress: improving  Outcome Summary: POD#2 of R YEISON. A&Ox4. C/O mild pain. Tolerating oral tylenol for pain. Up with assistance. Rl dressing to right hip with green blinking light. Good sensation and motion to ble. Patient was getting up to bathroom and voiding on his own, but stated to nurse later that he was having frequency and burning sensation when urinating. Dr. Reeves office called. Bladder scanned for 555mls post void residual. SC with 14fr cath. Tolerated it well. Some blood when inserting cath and some after.  UA sent. Urology consult ordered. Up with assistance. Up to chair. VSS. Educated on fall precautions and medications.  Taken 1/8/2020 1534 by Erin Diaz PTA  Plan of Care Reviewed With: patient;spouse

## 2020-01-09 NOTE — DISCHARGE SUMMARY
Patient Name: Nicholas Lino  Patient YOB: 1928    Date of Admission:  1/6/2020  Date of Discharge:  1/9/2020  Discharge Diagnosis: TOTAL HIP ARTHROPLASTY  Presenting Problem/History of Present Illness: Primary osteoarthritis of right hip [M16.11]  OA (osteoarthritis) of hip [M16.9]  Admitting Physician: Dr. Leroy Reeves  Consults:   Consults     Date and Time Order Name Status Description    1/8/2020 1627 Inpatient Urology Consult Completed           DETAILS OF HOSPITAL STAY:  Patient is a 89 y.o. male was admitted to the floor following the above procedure and underwent an uncomplicated hospital stay.  Patient did well with physical therapy and was ambulating well without problems.  On the day of discharge the wound was clean, dry and intact and calf was soft and non tender and Homans sign was negative.  Patient was tolerating  without problems.  Patient will be discharged to rehab.    Condition on Discharge:  Stable    Vital Signs  Temp:  [97.6 °F (36.4 °C)-100.9 °F (38.3 °C)] 99.8 °F (37.7 °C)  Heart Rate:  [] 115  Resp:  [16] 16  BP: ()/(55-83) 96/55    LABS:      Admission on 01/06/2020   Component Date Value Ref Range Status   • ABO Type 01/06/2020 O   Final   • RH type 01/06/2020 Positive   Final   • Antibody Screen 01/06/2020 Negative   Final   • T&S Expiration Date 01/06/2020 1/9/2020 11:59:59 PM   Final   • Hemoglobin 01/07/2020 12.1* 13.0 - 17.7 g/dL Final   • Hematocrit 01/07/2020 35.3* 37.5 - 51.0 % Final   • Hemoglobin 01/08/2020 11.9* 13.0 - 17.7 g/dL Final   • Hematocrit 01/08/2020 33.8* 37.5 - 51.0 % Final   • Color, UA 01/08/2020 Yellow  Yellow, Straw Final   • Appearance, UA 01/08/2020 Clear  Clear Final   • pH, UA 01/08/2020 5.5  5.0 - 8.0 Final   • Specific Gravity, UA 01/08/2020 1.010  1.005 - 1.030 Final   • Glucose, UA 01/08/2020 Negative  Negative Final   • Ketones, UA 01/08/2020 Negative  Negative Final   • Bilirubin, UA 01/08/2020 Negative  Negative Final   •  Blood, UA 01/08/2020 Negative  Negative Final   • Protein, UA 01/08/2020 Negative  Negative Final   • Leuk Esterase, UA 01/08/2020 Negative  Negative Final   • Nitrite, UA 01/08/2020 Negative  Negative Final   • Urobilinogen, UA 01/08/2020 0.2 E.U./dL  0.2 - 1.0 E.U./dL Final   • Hemoglobin 01/09/2020 11.7* 13.0 - 17.7 g/dL Final   • Hematocrit 01/09/2020 34.3* 37.5 - 51.0 % Final       No results found.    Discharge Medications     Discharge Medications      New Medications      Instructions Start Date   docusate sodium 100 MG capsule   100 mg, Oral, 2 Times Daily      finasteride 5 MG tablet  Commonly known as:  PROSCAR   5 mg, Oral, Daily      HYDROcodone-acetaminophen 7.5-325 MG per tablet  Commonly known as:  NORCO   2 tablets, Oral, Every 4 Hours PRN      ondansetron 4 MG tablet  Commonly known as:  ZOFRAN   4 mg, Oral, Every 6 Hours PRN      pantoprazole 40 MG EC tablet  Commonly known as:  PROTONIX   40 mg, Oral, Every Morning   Start Date:  January 10, 2020     polyethylene glycol pack packet  Commonly known as:  MIRALAX   17 g, Oral, 2 Times Daily         Continue These Medications      Instructions Start Date   hydroCHLOROthiazide 12.5 MG capsule  Commonly known as:  MICROZIDE   12.5 mg, Oral, Daily      lisinopril 40 MG tablet  Commonly known as:  PRINIVIL,ZESTRIL   40 mg, Oral, Daily      Loratadine 10 MG capsule   10 mg, Oral, Daily      metoprolol tartrate 25 MG tablet  Commonly known as:  LOPRESSOR   25 mg, Oral, 2 Times Daily      Potassium 99 MG tablet   1 tablet, Oral, Daily      PROBIOTIC DAILY PO   1 capsule, Oral, Daily      simvastatin 80 MG tablet  Commonly known as:  ZOCOR   80 mg, Oral, Nightly         Stop These Medications    acetaminophen 500 MG tablet  Commonly known as:  TYLENOL     ascorbic acid 1000 MG tablet  Commonly known as:  VITAMIN C     aspirin 81 MG chewable tablet     CALCIUM 600 + D PO     Chlorhexidine Gluconate Cloth 2 % pads     MULTIPLE VITAMINS PO     mupirocin 2 %  nasal ointment  Commonly known as:  BACTROBAN     vitamin B-12 1000 MCG tablet  Commonly known as:  CYANOCOBALAMIN            Activity at Discharge:     Discharge Instructions:   1) Continue with physical therapy exercises daily and continue working with the physical therapist as ordered.  2) Follow Posterior hip precautions.  3) Weight bear as tolerated.   4) Apply ice regularly. You may ice for long periods of time as long as ice is not directly on the skin   5) Patient instructed on frequent calf pumping exercises.  Patient also instructed on incentive spirometer during hospitalization and encouraged to continue to use at rehab regularly.   6)  The dressing should be left in place. If waterproof dressing is intact the patient may shower immediately following discharge. If the dressing becomes disloged or saturated it should be changed. Please refer to the [facility] information sheet if you have any questions about the dressing.  You may also call the [facility] dressing hotline for questions related to the dressing (1-314.196.2161). If there still other problems or questions related to the dressing despite these measures then you can contact Dolly at our office [phone].  7) Follow up appointment in 2 weeks - please call the office at [phone] to schedule.    8) Patient will be discharged on aspirin 325mg BID x 2weeks, then daily x 4weeks    Complete Discharge Diagnosis:    Patient Active Problem List   Diagnosis   • Coronary artery disease involving native coronary artery of native heart without angina pectoris   • AA (aortic aneurysm) (CMS/HCC)   • HLD (hyperlipidemia)   • History of coronary artery stent placement   • Hypertension   • Primary osteoarthritis of right hip   • OA (osteoarthritis) of hip       Follow-up Appointments  Future Appointments   Date Time Provider Department Center   1/23/2020 11:00 AM Leroy Reeves MD [facility] [address] None   7/31/2020  1:00 PM Kieran Stewart MD [id number] None               Pearl Kitchen, APRN  01/09/20  8:54 AM

## 2020-01-09 NOTE — PLAN OF CARE
Problem: Patient Care Overview  Goal: Plan of Care Review  Outcome: Ongoing (interventions implemented as appropriate)  Flowsheets  Taken 1/8/2020 1827 by Richmond Quezada RN  Progress: improving  Taken 1/9/2020 0410 by Radha Barakat RN  Outcome Summary: HTN well controlled. pain well controlled.     Problem: Fall Risk (Adult)  Goal: Absence of Fall  1/9/2020 0410 by Radha Barakat RN  Outcome: Ongoing (interventions implemented as appropriate)  Flowsheets (Taken 1/9/2020 0410)  Absence of Fall: making progress toward outcome  1/9/2020 0410 by Radha Barakat RN  Reactivated

## 2020-01-21 NOTE — THERAPY EVALUATION
Outpatient Physical Therapy Ortho Initial Evaluation  [facility]     Patient Name: Nicholas iLno  : 1928  MRN: [id number]  Today's Date: 2020      Visit Date: 2020    Patient Active Problem List   Diagnosis   • Coronary artery disease involving native coronary artery of native heart without angina pectoris   • AA (aortic aneurysm) (CMS/HCC)   • HLD (hyperlipidemia)   • History of coronary artery stent placement   • Hypertension   • Primary osteoarthritis of right hip   • OA (osteoarthritis) of hip        Past Medical History:   Diagnosis Date   • Aortic aneurysm (CMS/HCC)    • Arthritis    • CAD (coronary artery disease)    • Cancer (CMS/HCC)     skin cancer   • Hip pain    • Hyperlipidemia    • Hypertension         Past Surgical History:   Procedure Laterality Date   • APPENDECTOMY     • ARTERIAL ANEURYSM REPAIR     • CARDIAC CATHETERIZATION     • COLONOSCOPY     • CORONARY ANGIOPLASTY WITH STENT PLACEMENT     • COSMETIC SURGERY     • FEMORAL ARTERY REPAIR     • MOLE REMOVAL     • TOTAL HIP ARTHROPLASTY Right 2020    Procedure: TOTAL HIP ARTHROPLASTY;  Surgeon: Leroy Reeves MD;  Location: [facility];  Service: Orthopedics       Visit Dx:     ICD-10-CM ICD-9-CM   1. Status post right hip replacement Z96.641 V43.64   2. Pain of right hip joint M25.551 719.45   3. Decreased mobility R26.89 781.99         Patient History     Row Name 20 1200             History    Chief Complaint  Balance Problems decreased mobility  -JS      Date Current Problem(s) Began  20  -JS      Brief Description of Current Complaint  Patient with history of R hip pain x 6 months, treatment included physical therapy with patient diagnosed with R hip end-range OA. Referred to Dr. Reeves & s/p R YEISON on 2020.  Patient went to rehab at [facility] 10 days, returned to home on 2020.  Patient used cane with tripod base prior to surgery and continues to use cane at this time. Patient reports  pain is improved with surgery though continued hip pain with certain movements. Currently, patient reports greatest complaint is decreased mobility, noting that he moves slower with activities at this time. Goal is to resume golfing.   -JS      Previous treatment for THIS PROBLEM  Rehabilitation;Surgery  -JS      Surgery Date:  01/06/20  -JS      Patient/Caregiver Goals  Return to prior level of function;Improve mobility return to golfing  -JS      Occupation/sports/leisure activities  Weekly golfer, water color   -JS      Related/Recent Hospitalizations  Yes  -JS      Date of Hospitalization  01/06/20  -JS      Surgery/Hospitalization  1/6/2020  -JS         Pain     Pain Location  Hip  -JS      Pain at Present  0  -JS      Pain at Best  0  -JS      Pain at Worst  3  -JS      What Performance Factors Make the Current Problem(s) WORSE?  mild pain with walking, twisting  -JS      What Performance Factors Make the Current Problem(s) BETTER?  slow speed with movement  -JS      Is your sleep disturbed?  Yes somewhat disturbed  -JS      What position do you sleep in?  Supine  -JS      Difficulties with ADL's?  stairs, walking takes more time  -JS      Difficulties with recreational activities?  Golf, water color   -JS         Fall Risk Assessment    Any falls in the past year:  No  -JS         Services    Prior Rehab/Home Health Experiences  Yes  -JS      When was the prior experience with Rehab/Home Health  [facility] rehab  -JS      Are you currently receiving Home Health services  No  -JS         Daily Activities    Primary Language  English  -JS      How does patient learn best?  Listening;Reading  -JS      Pt Participated in POC and Goals  Yes  -JS         Safety    Are you being hurt, hit, or frightened by anyone at home or in your life?  No  -JS      Are you being neglected by a caregiver  No  -JS        User Key  (r) = Recorded By, (t) = Taken By, (c) = Cosigned By    Initials Name Provider Type     Yessenia Ahuja, PT Physical Therapist          PT Ortho     Row Name 01/21/20 1200       Subjective Comments    Subjective Comments  S/p R YEISON on 1/6/20, noting that hip feels much better following surgery.  -JS       Precautions and Contraindications    Precautions/Limitations  hip precautions- right  -JS       Subjective Pain    Able to rate subjective pain?  yes  -JS    Pre-Treatment Pain Level  1  -JS       Posture/Observations    Alignment Options  Forward head;Rounded shoulders  -JS    Forward Head  Moderate  -JS    Rounded Shoulders  Moderate  -JS    Posture/Observations Comments  Slight forward flexed posture  -JS       Quarter Clearing    Quarter Clearing  Lower Quarter Clearing  -JS       Myotomal Screen- Lower Quarter Clearing    Hip flexion (L2)  Right:;4 (Good);Left:;5 (Normal) R assessed below 90 degrees hip flexion  -JS    Knee extension (L3)  Bilateral:;5 (Normal)  -JS    Ankle DF (L4)  Bilateral:;4+ (Good +)  -JS    Ankle PF (S1)  Bilateral:;5 (Normal)  -JS    Knee flexion (S2)  Bilateral:;5 (Normal)  -JS       General ROM    RT Lower Ext  Rt Hip Flexion  -JS    LT Lower Ext  Lt Hip Flexion  -JS       Right Lower Ext    Rt Hip Flexion PROM  80 degrees with mild pain end-range  -JS       Left Lower Ext    Lt Hip Flexion PROM  WFL  -JS       MMT (Manual Muscle Testing)    Rt Lower Ext  Rt Hip Extension;Rt Hip ABduction  -JS    Lt Lower Ext  Lt Hip Extension;Lt Hip ABduction  -JS       MMT Right Lower Ext    Rt Hip Extension MMT, Gross Movement  (3-/5) fair minus assessed as supine bridge through partial ROM  -JS    Rt Hip ABduction MMT, Gross Movement  (3-/5) fair minus  -JS       MMT Left Lower Ext    Lt Hip Extension MMT, Gross Movement  (3-/5) fair minus assessed as supine bridge through partial ROM  -JS    Lt Hip ABduction MMT, Gross Movement  (4/5) good  -JS       Sensation    Sensation WNL?  WNL  -JS    Light Touch  No apparent deficits  -JS       Flexibility    Flexibility Tested?  Lower  Extremity  -JS       Lower Extremity Flexibility    Hamstrings  Bilateral:;Mildly limited  -JS       Transfers    Sit-Stand [place] (Transfers)  independent  -JS    Stand-Sit [place] (Transfers)  independent  -JS    Comment (Transfers)  Uses arm rests  -JS       Gait/Stairs Assessment/Training    [place] Level (Stairs)  independent mild R hip pain  -JS    Assistive Device (Stairs)  other (see comments) none  -JS    Handrail Location (Stairs)  both sides  -JS    Number of Steps (Stairs)  4  -JS    Ascending Technique (Stairs)  step-over-step  -JS    Descending Technique (Stairs)  step-over-step  -JS    Comment (Gait/Stairs)  amb with tripod based straight cane with slight forward flexed trunk, 1-2 x with slight LOB but recovers independently. Symmetrical step length., with slight dec weight shifting  -JS      User Key  (r) = Recorded By, (t) = Taken By, (c) = Cosigned By    Initials Name Provider Type    Yessenia Ahuja, PT Physical Therapist                      Therapy Education  Education Details: Role of outpatient PT, POC, differential diagnosis, initial HEP, expectations.     PT OP Goals     Row Name 01/21/20 1200          PT Short Term Goals    STG Date to Achieve  02/18/20  -JS     STG 1  Patient will be independent with education for symptom management, joint protection, strategies to minimize stress on affected tissues.  -JS     STG 1 Progress  New  -JS     STG 2  Patient will verbalize understanding & compliance with R hip precautions.  -JS     STG 2 Progress  New  -JS     STG 3  Patient will demonstrate improvement in functional strength & balance to allow improved 5x sit to stand from 16 sec to 11 sec or less.  -JS     STG 3 Progress  New  -JS     STG 4  Patient will improve 2 min walk test from 181 feet to 200 feet or greater demonstrating no LOB on level surface with use of cane as indicated.  -JS     STG 4 Progress  New  -JS        Long Term Goals    LTG Date to Achieve  03/03/20  -JS      LTG 1  Patient will be independent in comprehensive HEP to continue independently.  -JS     LTG 1 Progress  New  -JS     LTG 2  Patient will report reduced functional limitations to improve score on HOOS from 64.5% to 50% or better.  -JS     LTG 2 Progress  New  -JS     LTG 3  Patient will ambulate on unlevel surfaces with proper balance, near-normal gait with appropriate assistive device as indicated for improved safety in community & preparation for return to golf course.  -JS     LTG 3 Progress  New  -JS     LTG 4  Patient will improve functional strength to allow reciprocal stair-climbing x 14 stairs with rail to allow pt to access 2nd floor and basement art studio safely, painfree.  -JS     LTG 4 Progress  New  -JS     LTG 5  Patient will perform golf-simulated movements demonstrating no LOB, painfree to prepare for return to recreational golf.  -JS     LTG 5 Progress  New  -JS        Time Calculation    PT Goal Re-Cert Due Date  04/21/20  -JS       User Key  (r) = Recorded By, (t) = Taken By, (c) = Cosigned By    Initials Name Provider Type    Yessenia Ahuja, PT Physical Therapist          PT Assessment/Plan     Row Name 01/21/20 1200          PT Assessment    Functional Limitations  Decreased safety during functional activities;Impaired gait;Impaired locomotion;Limitation in home management;Limitations in community activities;Performance in sport activities;Performance in leisure activities;Performance in self-care ADL;Limitations in functional capacity and performance  -JS     Impairments  Balance;Endurance;Gait;Impaired flexibility;Muscle strength;Pain;Posture;Range of motion  -JS     Assessment Comments  Nicholas Lino is a 89 y.o. year-old male referred to physical therapy s/p R YEISON on 1/6/2020. He presents with a evolving clinical presentation.  He has PMH significant for chronic R hip pain x 6 months prior to surgery, arthritis, CA, CAD but no significant personal factors that may affect his progress  in the plan of care.  Signs and symptoms are consistent with referring diagnosis.  Patient presents to outpatient physical therapy following recent discharge from rehab on 1/19/2020. Patient will benefit from skilled physical therapy to address impairments, including gait, balance, ROM and strength to allow progress toward goals, including patient's personal goal of improving mobility and return to prior activities, including weekly golf.  -JS     Please refer to paper survey for additional self-reported information  Yes  -JS     Rehab Potential  Good  -JS     Patient/caregiver participated in establishment of treatment plan and goals  Yes  -JS     Patient would benefit from skilled therapy intervention  Yes  -JS        PT Plan    PT Frequency  2x/week  -JS     Predicted Duration of Therapy Intervention (Therapy Eval)  12-16 visits  -JS     Planned CPT's?  PT EVAL LOW COMPLEXITY: 44026;PT MANUAL THERAPY EA 15 MIN: 25770;PT HOT OR COLD PACK TREAT MCARE;PT THER PROC EA 15 MIN: 49068;PT NEUROMUSC RE-EDUCATION EA 15 MIN: 32352;PT GAIT TRAINING EA 15 MIN: 01925  -JS     Physical Therapy Interventions (Optional Details)  ROM (Range of Motion);stair training;neuromuscular re-education;home exercise program;balance training;stretching;patient/family education;manual therapy techniques;modalities;gait training  -JS     PT Plan Comments  Pt to be seen for post-operative rehab of R YEISON. Consider further gait training, sit to stand exercise, initial balance exercises with movements simulating golf within hip precautions as tolerated.  -JS       User Key  (r) = Recorded By, (t) = Taken By, (c) = Cosigned By    Initials Name Provider Type    Yessenia Ahuja, PT Physical Therapist            OP Exercises     Row Name 01/21/20 1200             Subjective Comments    Subjective Comments  S/p R YEISON on 1/6/20, noting that hip feels much better following surgery.  -JS         Subjective Pain    Able to rate subjective pain?  yes  -JS       Pre-Treatment Pain Level  1  -JS         Total Minutes    08024 - PT Therapeutic Exercise Minutes  10  -JS         Exercise 1    Exercise Name 1  Glut squeeze in hooklying  -JS      Cueing 1  Verbal;Demo  -JS      Reps 1  10  -JS         Exercise 2    Exercise Name 2  Bridges  -JS      Cueing 2  Verbal;Demo  -JS      Reps 2  10  -JS         Exercise 3    Exercise Name 3  Seated LAQ  -JS      Cueing 3  Verbal;Demo  -JS      Reps 3  10  -JS         Exercise 4    Exercise Name 4  Standing marching in place at counter  -JS      Cueing 4  Verbal;Demo  -JS      Reps 4  10  -JS         Exercise 5    Exercise Name 5  Standing hip abd at counter  -JS      Cueing 5  Verbal;Demo  -JS      Reps 5  10 B  -JS        User Key  (r) = Recorded By, (t) = Taken By, (c) = Cosigned By    Initials Name Provider Type    Yessenia Ahuja, PT Physical Therapist                        Outcome Measure Options: 2 Minute Walk Test, 5x Sit to Stand, Other Outcome Measure  2 Minute Walk Test  Gait, Assistive Device: straight cane(tripod base)  Distance Ambulated in 2 Minutes: 181  5 Times Sit to Stand  5 Times Sit to Stand (seconds): 16.33 seconds  5 Times Sit to Stand Comments: Use of B arm rests  Other Outcome Measure Tool Used  Other Outcome Measure Tool Comments: HOOS= 64.5%      Time Calculation:     Start Time: 1200  Stop Time: 1245  Time Calculation (min): 45 min     Therapy Charges for Today     Code Description Service Date Service Provider Modifiers Qty    26648136927 HC PT THER PROC EA 15 MIN 1/21/2020 Yessenia Vásquez, PT GP 1    92579603885 HC PT EVAL LOW COMPLEXITY 2 1/21/2020 Yessenia Vásquez, PT GP 1          PT G-Codes  Outcome Measure Options: 2 Minute Walk Test, 5x Sit to Stand, Other Outcome Measure         Yessenia Vásquez PT  1/21/2020

## 2020-01-23 NOTE — PROGRESS NOTES
Nicholas Lino : 1928 MRN: [id number] DATE: 2020    DIAGNOSIS: 2 week follow up right total hip     SUBJECTIVE:Patient returns today for 2 week follow up of right total hip replacement. Patient reports doing well with no unusual complaints. Appears to be progressing appropriately.    OBJECTIVE:   Exam:. The incision is healing appropriately. No sign of infection. Range of motion is progressing as expected. The calf is soft and nontender with a negative Homans sign.    DIAGNOSTIC STUDIES  Xrays: 2 views of the right hip (AP pelvis and lateral right hip) were ordered and reviewed for evaluation of recent hip replacement. They demonstrate a well positioned, well aligned hip replacement without complicating factors noted. In comparison with previous films there has been interval implant placement.    ASSESSMENT: 2 week status post right hip replacement.    PLAN: 1) Staples removed and steri strips applied   2) PT exercises   3) Discontinue LAWRENCE hose   4) Continue ice PRN   5) WBAT   6) aspirin 325 mg orally every day for 1 month   7) Follow up in 6 weeks with repeat Xrays of right hip (2views)    Leroy Reeves MD  2020

## 2020-01-24 NOTE — THERAPY TREATMENT NOTE
Outpatient Physical Therapy Ortho Treatment Note  [facility]     Patient Name: Nicholas Lino  : 1928  MRN: [id number]  Today's Date: 2020      Visit Date: 2020    Visit Dx:    ICD-10-CM ICD-9-CM   1. Status post right hip replacement Z96.641 V43.64   2. Pain of right hip joint M25.551 719.45   3. Decreased mobility R26.89 781.99       Patient Active Problem List   Diagnosis   • Coronary artery disease involving native coronary artery of native heart without angina pectoris   • AA (aortic aneurysm) (CMS/HCC)   • HLD (hyperlipidemia)   • History of coronary artery stent placement   • Hypertension   • Primary osteoarthritis of right hip   • OA (osteoarthritis) of hip        Past Medical History:   Diagnosis Date   • Aortic aneurysm (CMS/HCC)    • Arthritis    • CAD (coronary artery disease)    • Cancer (CMS/HCC)     skin cancer   • Hip pain    • Hyperlipidemia    • Hypertension         Past Surgical History:   Procedure Laterality Date   • APPENDECTOMY     • ARTERIAL ANEURYSM REPAIR     • CARDIAC CATHETERIZATION     • COLONOSCOPY     • CORONARY ANGIOPLASTY WITH STENT PLACEMENT     • COSMETIC SURGERY     • FEMORAL ARTERY REPAIR     • MOLE REMOVAL     • TOTAL HIP ARTHROPLASTY Right 2020    Procedure: TOTAL HIP ARTHROPLASTY;  Surgeon: Leroy Reeves MD;  Location: [facility];  Service: Orthopedics       PT Ortho     Row Name 20 5969       Subjective Comments    Subjective Comments  Pt reports MD appointment with Dr. Reeves went well yesterday. Denies pain today, notes some difficulty sleeping 2 nights ago (better last night)  -JS       Subjective Pain    Able to rate subjective pain?  yes  -JS    Pre-Treatment Pain Level  1  -JS      User Key  (r) = Recorded By, (t) = Taken By, (c) = Cosigned By    Initials Name Provider Type    Yessenia Ahuja, PT Physical Therapist                      PT Assessment/Plan     Row Name 20 1140          PT Assessment    Assessment Comments  Pt  presents to PT for 1st follow-up visit after initial evaluation, arriving 10 min late to appointment time. Pt with no significant hip pain per subjective report and no LOB during today's treatment with use of straight cane for ambulation. Tretament focused on hip/core strengthening, initial balance exercises.  Maintained hip precautions throughout session.  Pt does require increased processing time with intermittent need to repeat instructions though ultimately able to follow all directions.  Discussed with pt's daughter following treatment session who is aware & feels that changes have been present since surgery & may be related to medication- has follow-up visit scheduled with physician to discuss further.  -JS        PT Plan    PT Plan Comments  Continue further gait training, hip/core strengthening/stabilization & balance. Simulated golf movements for balance in subsequent visits as tolerated.  -JS       User Key  (r) = Recorded By, (t) = Taken By, (c) = Cosigned By    Initials Name Provider Type    Yessenia Ahuja, PT Physical Therapist            OP Exercises     Row Name 01/24/20 1140             Subjective Comments    Subjective Comments  Pt reports MD appointment with Dr. Reeves went well yesterday. Denies pain today, notes some difficulty sleeping 2 nights ago (better last night)  -JS         Subjective Pain    Able to rate subjective pain?  yes  -JS      Pre-Treatment Pain Level  1  -JS         Total Minutes    77477 - PT Therapeutic Exercise Minutes  40  -JS         Exercise 1    Exercise Name 1  Glut squeeze in hooklying  -JS      Cueing 1  Verbal;Tactile;Demo  -JS      Reps 1  10  -JS      Additional Comments  cues for positioning  -JS         Exercise 2    Exercise Name 2  Bridges  -JS      Cueing 2  Verbal;Demo  -JS      Reps 2  10  -JS         Exercise 3    Exercise Name 3  Seated LAQ  -JS      Cueing 3  Verbal;Demo  -JS      Reps 3  10  -JS      Additional Comments  2#  -JS         Exercise 4     Exercise Name 4  Standing marching in place at counter  -JS      Cueing 4  Verbal;Demo  -JS      Reps 4  10  -JS      Additional Comments  cues for upright posture  -JS         Exercise 5    Exercise Name 5  Standing hip abd at bar  -JS      Cueing 5  Verbal;Demo  -JS      Reps 5  10 B  -JS      Additional Comments  cues for upright posture  -JS         Exercise 6    Exercise Name 6  NuStep UE/LE- seat   -JS      Time 6  5 min  -JS      Additional Comments  L3  -JS         Exercise 7    Exercise Name 7  Hooklying hip abd  -JS      Cueing 7  Verbal;Demo  -JS      Sets 7  2  -JS      Reps 7  10  -JS      Additional Comments  RTB. Pillow placed between knees to prevent hip abd   -JS         Exercise 8    Exercise Name 8  Sidestepping at bar  -JS      Cueing 8  Verbal;Demo  -JS      Reps 8  3 laps  -JS         Exercise 9    Exercise Name 9  Step tap to airex with 1 UE support on bar  -JS      Cueing 9  Verbal;Demo  -JS      Reps 9  10x B, alternating  -JS        User Key  (r) = Recorded By, (t) = Taken By, (c) = Cosigned By    Initials Name Provider Type    Yessenia Ahuja PT Physical Therapist                                          Time Calculation:   Start Time: 1140  Stop Time: 1220  Time Calculation (min): 40 min  Therapy Charges for Today     Code Description Service Date Service Provider Modifiers Qty    [id number] HC PT THER PROC EA 15 MIN 1/24/2020 Yessenia Vásquez, JEREMY GP 3                    Yessenia Vásquez PT  1/24/2020

## 2020-01-26 PROBLEM — N17.9 AKI (ACUTE KIDNEY INJURY) (HCC): Status: ACTIVE | Noted: 2020-01-01

## 2020-01-26 PROBLEM — R31.9 HEMATURIA: Status: ACTIVE | Noted: 2020-01-01

## 2020-01-26 PROBLEM — R41.82 ALTERED MENTAL STATUS: Status: ACTIVE | Noted: 2020-01-01

## 2020-01-26 PROBLEM — I48.91 A-FIB (HCC): Status: ACTIVE | Noted: 2020-01-01

## 2020-01-27 PROBLEM — R31.21 ASYMPTOMATIC MICROSCOPIC HEMATURIA: Status: ACTIVE | Noted: 2020-01-01

## 2020-01-27 PROBLEM — F05 DELIRIUM DUE TO ANOTHER MEDICAL CONDITION: Status: ACTIVE | Noted: 2020-01-01

## 2020-01-30 PROBLEM — G92.9 ENCEPHALOPATHY, TOXIC: Status: ACTIVE | Noted: 2020-01-01

## 2020-01-31 PROBLEM — G92.9 ENCEPHALOPATHY, TOXIC: Status: RESOLVED | Noted: 2020-01-01 | Resolved: 2020-01-01

## 2020-01-31 PROBLEM — N17.9 AKI (ACUTE KIDNEY INJURY) (HCC): Status: RESOLVED | Noted: 2020-01-01 | Resolved: 2020-01-01

## 2020-02-01 PROBLEM — Z51.5 HOSPICE CARE: Status: ACTIVE | Noted: 2020-01-01

## 2020-02-01 PROBLEM — Z51.5 ENCOUNTER FOR HOSPICE CARE: Status: ACTIVE | Noted: 2020-01-01

## 2020-02-01 NOTE — PROGRESS NOTES
[facility] Visit Report    Nicholas Lino  [id number]  2/1/2020    Admission R/T [facility] Dx: Pending    Reason for [facility] Admission: Metabolic encephalopathy    Symptom  Management: Pain control, agitation/restlessness, congestion    Nursing/Medication Recommendations: Call [facility] with any questions or concerns at [phone]    Psychosocial Issues and Recommendations:    Spiritual Concerns and Recommendations:    [facility] Dis[facility]:  No orders for discharge at this time. Patient continues to require titration in medications for further symptom management of pain control, agitation/restlessness and congestion    Review of Visit: Reviewed v/s, medications and notes in Epic. I am unable to receive an update from facility RN. Upon arrival to room patient is lying in left recovery position. He appears comfortable and peaceful at this time. Patient's son in law is present at the bedside. He reports patient has been comfortable today. Patient is non responsive to voice or touch. Respirations are even and shallow on RA. He is noted with audible congestion and congestion noted upon auscultation. Abdomen is soft, non tender. Pedal pulses are weak bilaterally with trace pedal edema noted. F/C is present with yellow-douglas urine to bedside drain. His most recent v/s are: T 99.4, , RR 16, B/P 110/64, sats 91%. Since midnight patient hs received Ativan 1mg IV PRN x3 doses, Ativan 2mg IV PRN x3 doses, Morphine 4mg IV PRN x6 doses, Robinul 0.4mg IV PRN x4 doses, and has a scopalamine patch in place behind his right ear. Patient continues with steady decline. Will continue to visit daily, assess needs of patient/family and provide support        Alondra Denson RN  [facility] Visit Nurse

## 2020-02-01 NOTE — PLAN OF CARE
Pt has been repositioned q 4 hrs and has been premedicated with PRN Morphine and Ativan prior to turns. Pt tolerating repositioning well. Pt has appeared to rest peacefully in between turns. Pt has appeared more calm with routine meds. Son remains at bedside. F/C patent and draining. Pt really has only been responsive to pain. PRN Robinul given x1 for congestion. Will monitor

## 2020-02-01 NOTE — PLAN OF CARE
Problem: Patient Care Overview  Goal: Plan of Care Review  Outcome: Ongoing (interventions implemented as appropriate)  Flowsheets (Taken 1/31/2020 1855)  Progress: declining  Plan of Care Reviewed With: patient; spouse; daughter; son; family  Outcome Summary: Pt started shift extremely agitated and restless. Pt was given 5mg zyprexa IM and it did not phase him. He remained very upset and restless. MD ordered another dose of zyprexa 5mg. Given again with some results of a little calmer but pt still talking non stop and very fearful and restless. Pt met with Palliative RN and agreed to make pt palliative care. Once order set in pt given total of 1mg ativan and 2mg morphine for pain. Pt rested comfortablly enough for a FC to be placed and was comfortable remainder of shift. Pt family would like him premedicated with turns. Will continue comfort care and monitoring.

## 2020-02-01 NOTE — H&P
Name: Nicholas Lino ADMIT: 2020   : 1928  PCP: Lashon Ruffin MD    MRN: [id number] LOS: 1 days   AGE/SEX: 89 y.o. male  ROOM: [facility]/[address] [place] [address] care for terminal delirium    Subjective   Mr. Lino is a 89 y.o. male who presented to [facility] initially complaining of severe confusion, agitation and paranoid behavior. Please see the admitting history and physical for further details. He was found to have acute delirium and was admitted to the hospital for further evaluation and treatment.  Unfortunately there has been no reversible cause identified for his significant change in mental status.  He was seen by his cardiologist and by neurology and psychiatry.  Despite efforts he is continued to demonstrate intermittent agitation, restlessness and paranoia.  He was transferred to the palliative care floor and family has opted for end-of-life comfort care.  He has been seen by [facility] and will be admitted as a scattered bed status          History of Present Illness    Past Medical History:   Diagnosis Date   • Aortic aneurysm (CMS/HCC)    • Arthritis    • CAD (coronary artery disease)    • Cancer (CMS/HCC)     skin cancer   • Hip pain    • Hyperlipidemia    • Hypertension      Past Surgical History:   Procedure Laterality Date   • APPENDECTOMY     • ARTERIAL ANEURYSM REPAIR     • CARDIAC CATHETERIZATION     • COLONOSCOPY     • CORONARY ANGIOPLASTY WITH STENT PLACEMENT     • COSMETIC SURGERY     • FEMORAL ARTERY REPAIR     • MOLE REMOVAL     • TOTAL HIP ARTHROPLASTY Right 2020    Procedure: TOTAL HIP ARTHROPLASTY;  Surgeon: Leroy Reeves MD;  Location: [facility];  Service: Orthopedics     Family History   Problem Relation Age of Onset   • Heart disease Father    • Stroke Father    • No Known Problems Mother    • No Known Problems Maternal Grandmother    • No Known Problems Maternal Grandfather    • No Known Problems Paternal Grandmother    • No Known Problems Paternal  Grandfather    • Malig Hyperthermia Neg Hx      Social History     Tobacco Use   • Smoking status: Never Smoker   • Smokeless tobacco: Never Used   Substance Use Topics   • Alcohol use: No   • Drug use: No     Medications Prior to Admission   Medication Sig Dispense Refill Last Dose   • acetaminophen (TYLENOL) 500 MG tablet Take 500 mg by mouth Every 4 (Four) Hours As Needed for Mild Pain  or Fever.      • aspirin  MG tablet Take 325 mg by mouth 2 (Two) Times a Day.      • hydroCHLOROthiazide (MICROZIDE) 12.5 MG capsule Take 1 capsule by mouth Daily. 90 capsule 2 Taking   • Lactobacillus (FLORANEX PO) Take 1 capsule by mouth Daily.      • lisinopril (PRINIVIL,ZESTRIL) 40 MG tablet Take 40 mg by mouth daily.   Taking   • Loratadine 10 MG capsule Take 10 mg by mouth Daily.   Taking   • metoprolol tartrate (LOPRESSOR) 25 MG tablet Take 25 mg by mouth 2 (two) times a day.   Taking   • Potassium 99 MG tablet Take 1 tablet by mouth Daily.   Taking   • simvastatin (ZOCOR) 80 MG tablet Take 80 mg by mouth every night.   Taking     Allergies:    Allergies   Allergen Reactions   • Clindamycin/Lincomycin Rash   • Keflex [Cephalexin] Rash     Per patient interview in December 2019, very mild rash.       Review of Systems   Unable to obtain due to severity of condition.    Objective    Vital Signs  Temp:  [97 °F (36.1 °C)] 97 °F (36.1 °C)  Heart Rate:  [87-90] 90  Resp:  [18] 18  BP: ()/(49-67) 79/49    Physical Exam   Constitutional: He appears lethargic. No distress. He is sedated.   HENT:   Head: Normocephalic and atraumatic.   Eyes: Conjunctivae and EOM are normal. No scleral icterus.   Neck: No JVD present. No tracheal deviation present.   Cardiovascular: Normal rate and regular rhythm.   No murmur heard.  Pulmonary/Chest: Effort normal. No respiratory distress. He has decreased breath sounds. He has rhonchi.   Abdominal: Soft. Bowel sounds are normal. He exhibits no distension. There is no tenderness.    Musculoskeletal: He exhibits no edema.   Neurological: He appears lethargic. No cranial nerve deficit.   Skin: Skin is warm and dry.   Psychiatric:   Sedated, unable to assess   Vitals reviewed.      Results Review:   I reviewed the patient's new clinical results.    Assessment/Plan      Active Hospital Problems    Diagnosis  POA   • **Encounter for hospice care [Z51.5]  Not Applicable   • Delirium due to medical condition with behavioral disturbance [F05]  Yes   • Asymptomatic microscopic hematuria [R31.21]  Yes   • A-fib (CMS/HCC) [I48.91]  Yes   • Hypertension [I10]  Yes   • HLD (hyperlipidemia) [E78.5]  Yes   • History of coronary artery stent placement [Z95.5]  Not Applicable   • Coronary artery disease involving native coronary artery of native heart without angina pectoris [I25.10]  Yes      Resolved Hospital Problems   No resolved problems to display.         Mr. Lino is a 89 y.o. male who is being admitted to [facility] scattered bed status due to dementia and agitated delirium.    · Standard palliative care orders have been initiated.   · Will monitor his symptoms daily and make adjustments in their medications as necessary to maintain comfort.  · I have discontinued all meds that are not needed for comfort. No further lab draws.   · I have explained all of this to the family who are agreeable with this decision.      I discussed the patients findings and my recommendations with family.      John Lindsay MD  [facility]  02/01/20  4:05 AM

## 2020-02-02 NOTE — PLAN OF CARE
Patient rested comfortably today. Premedicated with MS and Ativan prior to turns, as well as Robinul and Scopolamine patch to manage secretions. Recovery position maintained. Suction set up. Discussed GOC with family at bedside, goal is comfort. Educated on s/s decline. WIll continue to assess and treat per MD orders and POC

## 2020-02-02 NOTE — PLAN OF CARE
Pt has remained unresponsive. Turned q 4 hrs from side to side in recovery position d/t increased congestion. Premedicated prior to turns with Ativan, Morphine and Robinul. Tolerating well. Has appeared to rest comfortably. F/C patent and draining. Family has remained at bedside. Will monitor

## 2020-02-02 NOTE — PROGRESS NOTES
Name: Nicholas Lino ADMIT: 2020   : 1928  PCP: Lashon Ruffin MD    MRN: [id number] LOS: 2 days   AGE/SEX: 89 y.o. male  ROOM: [facility]       Palliative Care Progress Note    This patient is currently on day 2 of management by Palliative Care Service.      Patient is a 89 y.o. male initially admitted to [facility] with confusion and agitation.    Review of Systems: Review of systems could not be obtained due to patient sedation status.    Mental/Cognitive Status: sedated    Respiratory Status: Shallow breathing, unlabored    Pain Assessment: Moans primarily with change in position    Pertinent Symptoms: Respiratory congestion    Diet/Nutrition: No oral intake    Patient's current condition/Anticipated Prognosis: Terminal.  Family/Healthcare Proxy involvement: Multiple family members at bedside daily.    Functional Assessments:  Palliative Performance Scale Score: 10%   ECOG Bedridden    [place] Scores  Pain Score: no pain ESAS Drowsiness Score: Worst possible drowsiness    ESAS Tiredness Score: Worst possible tiredness ESAS Lack of Appetite Score: Worst lack of appetite   ESAS Nausea Score: No nausea ESAS Wellbeing Score: 3   ESAS Depression Score: unable to assess ESAS Dyspnea Score: 3   ESAS Anxiety Score: No anxiety ESAS Other Problem Score: 7(congestion)     Palliative Performance Scale  Palliative Performance Scale Score: 10%  [place] Symptom Assessment System Revised  Pain Score: no pain   ESAS Tiredness Score: Worst possible tiredness  ESAS Nausea Score: No nausea  ESAS Depression Score: unable to assess  ESAS Anxiety Score: No anxiety  ESAS Drowsiness Score: Worst possible drowsiness  ESAS Lack of Appetite Score: Worst lack of appetite  ESAS Wellbeing Score: 3  ESAS Dyspnea Score: 3  ESAS Other Problem Score: 7(congestion)  ESAS Source of Information: healthcare professional caregiver  ESAS Intervention: medicated/see MAR  ESAS Intervention Response: tolerated    Vital Signs  Temp:  [98.4 °F (36.9  °C)-101.4 °F (38.6 °C)] 98.4 °F (36.9 °C)  Heart Rate:  [] 98  Resp:  [16-24] 20  BP: ()/(41-64) 103/56  Device (Oxygen Therapy): room air SpO2:  [71 %-91 %] 71 %    Physical Exam:  General Appearance:    Not awake and in no acute distress     Lungs:     Clear to auscultation, respirations regular, even and not          labored    Heart:    Regular rhythm and normal rate   Abdomen:     Occasional bowel sounds, no masses, no organomegaly,       soft and non-tender, non-distended   Extremities:   No edema, no cyanosis        Active Hospital Problems    Diagnosis  POA   • **Encounter for hospice care [Z51.5]  Not Applicable   • Hospice care [Z51.5]  Not Applicable   • Delirium due to medical condition with behavioral disturbance [F05]  Yes   • Asymptomatic microscopic hematuria [R31.21]  Yes   • A-fib (CMS/HCC) [I48.91]  Yes   • Hypertension [I10]  Yes   • HLD (hyperlipidemia) [E78.5]  Yes   • History of coronary artery stent placement [Z95.5]  Not Applicable   • Coronary artery disease involving native coronary artery of native heart without angina pectoris [I25.10]  Yes      Resolved Hospital Problems   No resolved problems to display.       Goals of Care:  Advanced Directives Info: Comfort measures only.  Discharge Planning: [facility] scattered bed status.    Palliative Care Team will continue to follow patient daily.    He has required 4 doses of Morphine for pain and 4 doses of Ativan for anxiety/agitation so far today. Continue end of life care.     Discussed with family at bedside.  Discussed with Dr. Mulligan who will take over care tomorrow.      John Lindsay MD  [facility]  02/02/20  4:45 PM

## 2020-02-02 NOTE — PROGRESS NOTES
[facility] Visit Report    Nicholas Lino  [id number]  2/2/2020    Admission R/T [facility] Dx: Pending    Reason for [facility] Admission: Metabolic encephalopathy    Symptom  Management: Pain control, anxiety and congestion    Nursing/Medication Recommendations: Call [facility] with any questions or concerns at [phone]    Psychosocial Issues and Recommendations:    Spiritual Concerns and Recommendations:    [facility] Dis[facility]:  No orders for discharge. Patient is actively dying and continues to require IV medications for symptom management of pain control, anxiety and congestion    Review of Visit: Reviewed v/s, medications and notes in Epic. Upon arrival to room there are multiple family members present including, Mrs Lino. She feels that Mr Lino is comfortable and peaceful. Family is appreciative of care that patient is receiving with [facility] and [facility]. Patient is non responsive to voice or touch. He does have mild audible congestion, however it is improved since yesterday. Respirations are shallow on RA. Abdomen is soft, no auscultated bowel sounds. F/C is noted with douglas urine. Most recent v/s are: T 101.4, , RR 24, B/P 78/41, sats 78%. Patient is being medicated prior to turns for symptom management of pain control,SOA, and congestion. Since midnight patient has received Ativan 2mg IV PRN x4 doses, Morphine 2mg IV PRN x4 doses, Robinul 0.4mg IV PRN x4 doses and a scopalamine patch is in place. Will continue to visit daily, assess needs of patient/family and provide support         Alondra Denson RN  [facility] Visit Nurse

## 2020-02-03 PROBLEM — G92.8 TOXIC METABOLIC ENCEPHALOPATHY: Status: ACTIVE | Noted: 2020-01-01

## 2020-02-03 NOTE — PROGRESS NOTES
[facility] Visit Report    Nicholas Lino  [id number]  2/3/2020    Admission R/T [facility] Dx: yes    Reason for [facility] Admission:metabolic encephalopathy    Symptom  Management: pain, congestion and restlessness    Nursing/Medication Recommendations:nothing at this time    Psychosocial Issues and Recommendations:    Spiritual Concerns and Recommendations:    [facility] Discharge Plans:  Nothing at this time, patient is actively dying    Review of Visit (Include All Collaboration- including names of hospital and family involved during admission/visit):RN received report from Allison(YESENIA RN). RN arrived at bedside. Family is present. Patient is laying in bed with his eyes closed and unresponsive. Patient's PPS is 10%. Patient is having terminal congestion. Urine output is minimal and tea colored. Patient's nail beds are grey and dusky. Patient is actively dying and comfortable. Family states understanding of patient's condition and comfort is goal for the patient. Family has no questions, needs or issues at this time.        Alan Kerns, RN

## 2020-02-03 NOTE — PROGRESS NOTES
[facility]  Visit Report    Nicholas Lino  [id number]  2/3/2020      Review of Visit (Include All Collaboration- including names of hospital and family involved during admission/visit):  SBT SW and CHP completed I&C visit, pt spouse Sydnee at bedside with pt, pt non-responsive, Sydnee feels he is comfortable and is very apprec of the care being provided; Sydnee has good support from their [employer], [facility], with regular clergy visits; in addition, large family is local, very supportive and involved;   No concerns or needs voiced, Sydnee aware of grief counseling availability.      Philipp Arreola, BCC

## 2020-02-03 NOTE — PLAN OF CARE
Problem: Patient Care Overview  Goal: Plan of Care Review  Outcome: Ongoing (interventions implemented as appropriate)  Flowsheets (Taken 2/3/2020 0638)  Progress: declining  Plan of Care Reviewed With: patient  Outcome Summary: premedicated prior to turns with 2 mg of morphine, 2 mg of ativan and 0.4 mg of robinul. recovery position, Navarrete catheter will continue to monitor and keep comfortable     Problem: Patient Care Overview  Goal: Individualization and Mutuality  Outcome: Ongoing (interventions implemented as appropriate)  Flowsheets (Taken 2/3/2020 0638)  Patient Specific Goals (Include Timeframe): to keep comfortable  Patient Specific Interventions: Premedicate, navarrete, recovery      PCP: Luis Mazariegos NP    Last appt: 12/13/2019 and refill   No future appointments. Requested Prescriptions     Pending Prescriptions Disp Refills    zolpidem (AMBIEN) 10 mg tablet 15 Tab 0     Sig: Take 1 Tab by mouth nightly as needed for Sleep. Max Daily Amount: 10 mg.

## 2020-02-03 NOTE — PROGRESS NOTES
"[facility] Visit Report    Nicholas Lino  [id number]  2/3/2020        Psychosocial Issues and Recommendations: No psychosocial issues noted. Per spouse, she has support from family, friends and Zoroastrian.     Spiritual Concerns and Recommendations:    [facility] Dis[facility] Plans:  See [facility] RN note    Review of Visit (Include All Collaboration- including names of hospital and family involved during admission/visit): Joint visit with [facility] . Spouse is at bedside. Spouse is aware and accepting of prognosis. She feels \"blessed\" because of the care at [facility] and the support she is receiving from her friends, family and Zoroastrian. [facility] SW role in a hospital setting reviewed. Information on [facility] Grief Counseling services provided.         Daniel Gamez LCSW   [facility] Clinical     "

## 2020-02-03 NOTE — PROGRESS NOTES
Case Management Discharge Note      Final Note: [facility] JU Villanueva Rn, CCP    Provided post acute provider list?: Yes  Post Acute Provider List: Nursing Home  Post Acute Provider Quality & Resource List: Yes  Delivered To: Support Person  Support Person: jersey Bender  Method of Delivery: In person    Destination - Selection Complete      Service Provider Request Status Selected Services Address Phone Number Fax Number    [facility] [address] [facility] [address] [phone]      Durable Medical Equipment      No service has been selected for the patient.      Dialysis/Infusion      No service has been selected for the patient.      Home Medical Care      No service has been selected for the patient.      Therapy      No service has been selected for the patient.      Community Resources      No service has been selected for the patient.             Final Discharge Disposition Code: 51 - hospice medical facility

## 2020-02-03 NOTE — PLAN OF CARE
Problem: Patient Care Overview  Goal: Plan of Care Review  Outcome: Ongoing (interventions implemented as appropriate)  Flowsheets  Taken 2/3/2020 0638 by Asha Mathews RN  Progress: declining  Taken 2/3/2020 0911 by Swapna Dixon RN  Plan of Care Reviewed With: patient;spouse;daughter   Patient medicated prior to turns. Patient minimally responsive to pain. Scop patch applied due to congestion. Family at bedside and very grateful of all the care. Will continue to monitor patient.

## 2020-02-03 NOTE — PROGRESS NOTES
Case Management Discharge Note      Final Note: [facility] JU Villanueva Rn, CCP    Provided post acute provider list?: Yes  Post Acute Provider List: Nursing Home  Post Acute Provider Quality & Resource List: Yes  Delivered To: Support Person  Support Person: jersey Bender  Method of Delivery: In person    Destination - Selection Complete      Service Provider Request Status Selected Services Address Phone Number Fax Number    [facility] [address] [address] [phone]      Durable Medical Equipment      No service has been selected for the patient.      Dialysis/Infusion      No service has been selected for the patient.      Home Medical Care      No service has been selected for the patient.      Therapy      No service has been selected for the patient.      Community Resources      No service has been selected for the patient.             Final Discharge Disposition Code: 51 - hospice medical facility

## 2020-02-04 NOTE — PLAN OF CARE
Problem: Patient Care Overview  Goal: Plan of Care Review  Outcome: Ongoing (interventions implemented as appropriate)  Flowsheets (Taken 2/4/2020 0430)  Progress: declining  Plan of Care Reviewed With: patient; son  Outcome Summary: premedicated prior to turns with 2 mg of morphine (increased last round to 4 mg of morphine due to moaning) 2mg of ativan and 0.4 mg of robinul. recovery position. will continue to monitor and keep to comfortable     Problem: Patient Care Overview  Goal: Individualization and Mutuality  Outcome: Ongoing (interventions implemented as appropriate)  Flowsheets (Taken 2/4/2020 0430)  Patient Specific Goals (Include Timeframe): to keep comfortable  Patient Specific Interventions: premedicate prior to turns, navarrete and oral care. recovery position

## 2020-02-04 NOTE — H&P
Palliative Care/Hospice Admit/Consult Note       Referring Provider: John Lindsay MD  Reason for Consultation: Hospice Care  Date of Admission:  1/31/2020    Patient Care Team:  Lashon Ruffin MD as PCP - Internal Medicine  Kel Mulligan MD as Consulting Physician (Hospice and Palliative Medicine)    Chief complaint:  TME/Delerium    History of present illness:  The patient is a 89 y.o. male who presented to [facility] 1/26/2020 initially for severe confusion, agitation and paranoid behavior.  The patient did undergo right total hip replacement 1/6/2020 due to right hip end-stage osteoarthritis.  The patient did go to a subacute rehab and began exhibiting symptoms of confusion there.  He was found to have acute delirium and was admitted to the hospital for further evaluation and treatment.  Unfortunately there has been no reversible cause identified for his significant change in mental status.  He was seen by his cardiologist due to new onset paroxysmal atrial fibrillation.  The patient also has a history of coronary artery disease.  Neurology described acute metabolic encephalopathy and delirium.  They thought he had some vascular dementia.  The patient did have some rigidity and myoclonus thought to be secondary to Haldol?  Additionally, there was a question of acute/subacute right cerebellar and pontomedullary stroke in a patient who has history of right vertebral artery stenosis.  And psychiatry also reported delirium of unknown etiology..  Despite efforts he has continued to demonstrate intermittent agitation, restlessness and paranoia. He was transferred to the palliative care floor and family has opted for end-of-life comfort care.  He was seen by \[facility]\"" and he was discharged from acute care and re-admitted as a scattered bed status.    At the time of my examination, the patient's wife is at bedside.  The patient was not awake and appeared comfortable.  He was lying slightly on his  left side.  No ROS obtainable.    Review of Systems  Pertinent items are noted in HPI    Palliative Performance Scale  Palliative Performance Scale Score: 10%  [place] Symptom Assessment System Revised  Pain Score: no pain   ESAS Tiredness Score: Worst possible tiredness  ESAS Nausea Score: No nausea  ESAS Depression Score: No depression  ESAS Anxiety Score: No anxiety  ESAS Drowsiness Score: Worst possible drowsiness  ESAS Lack of Appetite Score: Worst lack of appetite  ESAS Wellbeing Score: 1  ESAS Dyspnea Score: No shortness of breath  ESAS Other Problem Score: unable to assess  ESAS Source of Information: healthcare professional caregiver  ESAS Intervention: medicated/see MAR  ESAS Intervention Response: tolerated    History  Past Medical History:   Diagnosis Date   • Aortic aneurysm (CMS/HCC)    • Arthritis    • CAD (coronary artery disease)    • Cancer (CMS/HCC)     skin cancer   • Hip pain    • Hyperlipidemia    • Hypertension    ,   Past Surgical History:   Procedure Laterality Date   • APPENDECTOMY     • ARTERIAL ANEURYSM REPAIR     • CARDIAC CATHETERIZATION     • COLONOSCOPY     • CORONARY ANGIOPLASTY WITH STENT PLACEMENT     • COSMETIC SURGERY     • FEMORAL ARTERY REPAIR     • MOLE REMOVAL     • TOTAL HIP ARTHROPLASTY Right 1/6/2020    Procedure: TOTAL HIP ARTHROPLASTY;  Surgeon: Leroy Reeves MD;  [facility];  Service: Orthopedics   ,   Family History   Problem Relation Age of Onset   • Heart disease Father    • Stroke Father    • No Known Problems Mother    • No Known Problems Maternal Grandmother    • No Known Problems Maternal Grandfather    • No Known Problems Paternal Grandmother    • No Known Problems Paternal Grandfather    • Malig Hyperthermia Neg Hx     and   Social History     Tobacco Use   • Smoking status: Never Smoker   • Smokeless tobacco: Never Used   Substance Use Topics   • Alcohol use: No   • Drug use: No       Vital Signs   Temp:  [99.1 °F (37.3 °C)-100.9 °F (38.3 °C)]  99.1 °F (37.3 °C)  Heart Rate:  [] 99  Resp:  [24] 24  BP: (85-93)/(53-54) 93/54  Device (Oxygen Therapy): room air SpO2:  [86 %-88 %] 88 %    Physical Exam:  General Appearance:   Not awake and appears in no acute distress   Head:    Normocephalic, without obvious abnormality, atraumatic   Eyes:            Lids and lashes normal, conjunctivae and sclerae normal, no   icterus   Ears:    Ears appear intact with no abnormalities noted   Throat:   No oral lesions, oral mucosa moist   Neck:   No adenopathy, supple, trachea midline, no thyromegaly   Back:     No scoliosis present   Lungs:     Clear to auscultation, respirations regular and slight snoring noted, not labored    Heart:    Irregular rhythm and normal rate   Breast Exam:    Deferred   Abdomen:   Occasional bowel sounds, soft and non-tender, non-distended   Genitalia:    Deferred   Extremities:  No edema, no cyanosis    Pulses:  Radial pulses palpable and equal bilaterally   Skin:   No bleeding         Neurologic:  Not awake to test      Results Review:   I reviewed the patient's new clinical results.      Impression:      Delirium due to medical condition with behavioral disturbance    Toxic metabolic encephalopathy    Hospice care    Coronary artery disease involving native coronary artery of native heart without angina pectoris    A-fib (CMS/[facility])    History of coronary artery stent placement    Hypertension        Plan:  I reviewed all with the patient's wife at the bedside. I reviewed the patient's chart. The patient appears comfortable at present. The patient has received glycopyrrolate for airway congestion. The patient has required 4 doses 2 mg morphine and 4 doses 2 mg Ativan thus far today. Continue symptom management for comfort. I answered all questions.      Kel Mulligan MD  Hospice and Palliative Medicine  02/03/20  8:10 PM

## 2020-02-04 NOTE — PROGRESS NOTES
[facility] Visit Report    Nicholas Lino  [id number]  2/4/2020    Admission R/T [facility] Dx: Yes    Reason for [facility] Admission: Metabolic Encephalopathy     Symptom  Management: Pain, restlessness and congestion    Nursing/Medication Recommendations: Please contact [facility] with any questions or concerns and at time of death at [phone].     Psychosocial Issues and Recommendations:    Spiritual Concerns and Recommendations:    [facility] Discharge Plans:      Review of Visit (Include All Collaboration- including names of hospital and family involved during admission/visit): Daily RN visit. Reviewed MD/RN notes, VS and MAR in Epic. VS: 97.1, , RR 18, 97/80 and 81% on room air. Facility RN reports Pt is minimally responsive - moaning with turns and is being medicated prior to turns/care with Morphine 4mg IV prn x 2 (increased overnight from 2mg IV prn x 5), Ativan 2mg IV prn x 7, Robinul 0.4mg IV prn x 7 and Scopolamine patch in place. Pts PPs is at 10%. Oral care only. Upon entering room Pt with multiple family members at bedside including Pts wife. Pt lying in bed on his right side, in the recovery position and completely unresponsive to voice or touch. Pt appears to be resting peacefully with no s/s of pain, restlessness nor congestion. Pt appears slightly ashen in color, skin is warm to touch with cyanosis noted to fingernail beds.Trace edema noted to hands/arms. Respirations are even, shallow and unlabored. Mild congestion noted throughout. Abdomen soft and non-distended. Lawler catheter in place, secure with small dark urine. Reviewed Pt assessment with family with no questions or concerns voiced at this time. All seem aware and accepting of Pts declining status. [facility] continues to maintain Pt comfort by treating breakthrough symptoms of pain, restlessness and congestion while using prn IV medications. Pt seems to be in the active dying phase with no [facility] discharge. Will continue to monitor  daily with RN visits.    Corrie Devlin RN   Scatter Bed Team

## 2020-02-04 NOTE — PLAN OF CARE
Problem: Patient Care Overview  Goal: Plan of Care Review  Outcome: Ongoing (interventions implemented as appropriate)    Patient medicated prior to turns for comfort. Patient rested peacefully. Family at bedside today. Will cont. To monitor and follow current orders.

## 2020-02-05 NOTE — PROGRESS NOTES
Case Management Discharge Note      Final Note: The patient  on 2020 @ 15:12. B. ETTA Villanueva, CCP         Destination - Selection Complete      Service Provider Request Status Selected Services Address Phone Number Fax Number    [facility] [address] [id number] [phone]      Durable Medical Equipment      No service has been selected for the patient.      Dialysis/Infusion      No service has been selected for the patient.      Home Medical Care      No service has been selected for the patient.      Therapy      No service has been selected for the patient.      Community Resources      No service has been selected for the patient.             Final Discharge Disposition Code: 41 -  in medical facility

## 2020-02-05 NOTE — PLAN OF CARE
Problem: Patient Care Overview  Goal: Plan of Care Review  Outcome: Ongoing (interventions implemented as appropriate)  Flowsheets (Taken 2/5/2020 3263)  Progress: declining  Plan of Care Reviewed With: sibling  Outcome Summary: Appears comfortable at rest. Medicated prior to turns. Family at bedside. Will continue palliative care     Problem: Palliative Care (Adult)  Goal: Maximized Comfort  Outcome: Ongoing (interventions implemented as appropriate)  Flowsheets (Taken 2/5/2020 1416)  Maximized Comfort: making progress toward outcome

## 2020-02-05 NOTE — PROGRESS NOTES
Palliative Care/Hospice Follow Up Note       LOS: 4 days   Patient Care Team:  Lashon Ruffin MD as PCP - Internal Medicine  Kel Mulligan MD as Consulting Physician (Hospice and Palliative Medicine)    Chief Complaint:  TME/Delerium    Interval History:     Patient Complaints: None  Patient Denies: None  History taken from: Wife and RN; hospice RN note from earlier today reviewed    Review of Systems:  As above.    Palliative Performance Scale  Palliative Performance Scale Score: 10%  [place] Symptom Assessment System Revised  Pain Score: no pain   ESAS Tiredness Score: Worst possible tiredness  ESAS Nausea Score: No nausea  ESAS Depression Score: No depression  ESAS Anxiety Score: No anxiety  ESAS Drowsiness Score: Worst possible drowsiness  ESAS Lack of Appetite Score: Worst lack of appetite  ESAS Wellbeing Score: Best wellbeing  ESAS Dyspnea Score: 3  ESAS Other Problem Score: unable to assess  ESAS Source of Information: healthcare professional caregiver  ESAS Intervention: medicated/see MAR(turn)  ESAS Intervention Response: tolerated    Vital Signs  Temp:  [97.1 °F (36.2 °C)-102 °F (38.9 °C)] 102 °F (38.9 °C)  Heart Rate:  [110-112] 112  Resp:  [18-32] 32  BP: (93-97)/(58-80) 93/58  Device (Oxygen Therapy): room air SpO2:  [79 %-81 %] 79 %    Physical Exam:  General Appearance:    Not awake and in no acute distress lying slightly on his left side   Throat:   No oral lesions, oral mucosa moist   Neck:   No adenopathy, supple, trachea midline   Lungs:     Clear to auscultation with minimal rhonchi, respirations diminished and agonal    Heart:    Regular rhythm and tachycardia    Abdomen:     Occasional bowel sounds, soft and non-tender, non-distended   Extremities:   No edema, ashen/cyanosis   Pulses:   Radial pulses palpable and equal bilaterally          Results Review:     I reviewed the patient's new clinical results.    Medication Reviewed.    Assessment/Plan       Delirium due to medical  condition with behavioral disturbance    Toxic metabolic encephalopathy    Hospice care    Coronary artery disease involving native coronary artery of native heart without angina pectoris    A-fib (CMS/[facility])    History of coronary artery stent placement    Hypertension    I reviewed with the patient's wife and family at bedside.  The patient appears comfortable and the patient's wife had no questions.  The patient has required glycopyrrolate for airway congestion.  The patient has required 1 dose of 2 mg and 1 dose of 4 mg morphine (5 doses yesterday) and 2 doses of 2 mg Ativan (6 doses yesterday) thus far today.  Since yesterday, the patient has shown decline.  Continue medications and adjust as needed to maintain symptom management.    Plan for disposition:  HSB.    Kel Mulligan MD  Hospice and Palliative Medicine  02/04/20  7:45 PM

## 2020-02-05 NOTE — PROGRESS NOTES
[facility] Visit Report    Nicholas Lino  [id number]  2/5/2020    Admission R/T [facility] Dx: Yes    Reason for [facility] Admission: Metabolic Encephalopathy     Symptom  Management: Pain, restlessness and congestion    Nursing/Medication Recommendations: Please contact [facility] with any questions or concerns and at time of death at [phone]    Psychosocial Issues and Recommendations:    Spiritual Concerns and Recommendations:    [facility] Discharge Plans:  Incomplete    Review of Visit (Include All Collaboration- including names of hospital and family involved during admission/visit): Daily RN visit. Reviewed MD/RN notes, VS and MAR in Epic. VS: 100, , RR 20, 96/60 and 79% on room air. Facility RN reports Pt appears imminent, unresponsive and is being medicating prior to turns/care with Morphine 4mg IV prn x 4, Robinul 0.8mg IV prn x 2 (increased from 0.4mg IV prn x 3), Ativan 2mg IV prn x 8, Scopolamine patch in place and switched to Dilaudid 1mg IV prn x 2 for symptom management. Pts PPs remains at 10%. Oral care only. Pt with multiple family members at bedside. Pt lying in bed on his left side, in the recovery position. Pt is ashen in color, skin is warm to touch with cyanosis noted to fingernails. Pt is unresponsive to voice or touch with mild labored breathing and rhonchi noted to upper airway and throughout. Edema noted to left hand/arm. Lawler catheter in place with small dark urine. Reviewed Pt assessment with Mrs Lino and family - discussed normal progression of death and dying. Mrs Lino very appreciative with no questions or concerns. Spoke to Pts granddaughter Carline outside of room - continued discussing s/s of the dying process and addressed all questions. Pt does appear imminent with no [facility] discharge. [facility] continues to add/adjust prn IV medication/dosages to maintain optimal Pt comfort. Daily RN assessment required for symptom management of pain, restlessness and congestion using IV  prn medications. Will continue to monitor closely.          Corrie Devlin RN   Scatter Bed Team

## 2020-02-06 NOTE — PROGRESS NOTES
"Palliative Care/Hospice Follow Up Note       LOS: 5 days   Patient Care Team:  Lashon Ruffin MD as PCP - Internal Medicine  Kel Mulligan MD as Consulting Physician (Hospice and Palliative Medicine)    Chief Complaint:  TME/Delerium    Interval History:     Patient Complaints: None  Patient Denies: None  History taken from: Wife and family and RN; hospice RN note from earlier today reviewed    Review of Systems:  As above.    PPS at the time of my examination was 10%  Palliative Performance Scale  Palliative Performance Scale Score: 0%  [place] Symptom Assessment System Revised  Pain Score: no pain(premed for activity)   ESAS Tiredness Score: Worst possible tiredness  ESAS Nausea Score: No nausea  ESAS Depression Score: No depression  ESAS Anxiety Score: No anxiety  ESAS Drowsiness Score: Worst possible drowsiness  ESAS Lack of Appetite Score: Worst lack of appetite  ESAS Wellbeing Score: 9  ESAS Dyspnea Score: 3  ESAS Other Problem Score: 3(congestion)  ESAS Source of Information: healthcare professional caregiver  ESAS Intervention: medicated/see MAR  ESAS Intervention Response: tolerated    Vital Signs: At the time of my examination  bpm & RR 20  Temp:  [100 °F (37.8 °C)] 100 °F (37.8 °C)  Heart Rate:  [0-114] 0  Resp:  [0-20] 0  BP: (96)/(60) 96/60  Device (Oxygen Therapy): room air SpO2:  [79 %] 79 %    Physical Exam:  General Appearance:    Not awake and in no acute distress lying slightly on his left side   Throat:   No oral lesions, oral mucosa moist   Neck:   No adenopathy, supple, trachea midline   Lungs:     Clear to auscultation, respirations diminished and \"less\" agonal    Heart:    Regular rhythm and tachycardia    Abdomen:     Occasional bowel sounds, soft and non-tender, non-distended   Extremities:   No edema, ashen/cyanosis   Pulses:   Radial pulses palpable and equal bilaterally          Results Review:     I reviewed the patient's new clinical results.    Medication " Reviewed.    Assessment/Plan       Delirium due to medical condition with behavioral disturbance    Toxic metabolic encephalopathy    Hospice care    Coronary artery disease involving native coronary artery of native heart without angina pectoris    A-fib (CMS/[facility])    History of coronary artery stent placement    Hypertension    I reviewed with the patient's wife and family at bedside.  The patient appears comfortable and the patient's wife had no questions.  The patient has required glycopyrrolate for airway congestion.  The patient has required 3 doses of 4 mg morphine and 2 doses of 1 mg Dilaudid and 4 doses of 2 mg Ativan thus far today.  The patient has continued to shown decline.  Continue medications and adjust as needed to maintain symptom management.    Plan for disposition:  [facility].    Kel Mulligan MD  Hospice and Palliative Medicine  02/05/20  7:48 PM

## 2020-02-06 NOTE — DISCHARGE SUMMARY
Discharge As      Date of Admisssion:  2020  Date of Death:  2020  Time of Death:  3:12 PM    Patient Care Team:  Lashon Ruffin MD as PCP - Internal Medicine  Kel Mulligan MD as Consulting Physician (Hospice and Palliative Medicine)    Final Diagnosis:     Delirium due to medical condition with behavioral disturbance    Toxic metabolic encephalopathy    Hospice care    Coronary artery disease involving native coronary artery of native heart without angina pectoris    A-fib (CMS/[facility])    History of coronary artery stent placement    Hypertension      Hospital Course  Patient was a 89 y.o. male who presented to [facility] 2020 initially for severe confusion, agitation and paranoid behavior.  The patient did undergo right total hip replacement 2020 due to right hip end-stage osteoarthritis.  The patient did go to a subacute rehab and began exhibiting symptoms of confusion there.  He was found to have acute delirium and was admitted to the hospital for further evaluation and treatment.  Unfortunately there has been no reversible cause identified for his significant change in mental status.  He was seen by his cardiologist due to new onset paroxysmal atrial fibrillation.  The patient also has a history of coronary artery disease.  Neurology described acute metabolic encephalopathy and delirium.  They thought he had some vascular dementia.  The patient did have some rigidity and myoclonus thought to be secondary to Haldol?  Additionally, there was a question of acute/subacute right cerebellar and pontomedullary stroke in a patient who has history of right vertebral artery stenosis.  And psychiatry also reported delirium of unknown etiology..  Despite efforts he has continued to demonstrate intermittent agitation, restlessness and paranoia. He was transferred to the palliative care floor and family has opted for end-of-life comfort care.  He was seen by [facility] and he was  discharged from acute care and re-admitted as a scattered bed status. Symptom management provided and decline in his condition noted over the last 48 hours. Please see my note from earlier today. Subsequently, I was called that the patient's respirations ceased and no pulse palpable. No heart sounds audible. I pronounced the patient at 1512 hours.    Kel Mulligan MD  Hospice and Palliative Medicine  02/05/20  7:53 PM

## 2020-02-12 ENCOUNTER — APPOINTMENT (OUTPATIENT)
Dept: PHYSICAL THERAPY | Facility: HOSPITAL | Age: 85
End: 2020-02-12

## 2020-02-19 ENCOUNTER — APPOINTMENT (OUTPATIENT)
Dept: PHYSICAL THERAPY | Facility: HOSPITAL | Age: 85
End: 2020-02-19

## 2021-03-08 NOTE — THERAPY TREATMENT NOTE
Acute Care - Physical Therapy Treatment Note  [facility]     Patient Name: Nicholas Lino  : 1928  MRN: [id number]  Today's Date: 2020             Admit Date: 2020    Visit Dx:    ICD-10-CM ICD-9-CM   1. Primary osteoarthritis of right hip M16.11 715.15     Patient Active Problem List   Diagnosis   • Coronary artery disease involving native coronary artery of native heart without angina pectoris   • AA (aortic aneurysm) (CMS/HCC)   • HLD (hyperlipidemia)   • History of coronary artery stent placement   • Hypertension   • Primary osteoarthritis of right hip   • OA (osteoarthritis) of hip       Therapy Treatment    Rehabilitation Treatment Summary     Row Name 2030             Treatment Time/Intention    Discipline  physical therapy assistant  -      Document Type  therapy note (daily note)  -      Subjective Information  complains of;fatigue  -      Mode of Treatment  group therapy;physical therapy  -      Care Plan Review  patient/other agree to care plan  -      Care Plan Review, Other Participant(s)  spouse  -      Comment  NO HIP PREC  -      Existing Precautions/Restrictions  fall  -      Recorded by [] Erin Diaz PTA 20 1534      Row Name 2030             Bed Mobility Assessment/Treatment    Supine-Sit [place] (Bed Mobility)  minimum assist (75% patient effort)  -      Recorded by [] Erin Diaz PTA 20 1534      Row Name 2030             Sit-Stand Transfer    Sit-Stand [place] (Transfers)  minimum assist (75% patient effort);contact guard;verbal cues cues for hand placement  -      Assistive Device (Sit-Stand Transfers)  walker, front-wheeled  -      Recorded by [] Erin Diaz PTA 20 1534      Row Name 2030             Gait/Stairs Assessment/Training    [place] Level (Gait)  contact guard;verbal cues  -      Assistive Device (Gait)  walker, front-wheeled  -      Distance in  Feet (Gait)  100  -JM      Deviations/Abnormal Patterns (Gait)  stride length decreased  -      Bilateral Gait Deviations  forward flexed posture some improvement of posture before cued  -      Recorded by [JM] Erin Diaz, ANNA 01/08/20 1534      Row Name 01/08/20 0930             Therapeutic Exercise    Comment (Therapeutic Exercise)  THR protocol x 20 reps  -      Recorded by [JM] Erin Diaz PTA 01/08/20 1534      Row Name 01/08/20 0930             Positioning and Restraints    Pre-Treatment Position  sitting in chair/recliner  -      Bathroom  sitting;call light within reach;encouraged to call for assist;with family/caregiver  -      Recorded by [JM] Erin Diaz PTA 01/08/20 1534      Row Name 01/08/20 0930             Pain Scale: Numbers Pre/Post-Treatment    Pain Scale: Numbers, Pretreatment  2/10  -      Pain Location - Side  Right  -      Pain Location  hip  -      Recorded by [] Erin Diaz PTA 01/08/20 1534      Row Name                Wound 01/06/20 Right posterior hip Incision    Wound - Properties Group Date first assessed: 01/06/20 [KR] Side: Right [HH] Orientation: posterior [HH] Location: hip [HH] Primary Wound Type: Incision [HH] Recorded by:  [HH] Radha Sevilla RN 01/06/20 1502 [KR] Katarina Anderson RN 01/06/20 1600      User Key  (r) = Recorded By, (t) = Taken By, (c) = Cosigned By    Initials Name Effective Dates Discipline     Radha Sevilla RN 06/16/16 -  Nurse    Erin Meng PTA 03/07/18 -  PT    KR Katarina Anderson RN 06/16/16 -  Nurse          Wound 01/06/20 Right posterior hip Incision (Active)   Dressing Appearance dry;intact;no drainage 1/8/2020 12:19 PM   Closure RONALD 1/8/2020 12:19 PM   Base clean;dry 1/8/2020 12:19 PM   Drainage Amount none 1/8/2020 12:19 PM   Dressing Care, Wound dressing changed 1/8/2020  3:45 AM               PT Recommendation and Plan     Plan of Care Reviewed With: patient, spouse  Outcome Summary: pt  lenka incr exer reps, low pain reported; still req assist to stand from chair and unsure wife can physically assist; dtrs and wife feel SNU would benefit pt to get stronger and build endurance before returning home  Outcome Measures     Row Name 01/08/20 1500             How much help from another person do you currently need...    Turning from your back to your side while in flat bed without using bedrails?  3  -JM      Moving from lying on back to sitting on the side of a flat bed without bedrails?  3  -JM      Moving to and from a bed to a chair (including a wheelchair)?  3  -JM      Standing up from a chair using your arms (e.g., wheelchair, bedside chair)?  3  -JM      Climbing 3-5 steps with a railing?  2  -JM      To walk in hospital room?  3  -JM      AM-PAC 6 Clicks Score (PT)  17  -        User Key  (r) = Recorded By, (t) = Taken By, (c) = Cosigned By    Initials Name Provider Type    Erin Meng PTA Physical Therapy Assistant         Time Calculation:   PT Charges     Row Name 01/08/20 1538             Time Calculation    Start Time  0930  -      Stop Time  1006  -      Time Calculation (min)  36 min  -      PT Received On  01/08/20  -SELENA      PT - Next Appointment  01/08/20  -SELENA        User Key  (r) = Recorded By, (t) = Taken By, (c) = Cosigned By    Initials Name Provider Type    Erin Meng PTA Physical Therapy Assistant        Therapy Charges for Today     Code Description Service Date Service Provider Modifiers Qty    87123644138 HC PT THER PROC EA 15 MIN 1/7/2020 Erin Diaz PTA GP 1    06982050552 HC PT THER PROC GROUP 1/7/2020 Erin Diaz PTA GP 1    99642277280 HC PT THER PROC EA 15 MIN 1/8/2020 Erin Diaz PTA GP 1    92994903809 HC PT THER PROC GROUP 1/8/2020 Erin Diaz PTA GP 1          PT G-Codes  Outcome Measure Options: AM-PAC 6 Clicks Basic Mobility (PT)  AM-PAC 6 Clicks Score (PT): 17    Erin Diaz PTA  1/8/2020        independent/needs device

## (undated) DEVICE — PREP SOL POVIDONE/IODINE BT 4OZ

## (undated) DEVICE — GLV SURG SENSICARE MICRO PF LF 7 STRL

## (undated) DEVICE — GLV SURG SENSICARE W/ALOE PF LF 7.5 STRL

## (undated) DEVICE — HANDPIECE SET WITH COAXIAL HIGH FLOW TIP AND SUCTION TUBE: Brand: INTERPULSE

## (undated) DEVICE — SOL NACL 0.9PCT 100ML SGL

## (undated) DEVICE — SUT PDS 1 CT1 36IN Z347H

## (undated) DEVICE — SUT VIC 0 CT1 36IN J946H

## (undated) DEVICE — GLV SURG PREMIERPRO ORTHO LTX PF SZ7.5 BRN

## (undated) DEVICE — SPNG GZ WOVN 4X4IN 12PLY 10/BX STRL

## (undated) DEVICE — GLV SURG SENSICARE W/ALOE PF LF 8 STRL

## (undated) DEVICE — ANTIBACTERIAL UNDYED BRAIDED (POLYGLACTIN 910), SYNTHETIC ABSORBABLE SUTURE: Brand: COATED VICRYL

## (undated) DEVICE — PREMIUM WET SKIN PREP TRAY: Brand: MEDLINE INDUSTRIES, INC.

## (undated) DEVICE — DRSNG GZ PETROLTM XEROFORM CURAD 1X8IN STRL

## (undated) DEVICE — GLV SURG SENSICARE PI PF LF 7 GRN STRL

## (undated) DEVICE — MAT FLR ABSORBENT LG 4FT 10 2.5FT

## (undated) DEVICE — SUT VIC 1 CT1 36IN J947H

## (undated) DEVICE — 3M™ IOBAN™ 2 ANTIMICROBIAL INCISE DRAPE 6650EZ: Brand: IOBAN™ 2

## (undated) DEVICE — SYR LUERLOK 30CC

## (undated) DEVICE — PK HIP TOTL 40

## (undated) DEVICE — 3M™ IOBAN™ 2 ANTIMICROBIAL INCISE DRAPE 6640EZ: Brand: IOBAN™ 2

## (undated) DEVICE — APPL DURAPREP IODOPHOR APL 26ML